# Patient Record
Sex: FEMALE | Race: WHITE | NOT HISPANIC OR LATINO | Employment: FULL TIME | RURAL
[De-identification: names, ages, dates, MRNs, and addresses within clinical notes are randomized per-mention and may not be internally consistent; named-entity substitution may affect disease eponyms.]

---

## 2019-07-09 RX ORDER — MONTELUKAST SODIUM 10 MG/1
10 TABLET ORAL
Qty: 90 TABLET | Refills: 0 | Status: SHIPPED | OUTPATIENT
Start: 2019-07-09 | End: 2019-09-17 | Stop reason: SDUPTHER

## 2019-07-22 PROBLEM — J45.20 MILD INTERMITTENT ASTHMA WITHOUT COMPLICATION: Status: ACTIVE | Noted: 2019-07-22

## 2019-07-22 PROBLEM — A60.00 GENITAL HERPES SIMPLEX: Status: ACTIVE | Noted: 2019-07-22

## 2019-07-22 PROBLEM — I10 BENIGN HYPERTENSION: Status: ACTIVE | Noted: 2019-07-22

## 2019-07-22 PROBLEM — E80.4 GILBERT'S SYNDROME: Status: ACTIVE | Noted: 2019-07-22

## 2019-07-22 PROBLEM — I35.0 AORTIC VALVE STENOSIS: Status: ACTIVE | Noted: 2019-07-22

## 2019-07-22 PROBLEM — F43.0 ACUTE REACTION TO STRESS: Status: ACTIVE | Noted: 2019-07-22

## 2019-07-22 PROBLEM — N95.1 MENOPAUSAL SYNDROME: Status: ACTIVE | Noted: 2019-07-22

## 2019-07-22 PROBLEM — E03.9 HYPOTHYROIDISM: Status: ACTIVE | Noted: 2019-07-22

## 2019-07-22 PROBLEM — K22.719 BARRETT'S ESOPHAGUS WITH DYSPLASIA: Status: ACTIVE | Noted: 2019-07-22

## 2019-07-22 PROBLEM — J30.9 ALLERGIC RHINITIS: Status: ACTIVE | Noted: 2019-07-22

## 2019-07-22 PROBLEM — E78.2 MIXED HYPERLIPIDEMIA: Status: ACTIVE | Noted: 2019-07-22

## 2019-07-22 RX ORDER — MELATONIN 10 MG
1 TABLET, SUBLINGUAL SUBLINGUAL 3 TIMES WEEKLY
COMMUNITY

## 2019-07-22 RX ORDER — BUDESONIDE AND FORMOTEROL FUMARATE DIHYDRATE 160; 4.5 UG/1; UG/1
AEROSOL RESPIRATORY (INHALATION)
COMMUNITY
Start: 2014-05-20 | End: 2019-11-11

## 2019-07-22 RX ORDER — LEVOTHYROXINE SODIUM 0.1 MG/1
100 TABLET ORAL DAILY
COMMUNITY
End: 2019-09-19 | Stop reason: SDUPTHER

## 2019-07-23 ENCOUNTER — OFFICE VISIT (OUTPATIENT)
Dept: FAMILY MEDICINE CLINIC | Facility: CLINIC | Age: 59
End: 2019-07-23

## 2019-07-23 VITALS
HEIGHT: 65 IN | DIASTOLIC BLOOD PRESSURE: 72 MMHG | BODY MASS INDEX: 27.76 KG/M2 | OXYGEN SATURATION: 99 % | RESPIRATION RATE: 18 BRPM | SYSTOLIC BLOOD PRESSURE: 132 MMHG | WEIGHT: 166.6 LBS | TEMPERATURE: 98 F | HEART RATE: 59 BPM

## 2019-07-23 DIAGNOSIS — E80.4 GILBERT'S SYNDROME: ICD-10-CM

## 2019-07-23 DIAGNOSIS — J45.20 MILD INTERMITTENT ASTHMA WITHOUT COMPLICATION: ICD-10-CM

## 2019-07-23 DIAGNOSIS — N95.1 MENOPAUSAL SYNDROME: ICD-10-CM

## 2019-07-23 DIAGNOSIS — E03.9 HYPOTHYROIDISM, UNSPECIFIED TYPE: ICD-10-CM

## 2019-07-23 DIAGNOSIS — F43.0 ACUTE REACTION TO STRESS: ICD-10-CM

## 2019-07-23 DIAGNOSIS — I10 BENIGN HYPERTENSION: ICD-10-CM

## 2019-07-23 DIAGNOSIS — A60.00 GENITAL HERPES SIMPLEX, UNSPECIFIED SITE: ICD-10-CM

## 2019-07-23 DIAGNOSIS — J30.9 ALLERGIC RHINITIS, UNSPECIFIED SEASONALITY, UNSPECIFIED TRIGGER: ICD-10-CM

## 2019-07-23 DIAGNOSIS — I35.0 AORTIC VALVE STENOSIS, ETIOLOGY OF CARDIAC VALVE DISEASE UNSPECIFIED: Primary | ICD-10-CM

## 2019-07-23 DIAGNOSIS — K92.1 BLOODY STOOLS: ICD-10-CM

## 2019-07-23 DIAGNOSIS — E78.2 MIXED HYPERLIPIDEMIA: ICD-10-CM

## 2019-07-23 PROBLEM — K22.719 BARRETT'S ESOPHAGUS WITH DYSPLASIA: Status: RESOLVED | Noted: 2019-07-22 | Resolved: 2019-07-23

## 2019-07-23 PROCEDURE — 99213 OFFICE O/P EST LOW 20 MIN: CPT | Performed by: FAMILY MEDICINE

## 2019-07-23 RX ORDER — METOPROLOL SUCCINATE 25 MG/1
25 TABLET, EXTENDED RELEASE ORAL DAILY
COMMUNITY
End: 2019-08-14 | Stop reason: SDUPTHER

## 2019-07-23 NOTE — PROGRESS NOTES
Subjective   Irma Buchanan is a 58 y.o. female.     Unable to get in to [facility] until end of August.       Rectal Bleeding   The current episode started more than 1 month ago (worsened in the last two weeks). Associated symptoms include fatigue. Pertinent negatives include no abdominal pain, anorexia, chest pain, fever, joint swelling, nausea, neck pain, numbness, rash, swollen glands, vomiting or weakness. Associated symptoms comments: Dark and bright blood,  Thinks she has internal hemorrhoids, family hx of colon cancer.        The following portions of the patient's history were reviewed and updated as appropriate: allergies, current medications, past family history, past medical history, past social history, past surgical history and problem list.    Patient Active Problem List   Diagnosis   • Benign hypertension   • Allergic rhinitis   • Hypothyroidism   • Mild intermittent asthma without complication   • Menopausal syndrome   • Acute reaction to stress   • Aortic valve stenosis   • Mixed hyperlipidemia   • Gilbert's syndrome   • Genital herpes simplex       Current Outpatient Medications on File Prior to Visit   Medication Sig Dispense Refill   • budesonide-formoterol (SYMBICORT) 160-4.5 MCG/ACT inhaler SYMBICORT 160-4.5 MCG/ACT AERO     • Cholecalciferol (VITAMIN D3) 55167 units tablet Take 1 tablet by mouth 3 (Three) Times a Week.     • levothyroxine (SYNTHROID, LEVOTHROID) 100 MCG tablet Take 100 mcg by mouth Daily.     • metoprolol succinate XL (TOPROL-XL) 25 MG 24 hr tablet Take 25 mg by mouth Daily.     • montelukast (SINGULAIR) 10 MG tablet Take 1 tablet by mouth every night at bedtime. 90 tablet 0   • Nutritional Supplements (DHEA PO) Take  by mouth.       No current facility-administered medications on file prior to visit.        Allergies   Allergen Reactions   • Codeine Nausea Only   • Penicillin V Potassium Rash   • Sulfamethoxazole-Trimethoprim Rash   • Cefprozil Itching       Review of Systems    Constitutional: Positive for fatigue. Negative for activity change, appetite change and fever.   HENT: Negative for ear pain and voice change.    Eyes: Negative for visual disturbance.   Respiratory: Negative for shortness of breath and wheezing.    Cardiovascular: Negative for chest pain and leg swelling.   Gastrointestinal: Positive for blood in stool and hematochezia. Negative for abdominal pain, anorexia, constipation, diarrhea, nausea and vomiting.   Endocrine: Negative for polydipsia and polyuria.   Genitourinary: Negative for dysuria, frequency and hematuria.   Musculoskeletal: Negative for joint swelling, neck pain and neck stiffness.   Skin: Negative for rash and bruise.   Neurological: Negative for weakness, numbness and headache.   Psychiatric/Behavioral: Negative for suicidal ideas and depressed mood.       Objective   Physical Exam   Constitutional: She is oriented to person, place, and time. She appears well-developed and well-nourished.   HENT:   Head: Normocephalic and atraumatic.   Left Ear: External ear normal.   Nose: Nose normal.   Mouth/Throat: Oropharynx is clear and moist.   Eyes: EOM are normal. Pupils are equal, round, and reactive to light.   Neck: Normal range of motion. Neck supple.   Cardiovascular: Normal rate, regular rhythm and normal heart sounds.   Pulmonary/Chest: Effort normal.   Abdominal: Soft. Bowel sounds are normal.   Musculoskeletal: Normal range of motion.   Neurological: She is alert and oriented to person, place, and time.   Skin: Skin is warm and dry.   Psychiatric: She has a normal mood and affect. Her behavior is normal. Judgment and thought content normal.         Assessment/Plan .  Problem List Items Addressed This Visit     Benign hypertension    Relevant Medications    metoprolol succinate XL (TOPROL-XL) 25 MG 24 hr tablet    Allergic rhinitis    Hypothyroidism    Relevant Medications    levothyroxine (SYNTHROID, LEVOTHROID) 100 MCG tablet    metoprolol succinate  XL (TOPROL-XL) 25 MG 24 hr tablet    Mild intermittent asthma without complication    Relevant Medications    budesonide-formoterol (SYMBICORT) 160-4.5 MCG/ACT inhaler    Menopausal syndrome    Relevant Medications    Cholecalciferol (VITAMIN D3) 68284 units tablet    Nutritional Supplements (DHEA PO)    Acute reaction to stress    Aortic valve stenosis - Primary    Relevant Medications    metoprolol succinate XL (TOPROL-XL) 25 MG 24 hr tablet    Mixed hyperlipidemia    Gilbert's syndrome    Genital herpes simplex      Other Visit Diagnoses     Bloody stools        Question melena. Recc scope.     Relevant Orders    CBC Auto Differential    Ambulatory Referral to General Surgery (Completed)        CBC at [facility]. Appt tomorrow with Dr Baires.

## 2019-07-28 ENCOUNTER — RESULTS ENCOUNTER (OUTPATIENT)
Dept: FAMILY MEDICINE CLINIC | Facility: CLINIC | Age: 59
End: 2019-07-28

## 2019-07-28 DIAGNOSIS — K92.1 BLOODY STOOLS: ICD-10-CM

## 2019-08-14 DIAGNOSIS — I10 BENIGN HYPERTENSION: Primary | ICD-10-CM

## 2019-08-15 RX ORDER — METOPROLOL SUCCINATE 25 MG/1
TABLET, EXTENDED RELEASE ORAL
Qty: 90 TABLET | Refills: 0 | Status: SHIPPED | OUTPATIENT
Start: 2019-08-15 | End: 2019-09-17 | Stop reason: SDUPTHER

## 2019-09-17 DIAGNOSIS — E03.9 HYPOTHYROIDISM, UNSPECIFIED TYPE: ICD-10-CM

## 2019-09-17 DIAGNOSIS — I10 BENIGN HYPERTENSION: ICD-10-CM

## 2019-09-17 RX ORDER — MONTELUKAST SODIUM 10 MG/1
TABLET ORAL
Qty: 90 TABLET | Refills: 3 | Status: SHIPPED | OUTPATIENT
Start: 2019-09-17 | End: 2020-10-15

## 2019-09-17 RX ORDER — METOPROLOL SUCCINATE 25 MG/1
TABLET, EXTENDED RELEASE ORAL
Qty: 30 TABLET | Refills: 3 | Status: SHIPPED | OUTPATIENT
Start: 2019-09-17 | End: 2019-12-30 | Stop reason: SDUPTHER

## 2019-09-19 DIAGNOSIS — E03.9 HYPOTHYROIDISM, UNSPECIFIED TYPE: ICD-10-CM

## 2019-09-19 RX ORDER — LEVOTHYROXINE SODIUM 0.1 MG/1
TABLET ORAL
Qty: 90 TABLET | Refills: 1 | OUTPATIENT
Start: 2019-09-19

## 2019-09-19 RX ORDER — LEVOTHYROXINE SODIUM 0.1 MG/1
100 TABLET ORAL DAILY
Qty: 90 TABLET | Refills: 3 | Status: SHIPPED | OUTPATIENT
Start: 2019-09-19 | End: 2020-10-15

## 2019-11-11 ENCOUNTER — OFFICE VISIT (OUTPATIENT)
Dept: FAMILY MEDICINE CLINIC | Facility: CLINIC | Age: 59
End: 2019-11-11

## 2019-11-11 VITALS
DIASTOLIC BLOOD PRESSURE: 92 MMHG | BODY MASS INDEX: 29.22 KG/M2 | WEIGHT: 175.4 LBS | OXYGEN SATURATION: 98 % | TEMPERATURE: 98.1 F | HEIGHT: 65 IN | HEART RATE: 71 BPM | RESPIRATION RATE: 18 BRPM | SYSTOLIC BLOOD PRESSURE: 153 MMHG

## 2019-11-11 DIAGNOSIS — J01.00 ACUTE NON-RECURRENT MAXILLARY SINUSITIS: Primary | ICD-10-CM

## 2019-11-11 PROCEDURE — 99213 OFFICE O/P EST LOW 20 MIN: CPT | Performed by: FAMILY MEDICINE

## 2019-11-11 RX ORDER — SULFAMETHOXAZOLE AND TRIMETHOPRIM 800; 160 MG/1; MG/1
1 TABLET ORAL 2 TIMES DAILY
Qty: 20 TABLET | Refills: 0 | Status: SHIPPED | OUTPATIENT
Start: 2019-11-11 | End: 2019-11-21

## 2019-11-11 NOTE — ASSESSMENT & PLAN NOTE
He was prescribed Bactrim DS to treat his symptoms.    Increase fluids. Tylenol/motrin for pain or fever.   Medication and medication adverse effects discussed.    Follow-up 5-7 days for reevaluation if not improved or sooner if needed.

## 2019-11-11 NOTE — PROGRESS NOTES
Chief Complaint   Patient presents with   • URI       History of Present Illness:  Subjective   Irma Buchanan is a 58 y.o. female.   URI    This is a new problem. The current episode started 1 to 4 weeks ago (last wednesday ). The problem has been gradually worsening. The maximum temperature recorded prior to her arrival was 101 - 101.9 F. The fever has been present for less than 1 day. Associated symptoms include congestion, coughing, ear pain, headaches, a plugged ear sensation, sinus pain, sneezing and a sore throat. Pertinent negatives include no abdominal pain, chest pain, diarrhea, dysuria, joint pain, joint swelling, nausea, neck pain, rash, rhinorrhea, swollen glands, vomiting or wheezing. Associated symptoms comments: She states that this all started last week around Wednesday and she states that she feels as if it is not getting better. She states that she has had a fever but did not measure it. She states that her throat is bothering her and she states that she originally thought it was a sinus but it has gotten worse.  . She has tried nothing (She has had extra allergy and she has had some prednisone that her daughter had and she states that it helped some) for the symptoms. The treatment provided no relief.        Allergies:  Allergies   Allergen Reactions   • Codeine Nausea Only and Hallucinations   • Penicillin V Potassium Rash   • Sulfamethoxazole-Trimethoprim Rash   • Cefprozil Itching   • Sulfa Antibiotics Hallucinations   • Penicillins Rash       Social History:  Social History     Socioeconomic History   • Marital status:      Spouse name: Not on file   • Number of children: Not on file   • Years of education: Not on file   • Highest education level: Not on file   Tobacco Use   • Smoking status: Never Smoker   • Smokeless tobacco: Never Used   Substance and Sexual Activity   • Alcohol use: No     Frequency: Never   • Drug use: No       Family History:  Family History   Problem Relation  Age of Onset   • Heart disease Mother    • Stomach cancer Father        Past Medical History :  Active Ambulatory Problems     Diagnosis Date Noted   • Benign hypertension 07/22/2019   • Allergic rhinitis 07/22/2019   • Hypothyroidism 07/22/2019   • Mild intermittent asthma without complication 07/22/2019   • Menopausal syndrome 07/22/2019   • Acute reaction to stress 07/22/2019   • Aortic valve stenosis 07/22/2019   • Mixed hyperlipidemia 07/22/2019   • Gilbert's syndrome 07/22/2019   • Genital herpes simplex 07/22/2019   • Acute non-recurrent maxillary sinusitis 11/11/2019     Resolved Ambulatory Problems     Diagnosis Date Noted   • Ram's esophagus with dysplasia 07/22/2019     Past Medical History:   Diagnosis Date   • Allergic rhinitis    • Aortic stenosis    • Asthma    • Ram's esophagus    • Benign hypertension    • Gilbert's syndrome    • Hypothyroidism    • Menopausal syndrome    • Mixed hyperlipidemia        Medication List:    Current Outpatient Medications:   •  Cholecalciferol (VITAMIN D3) 12393 units tablet, Take 1 tablet by mouth 3 (Three) Times a Week., Disp: , Rfl:   •  Estradiol-Estriol-Progesterone (BIEST/PROGESTERONE TD), DISSOLVE ONE JONATHAN BETWEEN CHEEK AND TEETH TWICE DAILY. ALLOW TO DISSOLVE SLOWLY, Disp: , Rfl: 4  •  levothyroxine (SYNTHROID, LEVOTHROID) 100 MCG tablet, Take 1 tablet by mouth Daily., Disp: 90 tablet, Rfl: 3  •  metoprolol succinate XL (TOPROL-XL) 25 MG 24 hr tablet, TAKE ONE TABLET BY MOUTH ONCE DAILY, Disp: 30 tablet, Rfl: 3  •  montelukast (SINGULAIR) 10 MG tablet, TAKE ONE TABLET BY MOUTH AT BEDTIME, Disp: 90 tablet, Rfl: 3  •  Nutritional Supplements (DHEA PO), Take  by mouth., Disp: , Rfl:   •  Progesterone 40 % cream, 75mg- 0.25mg cream, Disp: , Rfl: 5  •  sulfamethoxazole-trimethoprim (BACTRIM DS) 800-160 MG per tablet, Take 1 tablet by mouth 2 (Two) Times a Day for 10 days., Disp: 20 tablet, Rfl: 0    Past Surgical History:  No past surgical history on file.  "    The following portions of the patient's history were reviewed and updated as appropriate: allergies, current medications, past family history, past medical history, past social history, past surgical history and problem list.    Review Of Systems:  Review of Systems   HENT: Positive for congestion, ear pain, sneezing and sore throat. Negative for rhinorrhea and swollen glands.    Respiratory: Positive for cough. Negative for wheezing.    Cardiovascular: Negative for chest pain.   Gastrointestinal: Negative for abdominal pain, diarrhea, nausea and vomiting.   Genitourinary: Negative for dysuria.   Musculoskeletal: Negative for joint pain and neck pain.   Skin: Negative for rash.       Physical Exam:  Vital Signs:  Vitals:    11/11/19 1610   BP: 153/92   Pulse: 71   Resp: 18   Temp: 98.1 °F (36.7 °C)   SpO2: 98%   Weight: 79.6 kg (175 lb 6.4 oz)   Height: 165.1 cm (65\")     Body mass index is 29.19 kg/m².    Physical Exam   Constitutional: She is oriented to person, place, and time. She appears well-developed and well-nourished. She is active.   HENT:   Head: Normocephalic and atraumatic.   Nose: Right sinus exhibits maxillary sinus tenderness. Left sinus exhibits maxillary sinus tenderness.   Mouth/Throat: Posterior oropharyngeal erythema present.   Eyes: Conjunctivae and lids are normal. Pupils are equal, round, and reactive to light.   Neck: Normal range of motion. Neck supple.   Cardiovascular: Normal rate, regular rhythm, normal heart sounds and normal pulses.   Pulmonary/Chest: Effort normal and breath sounds normal. No respiratory distress.   Abdominal: Soft. Bowel sounds are normal.   Musculoskeletal: Normal range of motion.   Neurological: She is alert and oriented to person, place, and time.   Skin: Skin is warm and dry. Capillary refill takes less than 2 seconds.   Psychiatric: She has a normal mood and affect. Her behavior is normal. Judgment and thought content normal.   Vitals " reviewed.        Assessment and Plan:  Diagnoses and all orders for this visit:    1. Acute non-recurrent maxillary sinusitis (Primary)  Assessment & Plan:  He was prescribed Bactrim DS to treat his symptoms.    Increase fluids. Tylenol/motrin for pain or fever.   Medication and medication adverse effects discussed.    Follow-up 5-7 days for reevaluation if not improved or sooner if needed.        Other orders  -     sulfamethoxazole-trimethoprim (BACTRIM DS) 800-160 MG per tablet; Take 1 tablet by mouth 2 (Two) Times a Day for 10 days.  Dispense: 20 tablet; Refill: 0

## 2019-11-14 ENCOUNTER — OFFICE VISIT (OUTPATIENT)
Dept: FAMILY MEDICINE CLINIC | Facility: CLINIC | Age: 59
End: 2019-11-14

## 2019-11-14 VITALS
WEIGHT: 170.2 LBS | OXYGEN SATURATION: 98 % | RESPIRATION RATE: 16 BRPM | BODY MASS INDEX: 28.36 KG/M2 | SYSTOLIC BLOOD PRESSURE: 136 MMHG | DIASTOLIC BLOOD PRESSURE: 81 MMHG | TEMPERATURE: 98 F | HEART RATE: 68 BPM | HEIGHT: 65 IN

## 2019-11-14 DIAGNOSIS — M27.8: ICD-10-CM

## 2019-11-14 DIAGNOSIS — J32.9 SINUSITIS, UNSPECIFIED CHRONICITY, UNSPECIFIED LOCATION: Primary | ICD-10-CM

## 2019-11-14 PROCEDURE — 99214 OFFICE O/P EST MOD 30 MIN: CPT | Performed by: PREVENTIVE MEDICINE

## 2019-11-14 RX ORDER — CLINDAMYCIN HYDROCHLORIDE 300 MG/1
CAPSULE ORAL
Qty: 20 CAPSULE | Refills: 0 | Status: SHIPPED | OUTPATIENT
Start: 2019-11-14 | End: 2020-02-11

## 2019-11-14 NOTE — PATIENT INSTRUCTIONS
tAKE BOTH ANTIOBIOTICS TILL GONE REST AND FLUIDS.  LOFF WORK TODAY AND TOMORROW.  eR IF FEVER OR INCREASE IN HEADACHE SEVERE

## 2019-11-14 NOTE — PROGRESS NOTES
"Luz Buchanan is a 58 y.o. female presents for   Chief Complaint   Patient presents with   • Sinusitis   ru ROOT CANAL THAT TOOK 5 HOURS CONCERN WITH SINUS PERFORATION. wAS ON cLEOCIN FOR ROOT CANAL  sTOPPED AND STARTED WITH URI THAT EVENING.   New fever over weekend and no sinus drainage.  Still tooth pain.  No cough or diarrhea    Health Maintenance Due   Topic Date Due   • ANNUAL PHYSICAL  11/19/1961   • TDAP/TD VACCINES (1 - Tdap) 11/19/1977   • ZOSTER VACCINE (1 of 2) 11/19/2008   • MAMMOGRAM  03/17/2012   • HEPATITIS C SCREENING  07/22/2019   • PAP SMEAR  07/22/2019   • LIPID PANEL  07/22/2019       History of Present Illness     Vitals:    11/14/19 0941 11/14/19 0947   BP: 135/90 136/81   BP Location: Left arm Right arm   Patient Position: Sitting Sitting   Cuff Size: Adult Adult   Pulse: 68    Resp: 16    Temp: 98 °F (36.7 °C)    TempSrc: Oral    SpO2: 98%    Weight: 77.2 kg (170 lb 3.2 oz)    Height: 165.1 cm (65\")      Body mass index is 28.32 kg/m².    Current Outpatient Medications on File Prior to Visit   Medication Sig Dispense Refill   • Cholecalciferol (VITAMIN D3) 43849 units tablet Take 1 tablet by mouth 3 (Three) Times a Week.     • Estradiol-Estriol-Progesterone (BIEST/PROGESTERONE TD) DISSOLVE ONE JONATHAN BETWEEN CHEEK AND TEETH TWICE DAILY. ALLOW TO DISSOLVE SLOWLY  4   • levothyroxine (SYNTHROID, LEVOTHROID) 100 MCG tablet Take 1 tablet by mouth Daily. 90 tablet 3   • metoprolol succinate XL (TOPROL-XL) 25 MG 24 hr tablet TAKE ONE TABLET BY MOUTH ONCE DAILY 30 tablet 3   • montelukast (SINGULAIR) 10 MG tablet TAKE ONE TABLET BY MOUTH AT BEDTIME 90 tablet 3   • Nutritional Supplements (DHEA PO) Take  by mouth.     • Progesterone 40 % cream 75mg- 0.25mg cream  5   • sulfamethoxazole-trimethoprim (BACTRIM DS) 800-160 MG per tablet Take 1 tablet by mouth 2 (Two) Times a Day for 10 days. 20 tablet 0     No current facility-administered medications on file prior to visit.        The " following portions of the patient's history were reviewed and updated as appropriate: allergies, current medications, past family history, past medical history, past social history, past surgical history and problem list.    Review of Systems   Constitutional: Negative.    HENT: Positive for congestion, dental problem, sore throat and swollen glands. Negative for sinus pressure.    Eyes: Negative.    Respiratory: Negative.  Negative for cough.    Cardiovascular: Negative.    Gastrointestinal: Negative.    Endocrine: Negative.    Genitourinary: Negative.    Musculoskeletal: Negative.    Skin: Negative.    Allergic/Immunologic: Positive for environmental allergies.   Neurological: Negative.    Hematological: Negative.    Psychiatric/Behavioral: Negative.        Objective   Physical Exam   Constitutional: She is oriented to person, place, and time. She appears well-developed.   HENT:   Head: Normocephalic and atraumatic.   Cervica;l adnopathy red nasal mucosa and R fascial pain.   Eyes: Conjunctivae and EOM are normal. Pupils are equal, round, and reactive to light.   Neck: Normal range of motion.   Cardiovascular: Normal rate and regular rhythm.   Pulmonary/Chest: Effort normal and breath sounds normal.   Abdominal: Soft. Bowel sounds are normal.   Musculoskeletal: Normal range of motion.   Lymphadenopathy:     She has no cervical adenopathy.   Neurological: She is alert and oriented to person, place, and time.   Skin: Skin is warm and dry.   Psychiatric: She has a normal mood and affect.   Nursing note and vitals reviewed.    PHQ-9 Total Score:      Assessment/Plan   Irma was seen today for sinusitis.    Diagnoses and all orders for this visit:    Sinusitis, unspecified chronicity, unspecified location    Root canal space perforation        Patient Instructions   tAKE BOTH ANTIOBIOTICS TILL GONE REST AND FLUIDS.  LOFF WORK TODAY AND TOMORROW.  eR IF FEVER OR INCREASE IN HEADACHE SEVERE

## 2019-12-29 DIAGNOSIS — I10 BENIGN HYPERTENSION: ICD-10-CM

## 2019-12-30 RX ORDER — METOPROLOL SUCCINATE 25 MG/1
TABLET, EXTENDED RELEASE ORAL
Qty: 30 TABLET | Refills: 3 | Status: SHIPPED | OUTPATIENT
Start: 2019-12-30 | End: 2020-03-16

## 2020-02-11 ENCOUNTER — HOSPITAL ENCOUNTER (OUTPATIENT)
Dept: GENERAL RADIOLOGY | Facility: HOSPITAL | Age: 60
Discharge: HOME | End: 2020-02-11
Admitting: FAMILY MEDICINE

## 2020-02-11 ENCOUNTER — TELEPHONE (OUTPATIENT)
Dept: FAMILY MEDICINE CLINIC | Facility: CLINIC | Age: 60
End: 2020-02-11

## 2020-02-11 ENCOUNTER — OFFICE VISIT (OUTPATIENT)
Dept: FAMILY MEDICINE CLINIC | Facility: CLINIC | Age: 60
End: 2020-02-11

## 2020-02-11 VITALS
HEIGHT: 65 IN | RESPIRATION RATE: 18 BRPM | BODY MASS INDEX: 30.82 KG/M2 | TEMPERATURE: 98 F | WEIGHT: 185 LBS | HEART RATE: 88 BPM | SYSTOLIC BLOOD PRESSURE: 132 MMHG | OXYGEN SATURATION: 99 % | DIASTOLIC BLOOD PRESSURE: 80 MMHG

## 2020-02-11 DIAGNOSIS — J32.0 CHRONIC MAXILLARY SINUSITIS: ICD-10-CM

## 2020-02-11 DIAGNOSIS — I35.0 AORTIC VALVE STENOSIS, ETIOLOGY OF CARDIAC VALVE DISEASE UNSPECIFIED: ICD-10-CM

## 2020-02-11 DIAGNOSIS — R06.09 DOE (DYSPNEA ON EXERTION): ICD-10-CM

## 2020-02-11 DIAGNOSIS — Z00.00 ANNUAL PHYSICAL EXAM: Primary | ICD-10-CM

## 2020-02-11 DIAGNOSIS — R53.83 FATIGUE, UNSPECIFIED TYPE: ICD-10-CM

## 2020-02-11 DIAGNOSIS — Z12.31 ENCOUNTER FOR SCREENING MAMMOGRAM FOR BREAST CANCER: ICD-10-CM

## 2020-02-11 PROCEDURE — 99213 OFFICE O/P EST LOW 20 MIN: CPT | Performed by: FAMILY MEDICINE

## 2020-02-11 PROCEDURE — 99396 PREV VISIT EST AGE 40-64: CPT | Performed by: FAMILY MEDICINE

## 2020-02-11 PROCEDURE — 71046 X-RAY EXAM CHEST 2 VIEWS: CPT

## 2020-02-11 NOTE — TELEPHONE ENCOUNTER
----- Message from Jesse Renteria MD sent at 2/11/2020  5:36 PM EST -----  Please notify patient that  cxr was normal

## 2020-02-11 NOTE — PROGRESS NOTES
Luz Buchanan is a 59 y.o. female.     The patient is here: for coordination of medical care and annual wellness examination.  Due for mammo. S/P hysterectomy. Colonoscopy 8/2019.  Recently treated for chronic sinusitis.      Shortness of Breath   Episode onset: 1 month. The problem occurs constantly. The problem has been gradually worsening. Pertinent negatives include no abdominal pain, chest pain, ear pain, fever, leg swelling, neck pain, rash, swollen glands, vomiting or wheezing. Associated symptoms comments: Tired all the time, swelling in ankles, fatigued, gained 16 lbs since July .        The following portions of the patient's history were reviewed and updated as appropriate: allergies, current medications, past family history, past medical history, past social history, past surgical history and problem list.    Patient Active Problem List   Diagnosis   • Benign hypertension   • Allergic rhinitis   • Hypothyroidism   • Mild intermittent asthma without complication   • Menopausal syndrome   • Acute reaction to stress   • Aortic valve stenosis   • Mixed hyperlipidemia   • Gilbert's syndrome   • Genital herpes simplex   • Acute non-recurrent maxillary sinusitis   • Astigmatism   • Chronic maxillary sinusitis       Current Outpatient Medications on File Prior to Visit   Medication Sig Dispense Refill   • Cholecalciferol (VITAMIN D3) 01807 units tablet Take 1 tablet by mouth 3 (Three) Times a Week.     • Estradiol-Estriol-Progesterone (BIEST/PROGESTERONE TD) DISSOLVE ONE JONATHAN BETWEEN CHEEK AND TEETH TWICE DAILY. ALLOW TO DISSOLVE SLOWLY  4   • levothyroxine (SYNTHROID, LEVOTHROID) 100 MCG tablet Take 1 tablet by mouth Daily. 90 tablet 3   • metoprolol succinate XL (TOPROL-XL) 25 MG 24 hr tablet TAKE ONE TABLET BY MOUTH ONCE DAILY 30 tablet 3   • montelukast (SINGULAIR) 10 MG tablet TAKE ONE TABLET BY MOUTH AT BEDTIME 90 tablet 3   • Progesterone 40 % cream 75mg- 0.25mg cream  5   • [DISCONTINUED]  clindamycin (CLEOCIN) 300 MG capsule oNE BY MOUTH TWICE DAILY 20 capsule 0   • [DISCONTINUED] Nutritional Supplements (DHEA PO) Take  by mouth.       No current facility-administered medications on file prior to visit.        Allergies   Allergen Reactions   • Codeine Nausea Only and Hallucinations   • Penicillin V Potassium Rash   • Sulfamethoxazole-Trimethoprim Rash   • Cefprozil Itching   • Sulfa Antibiotics Hallucinations   • Penicillins Rash       Review of Systems   Constitutional: Positive for fatigue. Negative for activity change, appetite change and fever.   HENT: Negative for ear pain, swollen glands and voice change.    Eyes: Negative for visual disturbance.   Respiratory: Positive for shortness of breath. Negative for wheezing.    Cardiovascular: Negative for chest pain and leg swelling.   Gastrointestinal: Negative for abdominal pain, blood in stool, constipation, diarrhea, nausea and vomiting.   Endocrine: Negative for polydipsia and polyuria.   Genitourinary: Negative for dysuria, frequency and hematuria.   Musculoskeletal: Negative for joint swelling, neck pain and neck stiffness.   Skin: Negative for rash and bruise.   Neurological: Negative for weakness, numbness and headache.   Psychiatric/Behavioral: Negative for suicidal ideas and depressed mood.     I have reviewed and confirmed the accuracy of the ROS as documented by the MA/LPN/RN Jesse Renteria MD      Objective   Vitals:    02/11/20 1550   BP: 132/80   Pulse: 88   Resp: 18   Temp: 98 °F (36.7 °C)   SpO2: 99%     Physical Exam   Constitutional: She is oriented to person, place, and time. She appears well-developed and well-nourished.   HENT:   Head: Normocephalic and atraumatic.   Left Ear: External ear normal.   Nose: Nose normal.   Mouth/Throat: Oropharynx is clear and moist.   Eyes: Pupils are equal, round, and reactive to light. EOM are normal.   Neck: Normal range of motion. Neck supple.   Cardiovascular: Normal rate, regular  rhythm and normal heart sounds.   Pulmonary/Chest: Effort normal.   Abdominal: Soft. Bowel sounds are normal.   Musculoskeletal: Normal range of motion.   Neurological: She is alert and oriented to person, place, and time.   Skin: Skin is warm and dry.   Psychiatric: She has a normal mood and affect. Her behavior is normal. Judgment and thought content normal.         Assessment/Plan .  Problem List Items Addressed This Visit     Aortic valve stenosis - Primary    Current Assessment & Plan     Recheck echo         Chronic maxillary sinusitis    Overview     Followed by ENT               Other Visit Diagnoses     SOLO (dyspnea on exertion)        Relevant Orders    Adult Transthoracic Echo Complete W/ Cont if Necessary Per Protocol    XR Chest PA & Lateral (Completed)    BNP    Fatigue, unspecified type        Relevant Orders    C-reactive protein    Comprehensive Metabolic Panel    CBC Auto Differential    Annual physical exam        Relevant Orders    Mammo Screening Digital Tomosynthesis Bilateral With CAD    Lipid Panel With / Chol / HDL Ratio    CBC Auto Differential    TSH    Encounter for screening mammogram for breast cancer        Relevant Orders    Mammo Screening Digital Tomosynthesis Bilateral With CAD      Discussed anticipatory guidance, diet, exercise, and weight loss.  Discussed safety and routine screening examinations.  Discussed self-examinations.  Findings discussed. All questions answered.  Differential diagnosis discussed.   Follow-up after testing complete, sooner for worsening symptoms or any concerns

## 2020-02-14 ENCOUNTER — TELEPHONE (OUTPATIENT)
Dept: FAMILY MEDICINE CLINIC | Facility: CLINIC | Age: 60
End: 2020-02-14

## 2020-02-14 LAB
ALBUMIN SERPL-MCNC: 4.6 G/DL (ref 3.8–4.8)
ALBUMIN/GLOB SERPL: 2 {RATIO} (ref 1.2–2.2)
ALP SERPL-CCNC: 69 IU/L (ref 39–117)
ALT SERPL-CCNC: 16 IU/L (ref 0–32)
AST SERPL-CCNC: 15 IU/L (ref 0–40)
BASOPHILS # BLD AUTO: 0.1 X10E3/UL (ref 0–0.2)
BASOPHILS NFR BLD AUTO: 1 %
BILIRUB SERPL-MCNC: 3.2 MG/DL (ref 0–1.2)
BNP SERPL-MCNC: 42.7 PG/ML (ref 0–100)
BUN SERPL-MCNC: 10 MG/DL (ref 8–27)
BUN/CREAT SERPL: 9 (ref 12–28)
CALCIUM SERPL-MCNC: 9.8 MG/DL (ref 8.7–10.3)
CHLORIDE SERPL-SCNC: 99 MMOL/L (ref 96–106)
CHOLEST SERPL-MCNC: 234 MG/DL (ref 100–199)
CHOLEST/HDLC SERPL: 3.3 RATIO (ref 0–4.4)
CO2 SERPL-SCNC: 27 MMOL/L (ref 20–29)
CREAT SERPL-MCNC: 1.07 MG/DL (ref 0.57–1)
CRP SERPL-MCNC: <1 MG/L (ref 0–10)
EOSINOPHIL # BLD AUTO: 0.2 X10E3/UL (ref 0–0.4)
EOSINOPHIL NFR BLD AUTO: 4 %
ERYTHROCYTE [DISTWIDTH] IN BLOOD BY AUTOMATED COUNT: 12.7 % (ref 11.7–15.4)
GLOBULIN SER CALC-MCNC: 2.3 G/DL (ref 1.5–4.5)
GLUCOSE SERPL-MCNC: 89 MG/DL (ref 65–99)
HCT VFR BLD AUTO: 43 % (ref 34–46.6)
HDLC SERPL-MCNC: 71 MG/DL
HGB BLD-MCNC: 14.4 G/DL (ref 11.1–15.9)
IMM GRANULOCYTES # BLD AUTO: 0 X10E3/UL (ref 0–0.1)
IMM GRANULOCYTES NFR BLD AUTO: 0 %
LDLC SERPL CALC-MCNC: 147 MG/DL (ref 0–99)
LYMPHOCYTES # BLD AUTO: 2.2 X10E3/UL (ref 0.7–3.1)
LYMPHOCYTES NFR BLD AUTO: 35 %
MCH RBC QN AUTO: 30.1 PG (ref 26.6–33)
MCHC RBC AUTO-ENTMCNC: 33.5 G/DL (ref 31.5–35.7)
MCV RBC AUTO: 90 FL (ref 79–97)
MONOCYTES # BLD AUTO: 0.5 X10E3/UL (ref 0.1–0.9)
MONOCYTES NFR BLD AUTO: 9 %
NEUTROPHILS # BLD AUTO: 3.2 X10E3/UL (ref 1.4–7)
NEUTROPHILS NFR BLD AUTO: 51 %
PLATELET # BLD AUTO: 247 X10E3/UL (ref 150–450)
POTASSIUM SERPL-SCNC: 4.1 MMOL/L (ref 3.5–5.2)
PROT SERPL-MCNC: 6.9 G/DL (ref 6–8.5)
RBC # BLD AUTO: 4.79 X10E6/UL (ref 3.77–5.28)
SODIUM SERPL-SCNC: 141 MMOL/L (ref 134–144)
TRIGL SERPL-MCNC: 82 MG/DL (ref 0–149)
TSH SERPL DL<=0.005 MIU/L-ACNC: 3.27 UIU/ML (ref 0.45–4.5)
VLDLC SERPL CALC-MCNC: 16 MG/DL (ref 5–40)
WBC # BLD AUTO: 6.2 X10E3/UL (ref 3.4–10.8)

## 2020-02-14 NOTE — TELEPHONE ENCOUNTER
----- Message from Jesse Renteria MD sent at 2/14/2020  8:46 AM EST -----  Please notify patient that  Chol is slightly elevated. Also kidney function high normal. Recheck routinely

## 2020-02-17 ENCOUNTER — HOSPITAL ENCOUNTER (OUTPATIENT)
Dept: MAMMOGRAPHY | Facility: HOSPITAL | Age: 60
Discharge: HOME | End: 2020-02-17
Admitting: FAMILY MEDICINE

## 2020-02-17 ENCOUNTER — HOSPITAL ENCOUNTER (OUTPATIENT)
Dept: CARDIOLOGY | Facility: HOSPITAL | Age: 60
Discharge: HOME | End: 2020-02-17

## 2020-02-17 VITALS — BODY MASS INDEX: 30.82 KG/M2 | HEIGHT: 65 IN | WEIGHT: 184.97 LBS

## 2020-02-17 PROCEDURE — 77067 SCR MAMMO BI INCL CAD: CPT

## 2020-02-17 PROCEDURE — 77063 BREAST TOMOSYNTHESIS BI: CPT

## 2020-02-17 PROCEDURE — 93306 TTE W/DOPPLER COMPLETE: CPT

## 2020-02-24 DIAGNOSIS — R07.9 CHEST PAIN AT REST: Primary | ICD-10-CM

## 2020-02-27 ENCOUNTER — TELEPHONE (OUTPATIENT)
Dept: FAMILY MEDICINE CLINIC | Facility: CLINIC | Age: 60
End: 2020-02-27

## 2020-02-27 LAB
BH CV ECHO MEAS - ACS: 1.9 CM
BH CV ECHO MEAS - AI DEC SLOPE: 312.8 CM/SEC^2
BH CV ECHO MEAS - AI DEC TIME: 1.7 SEC
BH CV ECHO MEAS - AI MAX PG: 109.8 MMHG
BH CV ECHO MEAS - AI MAX VEL: 523.8 CM/SEC
BH CV ECHO MEAS - AI P1/2T: 490.4 MSEC
BH CV ECHO MEAS - AO MAX PG (FULL): 3.1 MMHG
BH CV ECHO MEAS - AO MAX PG: 11.2 MMHG
BH CV ECHO MEAS - AO MEAN PG (FULL): 1.6 MMHG
BH CV ECHO MEAS - AO MEAN PG: 5.8 MMHG
BH CV ECHO MEAS - AO ROOT AREA (BSA CORRECTED): 1.4
BH CV ECHO MEAS - AO ROOT AREA: 6 CM^2
BH CV ECHO MEAS - AO ROOT DIAM: 2.8 CM
BH CV ECHO MEAS - AO V2 MAX: 167.6 CM/SEC
BH CV ECHO MEAS - AO V2 MEAN: 111.9 CM/SEC
BH CV ECHO MEAS - AO V2 VTI: 38.3 CM
BH CV ECHO MEAS - AVA(I,A): 2.9 CM^2
BH CV ECHO MEAS - AVA(I,D): 2.9 CM^2
BH CV ECHO MEAS - AVA(V,A): 2.8 CM^2
BH CV ECHO MEAS - AVA(V,D): 2.8 CM^2
BH CV ECHO MEAS - BSA(HAYCOCK): 2 M^2
BH CV ECHO MEAS - BSA: 1.9 M^2
BH CV ECHO MEAS - BZI_BMI: 30.8 KILOGRAMS/M^2
BH CV ECHO MEAS - BZI_METRIC_HEIGHT: 165.1 CM
BH CV ECHO MEAS - BZI_METRIC_WEIGHT: 83.9 KG
BH CV ECHO MEAS - EDV(CUBED): 96.9 ML
BH CV ECHO MEAS - EDV(MOD-SP4): 58.2 ML
BH CV ECHO MEAS - EDV(TEICH): 97 ML
BH CV ECHO MEAS - EF(CUBED): 74 %
BH CV ECHO MEAS - EF(MOD-BP): 60 %
BH CV ECHO MEAS - EF(MOD-SP4): 60 %
BH CV ECHO MEAS - EF(TEICH): 65.8 %
BH CV ECHO MEAS - ESV(CUBED): 25.2 ML
BH CV ECHO MEAS - ESV(MOD-SP4): 23.3 ML
BH CV ECHO MEAS - ESV(TEICH): 33.1 ML
BH CV ECHO MEAS - FS: 36.1 %
BH CV ECHO MEAS - IVS/LVPW: 1.1
BH CV ECHO MEAS - IVSD: 1.1 CM
BH CV ECHO MEAS - LA DIMENSION: 4 CM
BH CV ECHO MEAS - LA/AO: 1.5
BH CV ECHO MEAS - LV DIASTOLIC VOL/BSA (35-75): 30.4 ML/M^2
BH CV ECHO MEAS - LV MASS(C)D: 165 GRAMS
BH CV ECHO MEAS - LV MASS(C)DI: 86.2 GRAMS/M^2
BH CV ECHO MEAS - LV MAX PG: 8.2 MMHG
BH CV ECHO MEAS - LV MEAN PG: 4.2 MMHG
BH CV ECHO MEAS - LV SYSTOLIC VOL/BSA (12-30): 12.2 ML/M^2
BH CV ECHO MEAS - LV V1 MAX: 142.7 CM/SEC
BH CV ECHO MEAS - LV V1 MEAN: 96.5 CM/SEC
BH CV ECHO MEAS - LV V1 VTI: 34.2 CM
BH CV ECHO MEAS - LVIDD: 4.6 CM
BH CV ECHO MEAS - LVIDS: 2.9 CM
BH CV ECHO MEAS - LVOT AREA: 3.2 CM^2
BH CV ECHO MEAS - LVOT DIAM: 2 CM
BH CV ECHO MEAS - LVPWD: 0.99 CM
BH CV ECHO MEAS - MR MAX PG: 148.5 MMHG
BH CV ECHO MEAS - MR MAX VEL: 609.4 CM/SEC
BH CV ECHO MEAS - MV A MAX VEL: 87.6 CM/SEC
BH CV ECHO MEAS - MV E MAX VEL: 89.4 CM/SEC
BH CV ECHO MEAS - MV E/A: 1
BH CV ECHO MEAS - MV MAX PG: 4.2 MMHG
BH CV ECHO MEAS - MV MEAN PG: 1.4 MMHG
BH CV ECHO MEAS - MV V2 MAX: 102.2 CM/SEC
BH CV ECHO MEAS - MV V2 MEAN: 54.2 CM/SEC
BH CV ECHO MEAS - MV V2 VTI: 32.9 CM
BH CV ECHO MEAS - MVA(VTI): 3.4 CM^2
BH CV ECHO MEAS - PA ACC TIME: 0.14 SEC
BH CV ECHO MEAS - PA MAX PG: 3.4 MMHG
BH CV ECHO MEAS - PA PR(ACCEL): 16.1 MMHG
BH CV ECHO MEAS - PA V2 MAX: 92.6 CM/SEC
BH CV ECHO MEAS - PI END-D VEL: 94.9 CM/SEC
BH CV ECHO MEAS - RAP SYSTOLE: 10 MMHG
BH CV ECHO MEAS - RVDD(2D): 1.7 CM
BH CV ECHO MEAS - RVDD: 1.7 CM
BH CV ECHO MEAS - RVSP: 46.4 MMHG
BH CV ECHO MEAS - SI(AO): 119.5 ML/M^2
BH CV ECHO MEAS - SI(CUBED): 37.4 ML/M^2
BH CV ECHO MEAS - SI(LVOT): 58 ML/M^2
BH CV ECHO MEAS - SI(MOD-SP4): 18.2 ML/M^2
BH CV ECHO MEAS - SI(TEICH): 33.4 ML/M^2
BH CV ECHO MEAS - SV(AO): 228.7 ML
BH CV ECHO MEAS - SV(CUBED): 71.6 ML
BH CV ECHO MEAS - SV(LVOT): 111 ML
BH CV ECHO MEAS - SV(MOD-SP4): 34.9 ML
BH CV ECHO MEAS - SV(TEICH): 63.8 ML
BH CV ECHO MEAS - TR MAX VEL: 284.2 CM/SEC
LV EF 2D ECHO EST: 60 %
MAXIMAL PREDICTED HEART RATE: 159 BPM
STRESS TARGET HR: 135 BPM

## 2020-02-27 PROCEDURE — 93306 TTE W/DOPPLER COMPLETE: CPT | Performed by: INTERNAL MEDICINE

## 2020-02-27 NOTE — TELEPHONE ENCOUNTER
----- Message from Jesse Renteria MD sent at 2/27/2020 12:05 PM EST -----  Please notify patient that  Echo was normal

## 2020-02-27 NOTE — TELEPHONE ENCOUNTER
----- Message from Jesse Renteria MD sent at 2/27/2020  9:22 AM EST -----  Please notify patient that  Urine culture was negative

## 2020-02-28 ENCOUNTER — TELEPHONE (OUTPATIENT)
Dept: FAMILY MEDICINE CLINIC | Facility: CLINIC | Age: 60
End: 2020-02-28

## 2020-02-28 NOTE — TELEPHONE ENCOUNTER
Pt called.  Said she is going to stop her b/p med and see if that helps her symptoms she's been having (short of breath and fatigue) since she had same issues with other b/p meds.  She does have a cardiolyte scheduled for 3-10 but wanted to know how long it takes for the med to be out of her system.  Says once it is out of her system she will follow up to discuss starting a different med. She said she will monitor her b/p everyday at home while off med.

## 2020-03-10 ENCOUNTER — HOSPITAL ENCOUNTER (OUTPATIENT)
Dept: NUCLEAR MEDICINE | Facility: HOSPITAL | Age: 60
Discharge: HOME | End: 2020-03-10

## 2020-03-10 PROCEDURE — 0 TECHNETIUM SESTAMIBI: Performed by: FAMILY MEDICINE

## 2020-03-10 PROCEDURE — 93016 CV STRESS TEST SUPVJ ONLY: CPT | Performed by: NURSE PRACTITIONER

## 2020-03-10 PROCEDURE — 78452 HT MUSCLE IMAGE SPECT MULT: CPT

## 2020-03-10 PROCEDURE — 93017 CV STRESS TEST TRACING ONLY: CPT

## 2020-03-10 PROCEDURE — A9500 TC99M SESTAMIBI: HCPCS | Performed by: FAMILY MEDICINE

## 2020-03-10 PROCEDURE — 25010000002 REGADENOSON 0.4 MG/5ML SOLUTION: Performed by: FAMILY MEDICINE

## 2020-03-10 RX ADMIN — REGADENOSON 0.4 MG: 0.08 INJECTION, SOLUTION INTRAVENOUS at 13:35

## 2020-03-10 RX ADMIN — TECHNETIUM TC 99M SESTAMIBI 1 DOSE: 1 INJECTION INTRAVENOUS at 13:45

## 2020-03-10 RX ADMIN — TECHNETIUM TC 99M SESTAMIBI 1 DOSE: 1 INJECTION INTRAVENOUS at 13:35

## 2020-03-11 LAB
BH CV NUCLEAR PRIOR STUDY: 3
BH CV STRESS BP STAGE 1: NORMAL
BH CV STRESS BP STAGE 2: NORMAL
BH CV STRESS COMMENTS STAGE 1: NORMAL
BH CV STRESS COMMENTS STAGE 2: NORMAL
BH CV STRESS DOSE REGADENOSON STAGE 1: 0.4
BH CV STRESS DURATION MIN STAGE 1: 0
BH CV STRESS DURATION MIN STAGE 2: 4
BH CV STRESS DURATION SEC STAGE 1: 10
BH CV STRESS DURATION SEC STAGE 2: 0
BH CV STRESS HR STAGE 1: 69
BH CV STRESS HR STAGE 2: 96
BH CV STRESS PROTOCOL 1: NORMAL
BH CV STRESS RECOVERY BP: NORMAL MMHG
BH CV STRESS RECOVERY HR: 89 BPM
BH CV STRESS STAGE 1: 1
BH CV STRESS STAGE 2: 2
MAXIMAL PREDICTED HEART RATE: 159 BPM
PERCENT MAX PREDICTED HR: 60.38 %
STRESS BASELINE BP: NORMAL MMHG
STRESS BASELINE HR: 69 BPM
STRESS PERCENT HR: 71 %
STRESS POST PEAK BP: NORMAL MMHG
STRESS POST PEAK HR: 96 BPM
STRESS TARGET HR: 135 BPM

## 2020-03-11 PROCEDURE — 93018 CV STRESS TEST I&R ONLY: CPT | Performed by: INTERNAL MEDICINE

## 2020-03-11 PROCEDURE — 78452 HT MUSCLE IMAGE SPECT MULT: CPT | Performed by: INTERNAL MEDICINE

## 2020-03-12 ENCOUNTER — TELEPHONE (OUTPATIENT)
Dept: FAMILY MEDICINE CLINIC | Facility: CLINIC | Age: 60
End: 2020-03-12

## 2020-03-12 NOTE — TELEPHONE ENCOUNTER
----- Message from Jesse Renteria MD sent at 3/12/2020  9:27 AM EDT -----  Please notify patient that  Stress test was ok. Follow up if she continues to have sx.

## 2020-03-16 ENCOUNTER — OFFICE VISIT (OUTPATIENT)
Dept: FAMILY MEDICINE CLINIC | Facility: CLINIC | Age: 60
End: 2020-03-16

## 2020-03-16 VITALS
OXYGEN SATURATION: 98 % | WEIGHT: 185.8 LBS | BODY MASS INDEX: 30.96 KG/M2 | RESPIRATION RATE: 18 BRPM | TEMPERATURE: 98 F | HEART RATE: 89 BPM | HEIGHT: 65 IN

## 2020-03-16 DIAGNOSIS — I10 BENIGN HYPERTENSION: Primary | ICD-10-CM

## 2020-03-16 PROCEDURE — 99213 OFFICE O/P EST LOW 20 MIN: CPT | Performed by: FAMILY MEDICINE

## 2020-03-16 RX ORDER — HYDRALAZINE HYDROCHLORIDE 25 MG/1
25 TABLET, FILM COATED ORAL 3 TIMES DAILY
Qty: 90 TABLET | Refills: 0 | Status: SHIPPED | OUTPATIENT
Start: 2020-03-16 | End: 2020-12-10

## 2020-03-16 NOTE — PROGRESS NOTES
Luz Buchanan is a 59 y.o. female.     BP running high 150-160s/90-100s and dropping low to 110/68 at home. Stopped Metoprolol 25mg on 2/13/20.    Hypertension   The current episode started more than 1 month ago. Associated symptoms include headaches. Pertinent negatives include no chest pain, neck pain or shortness of breath. Past treatments include beta blockers. Current antihypertension treatment includes nothing.      The following portions of the patient's history were reviewed and updated as appropriate: allergies, current medications, past family history, past medical history, past social history, past surgical history and problem list.    Patient Active Problem List   Diagnosis   • Benign hypertension   • Allergic rhinitis   • Hypothyroidism   • Mild intermittent asthma without complication   • Menopausal syndrome   • Acute reaction to stress   • Aortic valve stenosis   • Mixed hyperlipidemia   • Gilbert's syndrome   • Genital herpes simplex   • Acute non-recurrent maxillary sinusitis   • Astigmatism   • Chronic maxillary sinusitis       Current Outpatient Medications on File Prior to Visit   Medication Sig Dispense Refill   • Cholecalciferol (VITAMIN D3) 94173 units tablet Take 1 tablet by mouth 3 (Three) Times a Week.     • Estradiol-Estriol-Progesterone (BIEST/PROGESTERONE TD) DISSOLVE ONE JONATHAN BETWEEN CHEEK AND TEETH TWICE DAILY. ALLOW TO DISSOLVE SLOWLY  4   • levothyroxine (SYNTHROID, LEVOTHROID) 100 MCG tablet Take 1 tablet by mouth Daily. 90 tablet 3   • montelukast (SINGULAIR) 10 MG tablet TAKE ONE TABLET BY MOUTH AT BEDTIME 90 tablet 3   • Progesterone 40 % cream 75mg- 0.25mg cream  5   • [DISCONTINUED] metoprolol succinate XL (TOPROL-XL) 25 MG 24 hr tablet TAKE ONE TABLET BY MOUTH ONCE DAILY 30 tablet 3     No current facility-administered medications on file prior to visit.      Current outpatient and discharge medications have been reconciled for the patient.  Reviewed by: Jesse  Kemal Renteria MD      Allergies   Allergen Reactions   • Codeine Nausea Only and Hallucinations   • Penicillin V Potassium Rash   • Sulfamethoxazole-Trimethoprim Rash   • Cefprozil Itching   • Sulfa Antibiotics Hallucinations   • Penicillins Rash       Review of Systems   Constitutional: Negative for activity change, appetite change, fatigue and fever.   HENT: Negative for ear pain, swollen glands and voice change.    Eyes: Negative for visual disturbance.   Respiratory: Negative for shortness of breath and wheezing.    Cardiovascular: Negative for chest pain and leg swelling.   Gastrointestinal: Negative for abdominal pain, blood in stool, constipation, diarrhea, nausea and vomiting.   Endocrine: Negative for polydipsia and polyuria.   Genitourinary: Negative for dysuria, frequency and hematuria.   Musculoskeletal: Negative for joint swelling, neck pain and neck stiffness.   Skin: Negative for rash and bruise.   Neurological: Negative for weakness, numbness and headache.   Psychiatric/Behavioral: Negative for suicidal ideas and depressed mood.     I have reviewed and confirmed the accuracy of the ROS as documented by the MA/LPN/RN Jesse Renteria MD      Objective   Vitals:    03/16/20 1517   Pulse: 89   Resp: 18   Temp: 98 °F (36.7 °C)   SpO2: 98%     Physical Exam   Constitutional: She is oriented to person, place, and time. She appears well-developed and well-nourished.   HENT:   Head: Normocephalic and atraumatic.   Left Ear: External ear normal.   Nose: Nose normal.   Mouth/Throat: Oropharynx is clear and moist.   Eyes: Pupils are equal, round, and reactive to light. EOM are normal.   Neck: Normal range of motion. Neck supple.   Cardiovascular: Normal rate, regular rhythm and normal heart sounds.   Pulmonary/Chest: Effort normal.   Abdominal: Soft. Bowel sounds are normal.   Musculoskeletal: Normal range of motion.   Neurological: She is alert and oriented to person, place, and time.   Skin: Skin is  warm and dry.   Psychiatric: She has a normal mood and affect. Her behavior is normal. Judgment and thought content normal.         Assessment/Plan .  Problem List Items Addressed This Visit     Benign hypertension - Primary    Relevant Medications    hydrALAZINE (APRESOLINE) 25 MG tablet      Findings discussed. All questions answered.  Medication and medication adverse effects discussed.  Drug education given and explained to patient. Patient verbalized understanding.  Follow-up in 2 weeks if not better.  Follow-up sooner for worsening symptoms or for any concerns.  Follow-up for routine health maintenance as directed

## 2020-07-24 ENCOUNTER — TELEPHONE (OUTPATIENT)
Dept: FAMILY MEDICINE CLINIC | Facility: CLINIC | Age: 60
End: 2020-07-24

## 2020-07-24 NOTE — TELEPHONE ENCOUNTER
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     Requesting a letter stating she's not required to wear a mask due to her asthma.

## 2020-08-07 ENCOUNTER — TELEPHONE (OUTPATIENT)
Dept: FAMILY MEDICINE CLINIC | Facility: CLINIC | Age: 60
End: 2020-08-07

## 2020-08-07 NOTE — TELEPHONE ENCOUNTER
Patient called requesting appointment today 08/07/20 with Dr. Renteria due to having a temp of 102 and having diarrhea since Monday. Patient said that her fever broke but she has lost 15 pounds since Monday and also let her know that Dr. Renteria has no appointments open today 08/07/20. Liz recommended patient to go to the ER or urgent care and patient would not go. Also let her know that we could do a video visit Monday and patient didn't like that either.

## 2020-09-29 ENCOUNTER — OFFICE VISIT (OUTPATIENT)
Dept: FAMILY MEDICINE CLINIC | Facility: CLINIC | Age: 60
End: 2020-09-29

## 2020-09-29 VITALS
BODY MASS INDEX: 29.82 KG/M2 | OXYGEN SATURATION: 98 % | HEIGHT: 65 IN | DIASTOLIC BLOOD PRESSURE: 80 MMHG | HEART RATE: 84 BPM | TEMPERATURE: 97.8 F | RESPIRATION RATE: 18 BRPM | WEIGHT: 179 LBS | SYSTOLIC BLOOD PRESSURE: 116 MMHG

## 2020-09-29 DIAGNOSIS — R60.0 LEG EDEMA, LEFT: Primary | ICD-10-CM

## 2020-09-29 DIAGNOSIS — M79.605 PAIN OF LEFT LOWER EXTREMITY: ICD-10-CM

## 2020-09-29 PROCEDURE — 99214 OFFICE O/P EST MOD 30 MIN: CPT | Performed by: FAMILY MEDICINE

## 2020-09-29 RX ORDER — LEVOFLOXACIN 500 MG/1
500 TABLET, FILM COATED ORAL DAILY
Qty: 10 TABLET | Refills: 0 | Status: SHIPPED | OUTPATIENT
Start: 2020-09-29 | End: 2020-10-14 | Stop reason: SDUPTHER

## 2020-09-29 NOTE — PROGRESS NOTES
Subjective   Irma Buchanan is a 59 y.o. female.     Had legs caught in a lift gate. Both legs from knees down are swollen and red, worse on left leg.    Leg Swelling  The current episode started 1 to 4 weeks ago (9/16/20). The problem occurs constantly. Pertinent negatives include no abdominal pain, chest pain, fatigue, fever, joint swelling, nausea, neck pain, numbness, rash, swollen glands, vomiting or weakness.      The following portions of the patient's history were reviewed and updated as appropriate: allergies, current medications, past family history, past medical history, past social history, past surgical history and problem list.    Patient Active Problem List   Diagnosis   • Benign hypertension   • Allergic rhinitis   • Hypothyroidism   • Mild intermittent asthma without complication   • Menopausal syndrome   • Acute reaction to stress   • Aortic valve stenosis   • Mixed hyperlipidemia   • Gilbert's syndrome   • Genital herpes simplex   • Acute non-recurrent maxillary sinusitis   • Astigmatism   • Chronic maxillary sinusitis       Current Outpatient Medications on File Prior to Visit   Medication Sig Dispense Refill   • Cholecalciferol (VITAMIN D3) 63996 units tablet Take 1 tablet by mouth 3 (Three) Times a Week.     • Estradiol-Estriol-Progesterone (BIEST/PROGESTERONE TD) DISSOLVE ONE JONATHAN BETWEEN CHEEK AND TEETH TWICE DAILY. ALLOW TO DISSOLVE SLOWLY  4   • hydrALAZINE (APRESOLINE) 25 MG tablet Take 1 tablet by mouth 3 (Three) Times a Day. 90 tablet 0   • levothyroxine (SYNTHROID, LEVOTHROID) 100 MCG tablet Take 1 tablet by mouth Daily. 90 tablet 3   • montelukast (SINGULAIR) 10 MG tablet TAKE ONE TABLET BY MOUTH AT BEDTIME 90 tablet 3   • Progesterone 40 % cream 75mg- 0.25mg cream  5     No current facility-administered medications on file prior to visit.      Current outpatient and discharge medications have been reconciled for the patient.  Reviewed by: Jesse Renteria MD      Allergies    Allergen Reactions   • Codeine Nausea Only and Hallucinations   • Penicillin V Potassium Rash   • Sulfamethoxazole-Trimethoprim Rash   • Cefprozil Itching   • Sulfa Antibiotics Hallucinations   • Penicillins Rash       Review of Systems   Constitutional: Negative for activity change, appetite change, fatigue and fever.   HENT: Negative for ear pain, swollen glands and voice change.    Eyes: Negative for visual disturbance.   Respiratory: Negative for shortness of breath and wheezing.    Cardiovascular: Negative for chest pain and leg swelling.   Gastrointestinal: Negative for abdominal pain, blood in stool, constipation, diarrhea, nausea and vomiting.   Endocrine: Negative for polydipsia and polyuria.   Genitourinary: Negative for dysuria, frequency and hematuria.   Musculoskeletal: Negative for joint swelling, neck pain and neck stiffness.   Skin: Negative for rash and bruise.   Neurological: Negative for weakness, numbness and headache.   Psychiatric/Behavioral: Negative for suicidal ideas and depressed mood.     I have reviewed and confirmed the accuracy of the ROS as documented by the MA/LPN/RN Jesse Renteria MD      Objective   Vitals:    09/29/20 1650   BP: 116/80   Pulse: 84   Resp: 18   Temp: 97.8 °F (36.6 °C)   SpO2: 98%     Physical Exam  Constitutional:       Appearance: She is well-developed.   HENT:      Head: Normocephalic and atraumatic.      Right Ear: External ear normal.      Left Ear: External ear normal.      Nose: Nose normal.   Eyes:      Pupils: Pupils are equal, round, and reactive to light.   Neck:      Musculoskeletal: Normal range of motion and neck supple.   Cardiovascular:      Rate and Rhythm: Normal rate and regular rhythm.      Heart sounds: Normal heart sounds.   Pulmonary:      Effort: Pulmonary effort is normal.      Breath sounds: Normal breath sounds.   Abdominal:      General: Bowel sounds are normal.      Palpations: Abdomen is soft.   Musculoskeletal: Normal range of  motion.      Left lower leg: She exhibits tenderness. Edema present.      Comments: LE edema and erythema left LE   Skin:     General: Skin is warm and dry.   Neurological:      Mental Status: She is alert and oriented to person, place, and time.   Psychiatric:         Behavior: Behavior normal.         Thought Content: Thought content normal.         Judgment: Judgment normal.         I wore protective equipment throughout this patient encounter to include mask and eye protection. Hand hygiene was performed before donning protective equipment and after removal when leaving the room.    Assessment/Plan .  Irma was seen today for leg swelling.    Diagnoses and all orders for this visit:    Leg edema, left  -     Duplex Venous Lower Extremity - Left CAR  -     levoFLOXacin (Levaquin) 500 MG tablet; Take 1 tablet by mouth Daily.    Pain of left lower extremity  -     Duplex Venous Lower Extremity - Left CAR    Unable to get stat u/s today at hospital or diagnostics here.  Pt declines ER visit.  Start eliquis empirically pending u/s report.Pt to take order to hospital tomorrow for stat u/s   Risks and benefits of the diagnostic and therapeutic process discussed.  To ER for CP, SOA, concerns

## 2020-10-13 ENCOUNTER — TELEPHONE (OUTPATIENT)
Dept: FAMILY MEDICINE CLINIC | Facility: CLINIC | Age: 60
End: 2020-10-13

## 2020-10-14 DIAGNOSIS — R60.0 LEG EDEMA, LEFT: ICD-10-CM

## 2020-10-14 DIAGNOSIS — E03.9 HYPOTHYROIDISM, UNSPECIFIED TYPE: ICD-10-CM

## 2020-10-15 DIAGNOSIS — R60.0 LEG EDEMA, LEFT: ICD-10-CM

## 2020-10-15 RX ORDER — LEVOTHYROXINE SODIUM 0.1 MG/1
TABLET ORAL
Qty: 90 TABLET | Refills: 3 | Status: SHIPPED | OUTPATIENT
Start: 2020-10-15 | End: 2021-08-31 | Stop reason: SDUPTHER

## 2020-10-15 RX ORDER — LEVOFLOXACIN 500 MG/1
500 TABLET, FILM COATED ORAL DAILY
Qty: 14 TABLET | Refills: 0 | Status: SHIPPED | OUTPATIENT
Start: 2020-10-15 | End: 2020-10-15 | Stop reason: SDUPTHER

## 2020-10-15 RX ORDER — MONTELUKAST SODIUM 10 MG/1
TABLET ORAL
Qty: 90 TABLET | Refills: 3 | Status: SHIPPED | OUTPATIENT
Start: 2020-10-15 | End: 2021-08-31 | Stop reason: SDUPTHER

## 2020-10-15 RX ORDER — LEVOFLOXACIN 500 MG/1
500 TABLET, FILM COATED ORAL DAILY
Qty: 14 TABLET | Refills: 0 | Status: SHIPPED | OUTPATIENT
Start: 2020-10-15 | End: 2021-08-31

## 2020-11-06 ENCOUNTER — TREATMENT (OUTPATIENT)
Dept: PHYSICAL THERAPY | Facility: CLINIC | Age: 60
End: 2020-11-06

## 2020-11-06 DIAGNOSIS — M25.572 ACUTE LEFT ANKLE PAIN: ICD-10-CM

## 2020-11-06 DIAGNOSIS — I89.0 LYMPHEDEMA OF LEFT LOWER EXTREMITY: Primary | ICD-10-CM

## 2020-11-06 PROCEDURE — 97162 PT EVAL MOD COMPLEX 30 MIN: CPT | Performed by: PHYSICAL THERAPIST

## 2020-11-06 PROCEDURE — 97530 THERAPEUTIC ACTIVITIES: CPT | Performed by: PHYSICAL THERAPIST

## 2020-11-06 PROCEDURE — 97110 THERAPEUTIC EXERCISES: CPT | Performed by: PHYSICAL THERAPIST

## 2020-11-06 NOTE — PROGRESS NOTES
"  Physical Therapy Initial Evaluation and Plan of Care    Patient: Irma Buchanan   : 1960  Diagnosis/ICD-10 Code:  Lymphedema of left lower extremity [I89.0]  Referring practitioner: KARIS Arellano  Date of Initial Visit: 2020  Today's Date: 2020  Patient seen for 1 sessions           Subjective Questionnaire: FAAM = 35/84, indicating 58% impairment; LLIS (Lymphedema Life Impact Scale) = 47% impairment      Subjective Evaluation    History of Present Illness  Mechanism of injury: Pt sustained L foot and ankle injury when lift gate on back of semi trailer crushed her legs on 2020. States R lower leg was bruised and L lower leg was much more affected with a \"puncture wound\" and extensive bruising of lower leg. States L LE then started swelling and has progressively worsened. Developed cellulitis with open area on back of L lower leg and had draining from back of leg and front of ankle. Finished Keflex yesterday and was on Levaquin prior to that. 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 Reports recently that when she keeps feet up, swelling does not go down and seems to swell more. Has tried using warm compresses per MD recommendation but this increases swelling also. States today is the best her leg as looked in a while and has been up and moving around. Having difficulty finding shoes to wear and states that her work boots get very tight. Has tingling in L foot and lower leg when pain is really bad. 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 Not able to work due to inability to wear steel-toed boots also. Increased pain and difficulty going up and down stairs. Not able to squat due to ankle pain. Difficulty stepping up/down curbs. Pt has compression stockings that she has used in the past and tried wearing them but were too tight and very painful. Pt wants to get back to work. Had x-ray of L tib-fib " yesterday that was normal.      Patient Occupation: full time  and delivers product Quality of life: excellent    Pain  Current pain ratin  At best pain ratin  At worst pain ratin  Location: L lower leg  Quality: dull ache, burning and tight  Relieving factors: change in position  Aggravating factors: movement, stairs, standing, prolonged positioning and ambulation  Progression: worsening    Diagnostic Tests  X-ray: normal    Patient Goals  Patient goals for therapy: decreased edema, decreased pain, improved balance, increased motion, increased strength, return to work and return to sport/leisure activities             Objective          Observations   Left Ankle/Foot   Positive for edema.     Right Ankle/Foot   Positive for edema.     Additional Ankle/Foot Observation Details  Significant edema present L foot, ankle, and lower leg. Pitting present L LE. Hyperpigmentation and mild erythemia present L ankle and lower leg. No lymphorrhea or open areas. Scar present at posterior aspect of distal L lower leg from healed open area. Indentation present at dorsum of L foot from sandals pt wearing this date. Toenails in good condition B.    Palpation   Left   Tenderness of the anterior tibialis, lateral gastrocnemius, medial gastrocnemius and peroneus.     Tenderness   Left Ankle/Foot   Tenderness in the anterior ankle.     Additional Tenderness Details  General tenderness L ankle and lower leg. Most significant tenderness at dorsum of L foot and anterior aspect L ankle.    Active Range of Motion   Left Knee   Normal active range of motion    Right Knee   Normal active range of motion  Left Ankle/Foot   Dorsiflexion (ke): 0 degrees with pain  Plantar flexion: 35 degrees with pain  Inversion: 0 degrees with pain  Eversion: 10 degrees with pain    Right Ankle/Foot   Normal active range of motion    Passive Range of Motion   Left Ankle/Foot    Dorsiflexion (ke): 3 degrees with pain    Strength/Myotome  Testing     Left Knee   Flexion: 4+  Extension: 4    Right Knee   Flexion: 4+  Extension: 4+    Left Ankle/Foot   Dorsiflexion: 3  Plantar flexion: 3+  Inversion: 3-  Eversion: 3+    Right Ankle/Foot   Dorsiflexion: 4+  Plantar flexion: 4+  Inversion: 4+  Eversion: 4+    Additional Strength Details  Pain with all L ankle strength testing.     LE measurements:   Forefoot  R = 22.6cm, L = 22.8cm  Ankle  R = 23.7cm, L = 25.0cm  Heel +20 cm   R = 29.8cm, L = 33.1cm  Heel +30 cm   R = 38.7cm, L = 40.2cm  Knee jt line  R = 39.4cm, L = 39.3cm      Assessment & Plan     Assessment  Impairments: abnormal gait, abnormal or restricted ROM, activity intolerance, impaired balance, impaired physical strength, lacks appropriate home exercise program, pain with function and weight-bearing intolerance  Assessment details: Pt is 58 yo female with L ankle and foot pain and edema related to injury at work on 9/16/2020. Pt presents with decreased strength and ROM of L ankle. Edema has progressed and is consistent with Stage 1 lymphedema. Pt is having difficulty with ambulation. Difficulty with prolonged sitting and driving. Difficulty wearing appropriate foot wear. Unable to squat. Difficulty with stairs. FAAM indicates 58% impairment and LLIS indicates 47% impairment. Swelling has not receded with elevation and exercise.  Without treatment, pt is at higher risk to develop wounds and infections which could lead to acute care hospitalization. Pt will benefit from use of appropriate fitting compression stockings (15-20mmHg) to manage current swelling. If swelling progresses, will benefit from mechanical compression bandaging.    Patient presents with the impairments listed above and based on the objective findings and the physical therapy evaluation, the patient’s condition has the potential to improve in response to therapy.   The patient’s condition and/or services required are at a level of complexity that necessitates the skill &  supervision of a physical therapist.    Prognosis: good  Functional Limitations: lifting, sleeping, walking, uncomfortable because of pain, sitting, standing, stooping and unable to perform repetitive tasks  Goals  Plan Goals: STG:  - Increase L ankle DF AROM to 10 deg in 3 weeks.  - Pt to be independent with HEP in 3 weeks.  - Decrease edema so that pt can wear appropriate foot wear in 3 weeks.  LTG:  - Improve FAAM and LLIS scores to 20% or less impairment by discharge.  - Increase L ankle strength to 4/5 in all directions by discharge.  - Pt to return to work with max pain rating of 2-3/10 by discharge.  - Pt to be independent with donning garment by discharge.    Plan  Therapy options: will be seen for skilled physical therapy services  Planned modality interventions: electrical stimulation/Russian stimulation  Other planned modality interventions: modalities as indicated  Planned therapy interventions: balance/weight-bearing training, manual therapy, flexibility, functional ROM exercises, gait training, home exercise program, joint mobilization, neuromuscular re-education, soft tissue mobilization, strengthening, stretching, therapeutic activities and compression  Other planned therapy interventions: Complete decongestive therapy (CDT) to include: manual lymph drainage (MLD), mechanical compression bandaging (MCB), skin care, pt education, and exercise.  Frequency: 2x week  Duration in visits: 12  Treatment plan discussed with: patient  Plan details: Secondary to COVID-19 restrictions and current clinic capacity, unable to accommodate PT frequency of 3-4x/wk as indicated on PT order. Pt is very motivated to improve and believe pt will be compliant with HEP and recommended use of compression stocking once she has one.        Timed:         Manual Therapy:    5     mins  36428;     Therapeutic Exercise:    10     mins  94506;     Neuromuscular Mel:        mins  69710;    Therapeutic Activity:     15     mins   24191;     Gait Training:           mins  12360;     Ultrasound:          mins  44887;    Ionto                                   mins   57402  Can Repos          mins 93722    Un-Timed:  Electrical Stimulation:         mins  05978 ( );  Dry Needling          mins self-pay  Traction          mins 85707  Low Eval          Mins  59655  Mod Eval     30     Mins  75353  High Eval                            Mins  58097  Self - Care                          mins  36834        Timed Treatment:   30   mins   Total Treatment:     60   mins    PT SIGNATURE: May Christianson, PT   DATE TREATMENT INITIATED: 11/6/2020    Initial Certification  Certification Period: 2/4/2021  I certify that the therapy services are furnished while this patient is under my care.  The services outlined above are required by this patient, and will be reviewed every 90 days.     PHYSICIAN:  KARIS Arellano     ______________________________________________________________________     DATE:    ___________________________________________    Please sign and return via fax to [phone].. Thank you, [facility].

## 2020-11-09 ENCOUNTER — TREATMENT (OUTPATIENT)
Dept: PHYSICAL THERAPY | Facility: CLINIC | Age: 60
End: 2020-11-09

## 2020-11-09 DIAGNOSIS — I89.0 LYMPHEDEMA OF LEFT LOWER EXTREMITY: Primary | ICD-10-CM

## 2020-11-09 DIAGNOSIS — M25.572 ACUTE LEFT ANKLE PAIN: ICD-10-CM

## 2020-11-09 PROCEDURE — 97110 THERAPEUTIC EXERCISES: CPT | Performed by: PHYSICAL THERAPIST

## 2020-11-09 PROCEDURE — 97140 MANUAL THERAPY 1/> REGIONS: CPT | Performed by: PHYSICAL THERAPIST

## 2020-11-09 NOTE — PROGRESS NOTES
Physical Therapy Daily Progress Note      Patient: Irma Buchanan   : 1960  Diagnosis/ICD-10 Code:  Lymphedema of left lower extremity [I89.0]   Problems Addressed this Visit     None      Visit Diagnoses     Lymphedema of left lower extremity    -  Primary    Acute left ankle pain          Diagnoses       Codes Comments    Lymphedema of left lower extremity    -  Primary ICD-10-CM: I89.0  ICD-9-CM: 457.1     Acute left ankle pain     ICD-10-CM: M25.572  ICD-9-CM: 719.47         Referring practitioner: JETHRO Contreras  Date of Initial Visit: Type: THERAPY  Noted: 2020  Today's Date: 2020    VISIT#: 2    Subjective : Pt states she has been using tubi- sleeve issued at last visit and feels like it helps. Reports that she over did it with exercises and ankle has been more sore. Feels like swelling is a little better, has been trying to stay moving around. Contacted workers comp regarding getting compression stocking and was told she would need MD order for it to be covered.      Objective : Added MLD to L LE this date. Instructed pt to do exercises in gentle ROM and not with strong intensity. Pt verbalized understanding. Tender to light palpation L proximal gastro and L achilles.    See Exercise, Manual, and Modality Logs for complete treatment.     Forefoot  R = 22.8cm, L = 22.8cm  Malleolus  R = 23.0cm, L = 23.7cm  Heel +20 cm   R = 29.2cm, L = 31.2cm  Heel +30 cm   R = 38.4cm, L = 39.1cm    Assessment/Plan : Good tolerance to MLD. Slight decrease in girth measurements B LE. Pt will benefit from continued L ankle strengthening to improve ambulation tolerance and for return to work.    Progress per Plan of Care and Progress strengthening /stabilization /functional activity         Timed:         Manual Therapy:    25     mins  38184;     Therapeutic Exercise:    15     mins  61316;     Neuromuscular Mel:        mins  34600;    Therapeutic Activity:          mins  92607;     Gait Training:            mins  72466;     Ultrasound:          mins  21982;    Ionto                                   mins   80579  Self Care                            mins   69607  Canalith Repos                   mins  78998    Un-Timed:  Electrical Stimulation:         mins  75340 ( );  Dry Needling          mins self-pay  Traction          mins 64955  Low Eval          Mins  54784  Mod Eval          Mins  95865  High Eval                            Mins  29237  Re-Eval                               mins  46200    Timed Treatment:   40   mins   Total Treatment:     40   mins    May Christianson, PT  Physical Therapist

## 2020-11-11 ENCOUNTER — TREATMENT (OUTPATIENT)
Dept: PHYSICAL THERAPY | Facility: CLINIC | Age: 60
End: 2020-11-11

## 2020-11-11 DIAGNOSIS — I89.0 LYMPHEDEMA OF LEFT LOWER EXTREMITY: Primary | ICD-10-CM

## 2020-11-11 DIAGNOSIS — M25.572 ACUTE LEFT ANKLE PAIN: ICD-10-CM

## 2020-11-11 PROCEDURE — 97035 APP MDLTY 1+ULTRASOUND EA 15: CPT | Performed by: PHYSICAL THERAPIST

## 2020-11-11 PROCEDURE — 97140 MANUAL THERAPY 1/> REGIONS: CPT | Performed by: PHYSICAL THERAPIST

## 2020-11-11 NOTE — PROGRESS NOTES
Physical Therapy Daily Progress Note      Patient: Irma Buchanan   : 1960  Diagnosis/ICD-10 Code:  Lymphedema of left lower extremity [I89.0]   Problems Addressed this Visit     None      Visit Diagnoses     Lymphedema of left lower extremity    -  Primary    Acute left ankle pain          Diagnoses       Codes Comments    Lymphedema of left lower extremity    -  Primary ICD-10-CM: I89.0  ICD-9-CM: 457.1     Acute left ankle pain     ICD-10-CM: M25.572  ICD-9-CM: 719.47         Referring practitioner: JETHRO Contreras  Date of Initial Visit: Type: THERAPY  Noted: 2020  Today's Date: 2020    VISIT#: 3    Subjective : Pt reports increased pain and swelling today. Has backed off of exercises some. States even though she is having more pain, she is better than before starting PT.      Objective : Most ther ex held this date due to pain. Add US today at nonthermal setting to L achilles.    See Exercise, Manual, and Modality Logs for complete treatment.     Forefoot   L = 22.7cm  Malleolus   L = 24.0cm  Heel +20 cm    L = 31.0cm  Heel +30 cm  L = 38.6cm      Assessment/Plan : Increased pain at start of session. Assess response to US. Resume ther ex if tolerated.    Progress per Plan of Care         Timed:         Manual Therapy:    25     mins  21878;     Therapeutic Exercise:    5     mins  53100;     Neuromuscular Mel:        mins  28541;    Therapeutic Activity:          mins  84007;     Gait Training:           mins  63453;     Ultrasound:     8     mins  98215;    Ionto                                   mins   93606  Self Care                            mins   51182  Canalith Repos                   mins  74211    Un-Timed:  Electrical Stimulation:         mins  40567 ( );  Dry Needling          mins self-pay  Traction          mins 43229  Low Eval          Mins  41639  Mod Eval          Mins  41876  High Eval                            Mins  36566  Re-Eval                                mins  03163    Timed Treatment:   38   mins   Total Treatment:     38   mins    May Christianson, PT  Physical Therapist

## 2020-11-16 ENCOUNTER — TREATMENT (OUTPATIENT)
Dept: PHYSICAL THERAPY | Facility: CLINIC | Age: 60
End: 2020-11-16

## 2020-11-16 DIAGNOSIS — M25.572 ACUTE LEFT ANKLE PAIN: ICD-10-CM

## 2020-11-16 DIAGNOSIS — I89.0 LYMPHEDEMA OF LEFT LOWER EXTREMITY: Primary | ICD-10-CM

## 2020-11-16 PROCEDURE — 97140 MANUAL THERAPY 1/> REGIONS: CPT | Performed by: PHYSICAL THERAPIST

## 2020-11-16 PROCEDURE — 97035 APP MDLTY 1+ULTRASOUND EA 15: CPT | Performed by: PHYSICAL THERAPIST

## 2020-11-16 PROCEDURE — 97110 THERAPEUTIC EXERCISES: CPT | Performed by: PHYSICAL THERAPIST

## 2020-11-16 NOTE — PROGRESS NOTES
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 Pt. Reports motion still causes pain at times particularly when walking on uneven rocks or ground.    Objective   See Exercise, Manual, and Modality Logs for complete treatment.     Assessment/Plan   Pt. Tolerates treatment well this date however ROM at ankle is minimal without discomfort particularly into inversion and dorsiflexion. Pt. Has minimal noticeable swelling in foot this visit showing improvement there. Pt. Tolerates addition of NuStep for strength, endurance, and mobility well this visit.    Progress per Plan of Care           Timed:         Manual Therapy:    15     mins  59990;     Therapeutic Exercise:    15     mins  03404;     Neuromuscular Mel:        mins  14745;    Therapeutic Activity:          mins  64541;     Gait Training:           mins  27001;     Ultrasound:     8     mins  30322;    Ionto                                   mins   33633  Self Care                            mins   06859  Canalith Repos                   mins  4209    Un-Timed:  Electrical Stimulation:         mins  40450 ( );  Dry Needling          mins self-pay  Traction          mins 65405  Low Eval          Mins  05620  Mod Eval          Mins  32022  High Eval                            Mins  59878    Timed Treatment:   38   mins   Total Treatment:     38   mins    Ramonita Ye PTA  Physical Therapist  Assistant License #[id number]

## 2020-11-19 ENCOUNTER — TREATMENT (OUTPATIENT)
Dept: PHYSICAL THERAPY | Facility: CLINIC | Age: 60
End: 2020-11-19

## 2020-11-19 DIAGNOSIS — M25.572 ACUTE LEFT ANKLE PAIN: ICD-10-CM

## 2020-11-19 DIAGNOSIS — I89.0 LYMPHEDEMA OF LEFT LOWER EXTREMITY: Primary | ICD-10-CM

## 2020-11-19 PROCEDURE — 97035 APP MDLTY 1+ULTRASOUND EA 15: CPT | Performed by: PHYSICAL THERAPIST

## 2020-11-19 PROCEDURE — 97140 MANUAL THERAPY 1/> REGIONS: CPT | Performed by: PHYSICAL THERAPIST

## 2020-11-20 NOTE — PROGRESS NOTES
Physical Therapy Daily Progress Note      Patient: Irma Buchanan   : 1960  Diagnosis/ICD-10 Code:  Lymphedema of left lower extremity [I89.0]   Problems Addressed this Visit     None      Visit Diagnoses     Lymphedema of left lower extremity    -  Primary    Acute left ankle pain          Diagnoses       Codes Comments    Lymphedema of left lower extremity    -  Primary ICD-10-CM: I89.0  ICD-9-CM: 457.1     Acute left ankle pain     ICD-10-CM: M25.572  ICD-9-CM: 719.47         Referring practitioner: JETHRO Contreras  Date of Initial Visit: Type: THERAPY  Noted: 2020  Today's Date: 2020    VISIT#: 5    Subjective : Feels like US is helping with achilles pain. Pt reports having increased soreness after last visit. States she is having sharp pain from heel and up her calf she first stands up in the morning. Has tried to wear and shoe but cannot stand to have shoe rubbing the back of her heel. Saw work comp PA and he was pleased with progress and said to continue PT. Pt states PA was glad with treatment that has been provided including the lymphatic drainage.      Objective : Most ther ex held this date due to c/o increased pain. Applied kinesiotape to L gastroc today for probable achilles tendonitis.    See Exercise, Manual, and Modality Logs for complete treatment.     Assessment/Plan : Decreased achilles pain overall but increased ankle pain. Continue with US to achilles. Assess response to kinesiotape. Plan to resume gentle ther ex next visit as tolerated. Will benefit from use of compression stocking to manage and contain edema and improve ability to wear a shoe.    Progress per Plan of Care         Timed:         Manual Therapy:    30     mins  37187;     Therapeutic Exercise:    5     mins  01435;     Neuromuscular Mel:        mins  06958;    Therapeutic Activity:          mins  17411;     Gait Training:           mins  50732;     Ultrasound:    8      mins  84776;    Ionto                                    mins   73343  Self Care                            mins   69296  Canalith Repos                   mins  24888    Un-Timed:  Electrical Stimulation:         mins  39169 ( );  Dry Needling          mins self-pay  Traction          mins 27158  Low Eval          Mins  30251  Mod Eval          Mins  76828  High Eval                            Mins  75881  Re-Eval                               mins  90079    Timed Treatment:   43   mins   Total Treatment:     43   mins    May Christianson, PT  Physical Therapist

## 2020-11-24 ENCOUNTER — TREATMENT (OUTPATIENT)
Dept: PHYSICAL THERAPY | Facility: CLINIC | Age: 60
End: 2020-11-24

## 2020-11-24 DIAGNOSIS — I89.0 LYMPHEDEMA OF LEFT LOWER EXTREMITY: Primary | ICD-10-CM

## 2020-11-24 DIAGNOSIS — M25.572 ACUTE LEFT ANKLE PAIN: ICD-10-CM

## 2020-11-24 PROCEDURE — 97035 APP MDLTY 1+ULTRASOUND EA 15: CPT | Performed by: PHYSICAL THERAPIST

## 2020-11-24 PROCEDURE — 97140 MANUAL THERAPY 1/> REGIONS: CPT | Performed by: PHYSICAL THERAPIST

## 2020-11-24 PROCEDURE — 97014 ELECTRIC STIMULATION THERAPY: CPT | Performed by: PHYSICAL THERAPIST

## 2020-11-24 NOTE — PROGRESS NOTES
Physical Therapy Daily Progress Note      Patient: Irma Buchanan   : 1960  Diagnosis/ICD-10 Code:  Lymphedema of left lower extremity [I89.0]   Problems Addressed this Visit     None      Visit Diagnoses     Lymphedema of left lower extremity    -  Primary    Acute left ankle pain          Diagnoses       Codes Comments    Lymphedema of left lower extremity    -  Primary ICD-10-CM: I89.0  ICD-9-CM: 457.1     Acute left ankle pain     ICD-10-CM: M25.572  ICD-9-CM: 719.47         Referring practitioner: JETHRO Contreras  Date of Initial Visit: Type: THERAPY  Noted: 2020  Today's Date: 2020    VISIT#: 6    Subjective : 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 Also did some walking in shoes with no support while out shopping for some better shoes. Feels like ankle ROM is improving but stays sore. Still getting sharp pain up back of her leg. States she feels like kinesiotape helped and it stayed on x3 days.      Objective : Very tender to palpation along L peroneal with increased tension noted. Added inversion stretch to HEP. Also added e-stim to L gastro/peroneal for pain management.     See Exercise, Manual, and Modality Logs for complete treatment.     Assessment/Plan : Increased pain and tenderness L gastroc and L peroneal this date, most likely from increased activity yesterday. Assess response to e-stim. Will benefit from continued ROM and L ankle strengthening.    Progress per Plan of Care         Timed:         Manual Therapy:    30     mins  51875;     Therapeutic Exercise:    5     mins  84540;     Neuromuscular Mel:        mins  19560;    Therapeutic Activity:          mins  43365;     Gait Training:           mins  33402;     Ultrasound:    8      mins  64598;    Ionto                                   mins   44751  Self Care                            mins   90964  Canalith Repos                   mins  91005    Un-Timed:  Electrical  Stimulation:    15     mins  10983 ( );  Dry Needling          mins self-pay  Traction          mins 16705  Low Eval          Mins  52601  Mod Eval          Mins  53203  High Eval                            Mins  81034  Re-Eval                               mins  80970    Timed Treatment:   43   mins   Total Treatment:     58   mins    May Christianson, PT  Physical Therapist

## 2020-11-30 ENCOUNTER — OFFICE VISIT (OUTPATIENT)
Dept: FAMILY MEDICINE CLINIC | Facility: CLINIC | Age: 60
End: 2020-11-30

## 2020-11-30 ENCOUNTER — TREATMENT (OUTPATIENT)
Dept: PHYSICAL THERAPY | Facility: CLINIC | Age: 60
End: 2020-11-30

## 2020-11-30 VITALS
WEIGHT: 177.6 LBS | DIASTOLIC BLOOD PRESSURE: 90 MMHG | HEART RATE: 107 BPM | TEMPERATURE: 98 F | BODY MASS INDEX: 29.59 KG/M2 | HEIGHT: 65 IN | SYSTOLIC BLOOD PRESSURE: 156 MMHG | OXYGEN SATURATION: 98 % | RESPIRATION RATE: 17 BRPM

## 2020-11-30 DIAGNOSIS — M25.572 ACUTE LEFT ANKLE PAIN: ICD-10-CM

## 2020-11-30 DIAGNOSIS — I89.0 LYMPHEDEMA OF LEFT LOWER EXTREMITY: Primary | ICD-10-CM

## 2020-11-30 DIAGNOSIS — I10 UNCONTROLLED HYPERTENSION: Primary | ICD-10-CM

## 2020-11-30 PROCEDURE — 99214 OFFICE O/P EST MOD 30 MIN: CPT | Performed by: FAMILY MEDICINE

## 2020-11-30 RX ORDER — AZELASTINE 1 MG/ML
SPRAY, METERED NASAL
COMMUNITY
Start: 2020-11-13

## 2020-11-30 RX ORDER — HYDROCHLOROTHIAZIDE 12.5 MG/1
12.5 TABLET ORAL DAILY
Qty: 30 TABLET | Refills: 0 | Status: SHIPPED | OUTPATIENT
Start: 2020-11-30 | End: 2020-12-21

## 2020-11-30 NOTE — PROGRESS NOTES
Physical Therapy Daily Progress Note      Patient: Irma Buchanan   : 1960  Diagnosis/ICD-10 Code:  Lymphedema of left lower extremity [I89.0]   Problems Addressed this Visit     None      Visit Diagnoses     Lymphedema of left lower extremity    -  Primary    Acute left ankle pain          Diagnoses       Codes Comments    Lymphedema of left lower extremity    -  Primary ICD-10-CM: I89.0  ICD-9-CM: 457.1     Acute left ankle pain     ICD-10-CM: M25.572  ICD-9-CM: 719.47         Referring practitioner: KARIS Arellano  Date of Initial Visit: Type: THERAPY  Noted: 2020  Today's Date: 2020    VISIT#: 7    Subjective   Pt came in for scheduled appt today and stated that her BP has been high all day and having a terrible HA. States she takes BP med 3x/day and had already taken AM and noon dose. States she did get compression stockings and her leg feels better with them on. States she tried wearing a shoe yesterday and was able to tolerate it x10 min. States back of leg feels so tight and like it might explode.         Objective : PT assess BP: 184/106  PT called MD office and pt to see MD later today. Pt in agreement with this.      Treatment held this date due to increased BP    Assessment/Plan : BP high and treatment held. No charge for this visit.    Progress per Plan of Care         Timed:         Manual Therapy:         mins  86049;     Therapeutic Exercise:         mins  79913;     Neuromuscular Mel:        mins  06150;    Therapeutic Activity:          mins  94948;     Gait Training:           mins  29871;     Ultrasound:          mins  29149;    Ionto                                   mins   39609  Self Care                            mins   21491  Canalith Repos                   mins  19629    Un-Timed:  Electrical Stimulation:         mins  64495 ( );  Dry Needling          mins self-pay  Traction          mins 16345  Low Eval          Mins  39645  Mod Eval          Mins   60573  High Eval                            Mins  26356  Re-Eval                               mins  34771    Timed Treatment:      mins   Total Treatment:        mins    May Christianson, PT  Physical Therapist

## 2020-11-30 NOTE — PROGRESS NOTES
Physical Therapy No-Charge Visit      Date of Office Visit:  2020  Encounter Provider:  May Christianson, PT  Place of Service:  [facility]ER[facility]  Patient Name: Irma Buchanan  :  1960      Pt came in for scheduled appt today and stated that her BP has been high all day and having a terrible HA. States she takes BP med 3x/day and had already taken AM and noon dose. States she did get compression stockings and her leg feels better with them on. States she tried wearing a shoe yesterday and was able to tolerate it x10 min. States back of leg feels so tight and like it might explode.    PT assess BP: 184/106  PT called MD office and pt to see MD later today. Pt in agreement with this.     No charge for this visit.    May Christianson, PT

## 2020-11-30 NOTE — PROGRESS NOTES
Luz Buchanan is a 60 y.o. female.     Chief Complaint   Patient presents with   • Hypertension       Hypertension  This is a new problem. The current episode started in the past 7 days. The problem has been waxing and waning since onset. Associated symptoms include headaches. Pertinent negatives include no chest pain, neck pain or shortness of breath. There are no associated agents to hypertension. Risk factors for coronary artery disease include post-menopausal state. Treatments tried: hydralzine  The current treatment provides mild improvement. There are no compliance problems.         The following portions of the patient's history were reviewed and updated as appropriate: allergies, current medications, past family history, past medical history, past social history, past surgical history and problem list.    Allergies:  Allergies   Allergen Reactions   • Codeine Nausea Only and Hallucinations   • Penicillin V Potassium Rash   • Sulfamethoxazole-Trimethoprim Rash   • Cefprozil Itching   • Sulfa Antibiotics Hallucinations   • Penicillins Rash       Social History:  Social History     Socioeconomic History   • Marital status:      Spouse name: Not on file   • Number of children: Not on file   • Years of education: Not on file   • Highest education level: Not on file   Tobacco Use   • Smoking status: Never Smoker   • Smokeless tobacco: Never Used   Substance and Sexual Activity   • Alcohol use: No     Frequency: Never   • Drug use: No   • Sexual activity: Yes     Partners: Male     Birth control/protection: None       Family History:  Family History   Problem Relation Age of Onset   • Heart disease Mother    • Stomach cancer Father        Past Medical History :  Patient Active Problem List   Diagnosis   • Benign hypertension   • Allergic rhinitis   • Hypothyroidism   • Mild intermittent asthma without complication   • Menopausal syndrome   • Acute reaction to stress   • Aortic valve stenosis    • Mixed hyperlipidemia   • Gilbert's syndrome   • Genital herpes simplex   • Acute non-recurrent maxillary sinusitis   • Astigmatism   • Chronic maxillary sinusitis       Medication List:  Outpatient Encounter Medications as of 11/30/2020   Medication Sig Dispense Refill   • azelastine (ASTELIN) 0.1 % nasal spray      • Cholecalciferol (VITAMIN D3) 96745 units tablet Take 1 tablet by mouth 3 (Three) Times a Week.     • Estradiol-Estriol-Progesterone (BIEST/PROGESTERONE TD) DISSOLVE ONE JONATHAN BETWEEN CHEEK AND TEETH TWICE DAILY. ALLOW TO DISSOLVE SLOWLY  4   • hydrALAZINE (APRESOLINE) 25 MG tablet Take 1 tablet by mouth 3 (Three) Times a Day. 90 tablet 0   • levothyroxine (SYNTHROID, LEVOTHROID) 100 MCG tablet TAKE ONE TABLET BY MOUTH ONCE DAILY 90 tablet 3   • montelukast (SINGULAIR) 10 MG tablet TAKE ONE TABLET BY MOUTH ONCE DAILY AT BEDTIME 90 tablet 3   • Progesterone 40 % cream 75mg- 0.25mg cream  5   • hydroCHLOROthiazide (HYDRODIURIL) 12.5 MG tablet Take 1 tablet by mouth Daily. 30 tablet 0   • levoFLOXacin (Levaquin) 500 MG tablet Take 1 tablet by mouth Daily. 14 tablet 0     No facility-administered encounter medications on file as of 11/30/2020.        Past Surgical History:  Past Surgical History:   Procedure Laterality Date   • BLADDER SURGERY     • CHOLECYSTECTOMY     • COLONOSCOPY     • HYSTERECTOMY     • TONSILLECTOMY         Review of Systems:  Review of Systems   Constitutional: Negative for activity change, appetite change, fatigue and fever.   HENT: Negative for ear pain, swollen glands and voice change.    Eyes: Negative for visual disturbance.   Respiratory: Negative for shortness of breath and wheezing.    Cardiovascular: Negative for chest pain and leg swelling.   Gastrointestinal: Negative for abdominal pain, blood in stool, constipation, diarrhea, nausea and vomiting.   Endocrine: Negative for polydipsia and polyuria.   Genitourinary: Negative for dysuria, frequency and hematuria.  "  Musculoskeletal: Negative for joint swelling, neck pain and neck stiffness.   Skin: Negative for rash and bruise.   Neurological: Positive for headache. Negative for weakness and numbness.   Psychiatric/Behavioral: Negative for suicidal ideas and depressed mood.       I have reviewed and confirmed the accuracy of the HPI and ROS as documented by the MA/LPN/RN Jesse Renteria MD    Vital Signs:  Visit Vitals  /90   Pulse 107   Temp 98 °F (36.7 °C)   Resp 17   Ht 165.1 cm (65\")   Wt 80.6 kg (177 lb 9.6 oz)   SpO2 98%   BMI 29.55 kg/m²       Physical Exam  Constitutional:       Appearance: She is well-developed.   HENT:      Head: Normocephalic and atraumatic.      Left Ear: External ear normal.      Nose: Nose normal.   Eyes:      Pupils: Pupils are equal, round, and reactive to light.   Neck:      Musculoskeletal: Normal range of motion and neck supple.   Cardiovascular:      Rate and Rhythm: Normal rate and regular rhythm.      Heart sounds: Normal heart sounds.   Pulmonary:      Effort: Pulmonary effort is normal.   Abdominal:      General: Bowel sounds are normal.      Palpations: Abdomen is soft.   Musculoskeletal: Normal range of motion.   Skin:     General: Skin is warm and dry.   Neurological:      Mental Status: She is alert and oriented to person, place, and time.   Psychiatric:         Behavior: Behavior normal.         Thought Content: Thought content normal.         Judgment: Judgment normal.         Assessment and Plan:  Problem List Items Addressed This Visit     None      Visit Diagnoses     Uncontrolled hypertension    -  Primary    Relevant Medications    hydroCHLOROthiazide (HYDRODIURIL) 12.5 MG tablet    Other Relevant Orders    Duplex Renal Artery - Bilateral Complete CAR    CBC Auto Differential    Comprehensive Metabolic Panel    TSH    Catecholamine+VMA, 24-Hr Urine - Urine, Clean Catch    Cortisol - AM      Findings discussed. All questions answered.  Medication and medication " adverse effects discussed.  Drug education given and explained to patient. Patient verbalized understanding.    Follow-up after testing complete, sooner for worsening symptoms or any concerns  To ER if sx persist ors sx change     An After Visit Summary and PPPS were given to the patient.       I wore protective equipment throughout this patient encounter to include mask and eye protection. Hand hygiene was performed before donning protective equipment and after removal when leaving the room.

## 2020-12-01 ENCOUNTER — TELEPHONE (OUTPATIENT)
Dept: FAMILY MEDICINE CLINIC | Facility: CLINIC | Age: 60
End: 2020-12-01

## 2020-12-01 NOTE — TELEPHONE ENCOUNTER
Pt called stating they cannot do her duplex scan downstairs so she needs it done at [facility].  Order faxed to [facility]-she will call and schedule it.

## 2020-12-02 ENCOUNTER — TREATMENT (OUTPATIENT)
Dept: PHYSICAL THERAPY | Facility: CLINIC | Age: 60
End: 2020-12-02

## 2020-12-02 DIAGNOSIS — M25.572 ACUTE LEFT ANKLE PAIN: ICD-10-CM

## 2020-12-02 DIAGNOSIS — I89.0 LYMPHEDEMA OF LEFT LOWER EXTREMITY: Primary | ICD-10-CM

## 2020-12-02 PROCEDURE — 97035 APP MDLTY 1+ULTRASOUND EA 15: CPT | Performed by: PHYSICAL THERAPIST

## 2020-12-02 PROCEDURE — 97110 THERAPEUTIC EXERCISES: CPT | Performed by: PHYSICAL THERAPIST

## 2020-12-02 PROCEDURE — 97140 MANUAL THERAPY 1/> REGIONS: CPT | Performed by: PHYSICAL THERAPIST

## 2020-12-02 NOTE — PROGRESS NOTES
Physical Therapy Daily Progress Note      Patient: Irma Buchanan   : 1960  Diagnosis/ICD-10 Code:  Lymphedema of left lower extremity [I89.0]   Problems Addressed this Visit     None      Visit Diagnoses     Lymphedema of left lower extremity    -  Primary    Acute left ankle pain          Diagnoses       Codes Comments    Lymphedema of left lower extremity    -  Primary ICD-10-CM: I89.0  ICD-9-CM: 457.1     Acute left ankle pain     ICD-10-CM: M25.572  ICD-9-CM: 719.47         Referring practitioner: JETHRO Contreras  Date of Initial Visit: Type: THERAPY  Noted: 2020  Today's Date: 2020    VISIT#: 8    Subjective : States BP is still running high and all over the place. Is having kidney US and lab to check cortisol levels due to suspected kidney/adrenal gland involvement in high BP. Pt states she had work comp MD appt yesterday, is to continue PT and MD instructed pt to put heat on L calf. States her ankle/leg is feeling better overall and at times has now pain. But after sitting, has sharp pain up leg and this has caused her to fall 2x. 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 Using her TENS unit at home to L calf as needed.      Objective : significant tenderness proximal achilles, distal gastroc, and medial & lateral aspects of soleus. Neural tension present with c/o posterior thigh pain with gastroc stretch in supine.Reviewed with pt that heat is not recommended with lymphedema but PT in agreement that heat may benefit pt in regards to soft tissue injury of L calf. Instructed pt to use her heating pad set on LOW x15 min with compression stocking on and to monitor how her leg reacts to this. If no increase in edema, ok to continue use of heat to calf on LOW setting for 15-20 min at a time. Pt verbalized understanding and in agreement with this.    See Exercise, Manual, and Modality Logs for complete treatment.      Assessment/Plan : L LE edema well controlled this date with little to no swelling noted. Continued pain and tenderness to L calf. Good tolerance to manual techniques with STM painful. Will benefit from continued PT to increase L ankle ROM and strength for improved tolerance to ambulation and functional activity.    Progress per Plan of Care and Progress strengthening /stabilization /functional activity         Timed:         Manual Therapy:    15     mins  96951;     Therapeutic Exercise:    15     mins  43240;     Neuromuscular Mel:        mins  37468;    Therapeutic Activity:          mins  94104;     Gait Training:           mins  35712;     Ultrasound:     8     mins  23088;    Ionto                                   mins   13683  Self Care                            mins   06893  Canalith Repos                   mins  89301    Un-Timed:  Electrical Stimulation:         mins  39486 ( );  Dry Needling          mins self-pay  Traction          mins 14945  Low Eval          Mins  54943  Mod Eval          Mins  83051  High Eval                            Mins  10840  Re-Eval                               mins  93112    Timed Treatment:   38   mins   Total Treatment:     38   mins    May Christianson, PT  Physical Therapist

## 2020-12-04 ENCOUNTER — TREATMENT (OUTPATIENT)
Dept: PHYSICAL THERAPY | Facility: CLINIC | Age: 60
End: 2020-12-04

## 2020-12-04 DIAGNOSIS — I89.0 LYMPHEDEMA OF LEFT LOWER EXTREMITY: Primary | ICD-10-CM

## 2020-12-04 DIAGNOSIS — M25.572 ACUTE LEFT ANKLE PAIN: ICD-10-CM

## 2020-12-04 PROCEDURE — 97140 MANUAL THERAPY 1/> REGIONS: CPT | Performed by: PHYSICAL THERAPIST

## 2020-12-04 PROCEDURE — 97110 THERAPEUTIC EXERCISES: CPT | Performed by: PHYSICAL THERAPIST

## 2020-12-04 PROCEDURE — 97035 APP MDLTY 1+ULTRASOUND EA 15: CPT | Performed by: PHYSICAL THERAPIST

## 2020-12-04 NOTE — PROGRESS NOTES
Physical Therapy Daily Progress Note      Patient: Irma Buchanan   : 1960  Diagnosis/ICD-10 Code:  Lymphedema of left lower extremity [I89.0]   Problems Addressed this Visit     None      Visit Diagnoses     Lymphedema of left lower extremity    -  Primary    Acute left ankle pain          Diagnoses       Codes Comments    Lymphedema of left lower extremity    -  Primary ICD-10-CM: I89.0  ICD-9-CM: 457.1     Acute left ankle pain     ICD-10-CM: M25.572  ICD-9-CM: 719.47         Referring practitioner: JETHRO Contreras  Date of Initial Visit: Type: THERAPY  Noted: 2020  Today's Date: 2020    VISIT#: 9    Subjective : pt states she has noticed she is wearing down her L shoe in a different spot. Continued pain in L calf and down to heel and across front of ankle and foot. Wore her barn boots yesterday and could only tolerate them x15 min, pain from rubbing achilles tendon. Walked on uneven ground for >5 min and had a lot of difficulty and increased pain. Tried wearing her work boots and had to take them off after 5 min. Tried going up and down her stairs in reciprocal pattern (to simulate stepping up on work lift gate) and it was very painful. States she did not put heat on leg but did take a warm bath and felt like leg was starting to swell more. Rates pain at 7-8/10 with activity.    FAAM = 48/84, indicating 43% impairment  LIIS = 7% impairment    Objective : L LE edema has improved with use of knee high compression stockings that pt purchased out-of-pocket. Tenderness throughout L gastroc, soleus, and achilles. Increased tension noted L gastroc. Performed US on continuous duty cycle this date and instructed pt to observe for increased swelling today. Performed significant STM to L gastroc and noted decreased tension post-tx. Instructed pt to perform gastroc stretching regularly at home. Encouraged pt to write down when she attempts activities that would be similar to work tasks and her  tolerance to wearing work boots and shoes that touch back of heel. Encouraged pt to apply heat to proximal half of lower leg. Pt verbalized understanding.    L ankle DF AROM = 5 deg  L ankle strength grossly 3+/5    See Exercise, Manual, and Modality Logs for complete treatment.     Assessment/Plan : Continued pain L calf and ankle. Poor tolerance to wearing shoes that touch back of leg. Significant tightness and tension L gastroc. Pt may benefit from further imaging to rule out soft tissue injury in calf and ankle. Pt has met 3/7 goals.    STG:  - Increase L ankle DF AROM to 10 deg in 3 weeks. - Progressing  - Pt to be independent with HEP in 3 weeks. - MET  - Decrease edema so that pt can wear appropriate foot wear in 3 weeks. - MET  LTG:  - Improve FAAM and LLIS scores to 20% or less impairment by discharge. - Progressing  - Increase L ankle strength to 4/5 in all directions by discharge. - Not met  - Pt to return to work with max pain rating of 2-3/10 by discharge. - Not met  - Pt to be independent with donning garment by discharge. - MET    Progress per Plan of Care and Progress strengthening /stabilization /functional activity         Timed:         Manual Therapy:    20     mins  83571;     Therapeutic Exercise:    10     mins  82504;     Neuromuscular Mel:        mins  86403;    Therapeutic Activity:          mins  91239;     Gait Training:           mins  55224;     Ultrasound:     8     mins  71116;    Ionto                                   mins   95741  Self Care                            mins   25834  Canalith Repos                   mins  53138    Un-Timed:  Electrical Stimulation:         mins  23873 ( );  Dry Needling          mins self-pay  Traction          mins 10898  Low Eval          Mins  41230  Mod Eval          Mins  36736  High Eval                            Mins  03377  Re-Eval                               mins  48148    Timed Treatment:   38   mins   Total Treatment:     38    mins    May Christianson, PT  Physical Therapist

## 2020-12-07 ENCOUNTER — TREATMENT (OUTPATIENT)
Dept: PHYSICAL THERAPY | Facility: CLINIC | Age: 60
End: 2020-12-07

## 2020-12-07 DIAGNOSIS — M25.572 ACUTE LEFT ANKLE PAIN: ICD-10-CM

## 2020-12-07 DIAGNOSIS — I89.0 LYMPHEDEMA OF LEFT LOWER EXTREMITY: Primary | ICD-10-CM

## 2020-12-07 PROCEDURE — 97140 MANUAL THERAPY 1/> REGIONS: CPT | Performed by: PHYSICAL THERAPIST

## 2020-12-07 PROCEDURE — 97110 THERAPEUTIC EXERCISES: CPT | Performed by: PHYSICAL THERAPIST

## 2020-12-07 PROCEDURE — 97035 APP MDLTY 1+ULTRASOUND EA 15: CPT | Performed by: PHYSICAL THERAPIST

## 2020-12-07 NOTE — PROGRESS NOTES
Physical Therapy Daily Progress Note      Patient: Irma Buchanan   : 1960  Diagnosis/ICD-10 Code:  Lymphedema of left lower extremity [I89.0]   Problems Addressed this Visit     None      Visit Diagnoses     Lymphedema of left lower extremity    -  Primary    Acute left ankle pain          Diagnoses       Codes Comments    Lymphedema of left lower extremity    -  Primary ICD-10-CM: I89.0  ICD-9-CM: 457.1     Acute left ankle pain     ICD-10-CM: M25.572  ICD-9-CM: 719.47         Referring practitioner: JETHRO Contreras  Date of Initial Visit: Type: THERAPY  Noted: 2020  Today's Date: 2020    VISIT#: 10    Subjective : Pt reports continued pain in L calf, varies from severe with activity to dull ache at rest. States she cannot sleep because of pain. Wore her barn boots for a while over the weekend and hurt the whole time. Walked a short distance on gravel and was very difficult and had severe pain afterwards. Tried wearing her Ugg boots (which are very soft) when out over the weekend and had to take them off when she got in the car due to pain from boot rubbing/touching her leg. Pt is very concerned that there is something wrong in her leg and ankle.       Objective : Added yellow Tband to ankle 4-way today and had pain and clicking (palpable) during inversion. Very tender to palpation with tightness palpable during gentle STM to gastroc and achilles.    See Exercise, Manual, and Modality Logs for complete treatment.     Assessment/Plan : Continued pain with poor tolerance to wearing shoes and poor tolerance to activity. May benefit from further assessment to rule out soft tissue injury or ankle injury in addition to rule out compartment syndrome. Pt will benefit from continued ankle ROM and strengthening as tolerated. Edema being managed well with daily use of compression stockings (15-20 mmHg, pt purchased out of pocket).    Progress per Plan of Care         Timed:         Manual Therapy:     15     mins  01815;     Therapeutic Exercise:    10     mins  74303;     Neuromuscular Mel:        mins  14617;    Therapeutic Activity:          mins  36898;     Gait Training:           mins  58534;     Ultrasound:     8     mins  59834;    Ionto                                   mins   79895  Self Care                            mins   53255  Canalith Repos                   mins  61061    Un-Timed:  Electrical Stimulation:         mins  14345 ( );  Dry Needling          mins self-pay  Traction          mins 54615  Low Eval          Mins  87177  Mod Eval          Mins  19272  High Eval                            Mins  57409  Re-Eval                               mins  33651    Timed Treatment:   33   mins   Total Treatment:     33   mins    May Christianson, PT  Physical Therapist

## 2020-12-09 ENCOUNTER — HOSPITAL ENCOUNTER (OUTPATIENT)
Dept: CARDIOLOGY | Facility: HOSPITAL | Age: 60
Discharge: HOME | End: 2020-12-09
Admitting: FAMILY MEDICINE

## 2020-12-09 DIAGNOSIS — I10 BENIGN HYPERTENSION: ICD-10-CM

## 2020-12-09 DIAGNOSIS — I10 UNCONTROLLED HYPERTENSION: ICD-10-CM

## 2020-12-09 LAB
BH CV ECHO MEAS - DIST REN A PSV LEFT: 99 CM/S
BH CV ECHO MEAS - MID REN A PSV LEFT: 111 CM/S
BH CV ECHO MEAS - PROX REN A PSV LEFT: 89 CM/S
BH CV VAS RENAL AORTIC MID PSV: 102 CM/S
BH CV XLRA MEAS - KID L LEFT: 10.2 CM
BH CV XLRA MEAS DIST REN A PSV RIGHT: 80 CM/S
BH CV XLRA MEAS KID L RIGHT: 10.3 CM
BH CV XLRA MEAS MID REN A PSV RIGHT: 85 CM/S
BH CV XLRA MEAS PROX REN A PSV RIGHT: 108 CM/S
BH CV XLRA MEAS RAR LEFT: 1.1
BH CV XLRA MEAS RAR RIGHT: 1.1

## 2020-12-09 PROCEDURE — 93975 VASCULAR STUDY: CPT

## 2020-12-10 RX ORDER — HYDRALAZINE HYDROCHLORIDE 25 MG/1
TABLET, FILM COATED ORAL
Qty: 90 TABLET | Refills: 0 | Status: SHIPPED | OUTPATIENT
Start: 2020-12-10 | End: 2021-01-04

## 2020-12-11 ENCOUNTER — APPOINTMENT (OUTPATIENT)
Dept: CARDIOLOGY | Facility: HOSPITAL | Age: 60
End: 2020-12-11

## 2020-12-12 LAB
ALBUMIN SERPL-MCNC: 3.9 G/DL (ref 3.8–4.8)
ALBUMIN/GLOB SERPL: 1.4 {RATIO} (ref 1.2–2.2)
ALP SERPL-CCNC: 63 IU/L (ref 39–117)
ALT SERPL-CCNC: 11 IU/L (ref 0–32)
AST SERPL-CCNC: 10 IU/L (ref 0–40)
BASOPHILS # BLD AUTO: 0 X10E3/UL (ref 0–0.2)
BASOPHILS NFR BLD AUTO: 1 %
BILIRUB SERPL-MCNC: 1.5 MG/DL (ref 0–1.2)
BUN SERPL-MCNC: 11 MG/DL (ref 8–27)
BUN/CREAT SERPL: 11 (ref 12–28)
CALCIUM SERPL-MCNC: 9.4 MG/DL (ref 8.7–10.3)
CHLORIDE SERPL-SCNC: 101 MMOL/L (ref 96–106)
CO2 SERPL-SCNC: 25 MMOL/L (ref 20–29)
CORTIS AM PEAK SERPL-MCNC: 12.3 UG/DL (ref 6.2–19.4)
CREAT SERPL-MCNC: 0.99 MG/DL (ref 0.57–1)
DOPAMINE 24H UR-MRATE: 222 UG/24 HR (ref 0–510)
DOPAMINE UR-MCNC: 148 UG/L
EOSINOPHIL # BLD AUTO: 0.3 X10E3/UL (ref 0–0.4)
EOSINOPHIL NFR BLD AUTO: 5 %
EPINEPH 24H UR-MRATE: 5 UG/24 HR (ref 0–20)
EPINEPH UR-MCNC: 3 UG/L
ERYTHROCYTE [DISTWIDTH] IN BLOOD BY AUTOMATED COUNT: 12.9 % (ref 11.7–15.4)
GLOBULIN SER CALC-MCNC: 2.8 G/DL (ref 1.5–4.5)
GLUCOSE SERPL-MCNC: 93 MG/DL (ref 65–99)
HCT VFR BLD AUTO: 40.7 % (ref 34–46.6)
HGB BLD-MCNC: 14.3 G/DL (ref 11.1–15.9)
IMM GRANULOCYTES # BLD AUTO: 0 X10E3/UL (ref 0–0.1)
IMM GRANULOCYTES NFR BLD AUTO: 0 %
LYMPHOCYTES # BLD AUTO: 1.6 X10E3/UL (ref 0.7–3.1)
LYMPHOCYTES NFR BLD AUTO: 32 %
MCH RBC QN AUTO: 31 PG (ref 26.6–33)
MCHC RBC AUTO-ENTMCNC: 35.1 G/DL (ref 31.5–35.7)
MCV RBC AUTO: 88 FL (ref 79–97)
MONOCYTES # BLD AUTO: 0.5 X10E3/UL (ref 0.1–0.9)
MONOCYTES NFR BLD AUTO: 11 %
NEUTROPHILS # BLD AUTO: 2.6 X10E3/UL (ref 1.4–7)
NEUTROPHILS NFR BLD AUTO: 51 %
NOREPINEPH 24H UR-MRATE: 77 UG/24 HR (ref 0–135)
NOREPINEPH UR-MCNC: 51 UG/L
PLATELET # BLD AUTO: 242 X10E3/UL (ref 150–450)
POTASSIUM SERPL-SCNC: 4 MMOL/L (ref 3.5–5.2)
PROT SERPL-MCNC: 6.7 G/DL (ref 6–8.5)
RBC # BLD AUTO: 4.62 X10E6/UL (ref 3.77–5.28)
SODIUM SERPL-SCNC: 138 MMOL/L (ref 134–144)
TSH SERPL DL<=0.005 MIU/L-ACNC: 3.29 UIU/ML (ref 0.45–4.5)
VMA 24H UR-MRATE: 5.6 MG/24 HR (ref 0–7.5)
VMA UR-MCNC: 3.7 MG/L
WBC # BLD AUTO: 4.9 X10E3/UL (ref 3.4–10.8)

## 2020-12-21 ENCOUNTER — TELEPHONE (OUTPATIENT)
Dept: FAMILY MEDICINE CLINIC | Facility: CLINIC | Age: 60
End: 2020-12-21

## 2020-12-21 DIAGNOSIS — I10 BENIGN HYPERTENSION: Primary | ICD-10-CM

## 2020-12-21 RX ORDER — LISINOPRIL AND HYDROCHLOROTHIAZIDE 25; 20 MG/1; MG/1
1 TABLET ORAL DAILY
Qty: 30 TABLET | Refills: 1 | Status: SHIPPED | OUTPATIENT
Start: 2020-12-21 | End: 2020-12-22 | Stop reason: SINTOL

## 2020-12-21 NOTE — TELEPHONE ENCOUNTER
Pt called. States her blood pressure has still been up.  Per Dr. Renteria stop the hctz 12.5 and start lisinopril-hctz 20-25 qd and continue the hydralazine 25mg tid.  Let us know in about a week how it's running.  Rx sent to [facility].

## 2020-12-22 ENCOUNTER — TELEPHONE (OUTPATIENT)
Dept: FAMILY MEDICINE CLINIC | Facility: CLINIC | Age: 60
End: 2020-12-22

## 2020-12-22 DIAGNOSIS — I10 UNCONTROLLED HYPERTENSION: Primary | ICD-10-CM

## 2020-12-22 DIAGNOSIS — I10 BENIGN HYPERTENSION: ICD-10-CM

## 2020-12-22 RX ORDER — LOSARTAN POTASSIUM AND HYDROCHLOROTHIAZIDE 12.5; 5 MG/1; MG/1
1 TABLET ORAL DAILY
Qty: 30 TABLET | Refills: 1 | Status: SHIPPED | OUTPATIENT
Start: 2020-12-22 | End: 2021-01-04 | Stop reason: SDUPTHER

## 2020-12-22 NOTE — TELEPHONE ENCOUNTER
Pt called.  States a new b/p med was sent in for her (Liniopril-HCTZ) but says she cannot take Lisinopril becauses it causes her to have bad coughing. Says she needs a different med.

## 2021-01-04 ENCOUNTER — TELEPHONE (OUTPATIENT)
Dept: FAMILY MEDICINE CLINIC | Facility: CLINIC | Age: 61
End: 2021-01-04

## 2021-01-04 ENCOUNTER — TREATMENT (OUTPATIENT)
Dept: PHYSICAL THERAPY | Facility: CLINIC | Age: 61
End: 2021-01-04

## 2021-01-04 DIAGNOSIS — M25.572 ACUTE LEFT ANKLE PAIN: Primary | ICD-10-CM

## 2021-01-04 DIAGNOSIS — I10 UNCONTROLLED HYPERTENSION: ICD-10-CM

## 2021-01-04 DIAGNOSIS — I89.0 LYMPHEDEMA OF LEFT LOWER EXTREMITY: ICD-10-CM

## 2021-01-04 DIAGNOSIS — M79.662 PAIN OF LEFT LOWER LEG: ICD-10-CM

## 2021-01-04 PROCEDURE — 97110 THERAPEUTIC EXERCISES: CPT | Performed by: PHYSICAL THERAPIST

## 2021-01-04 PROCEDURE — DRYNDL PR CUSTOM DRY NEEDLING SELF PAY: Performed by: PHYSICAL THERAPIST

## 2021-01-04 PROCEDURE — 97140 MANUAL THERAPY 1/> REGIONS: CPT | Performed by: PHYSICAL THERAPIST

## 2021-01-04 RX ORDER — LOSARTAN POTASSIUM AND HYDROCHLOROTHIAZIDE 12.5; 5 MG/1; MG/1
1 TABLET ORAL DAILY
Qty: 90 TABLET | Refills: 1 | Status: SHIPPED | OUTPATIENT
Start: 2021-01-04 | End: 2021-07-07 | Stop reason: SDUPTHER

## 2021-01-04 NOTE — TELEPHONE ENCOUNTER
Pt called.  States she stopped taking the hydralazine and is only taking the losartan-hctz and says she feels a lot better and her b/p and under control. She will just keep taking only the losartan-hctz and needs a refill on it. Rx sent.

## 2021-01-04 NOTE — PROGRESS NOTES
Physical Therapy Daily Progress Note      Patient: Irma Buchanan   : 1960  Diagnosis/ICD-10 Code:  Acute left ankle pain [M25.572]   Problems Addressed this Visit     None      Visit Diagnoses     Acute left ankle pain    -  Primary    Pain of left lower leg        Lymphedema of left lower extremity          Diagnoses       Codes Comments    Acute left ankle pain    -  Primary ICD-10-CM: M25.572  ICD-9-CM: 719.47     Pain of left lower leg     ICD-10-CM: M79.662  ICD-9-CM: 729.5     Lymphedema of left lower extremity     ICD-10-CM: I89.0  ICD-9-CM: 457.1         Referring practitioner: KARIS Arellano  Date of Initial Visit: Type: THERAPY  Noted: 2020  Today's Date: 2021    VISIT#: 11    Subjective : Pt unable to attend PT for several weeks due to elevated BP, was then exposed to COVID-19, and then tested positive for COVID-19. Pt states she is not having any swelling in L LE. Still having pain in her calf and shooting pains at times. States she had a sharp pain in her leg last week and then leg was numb for a short time and then severe pain for 1-2 hours while sitting. 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 Difficulty walking on uneven surfaces. States she tolerated wearing a certain pain of boots at home for 1 hr and had to get them off. Pt states that financially she needs to get back to work but does not know how she will do it. Pt in agreement to trying dry needling today, states she is willing to try anything.      Objective : SLS R = 7 sec, L = 0 sec  L ankle strength = 4/5 in all directions.  Performed dry needling with written consent to the following areas: homeostatic points = saphenous, common fibular, tibial, deep fibular, and sural; symptomatic points of lateral gastroc.  See Exercise, Manual, and Modality Logs for complete treatment. Reviewed HEP, Encouraged pt to use B UE support when standing on L LE to perform R LE  ther ex to and gradually decrease UE support to improve wt bearing tolerance and stability of L ankle. Pt verbalized understanding.    Assessment/Plan : Tenderness to palpation gastroc (lateral aspect> medial). Continued difficulty on uneven surfaces per pt report. Poor SLS ability on L. Pt is compliant with HEP.  Good tolerance to dry needling this date. 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    Progress per Plan of Care         Timed:         Manual Therapy:    10     mins  02463;     Therapeutic Exercise:    10     mins  05437;     Neuromuscular Mel:        mins  28211;    Therapeutic Activity:          mins  90076;     Gait Training:           mins  41546;     Ultrasound:          mins  74630;    Ionto                                   mins   12746  Self Care                            mins   03624  Canalith Repos                   mins  42867    Un-Timed:  Electrical Stimulation:         mins  83844 ( );  Dry Needling     20     mins self-pay  Traction          mins 16808  Low Eval          Mins  92945  Mod Eval          Mins  64074  High Eval                            Mins  68759  Re-Eval                               mins  63529    Timed Treatment:   20   mins   Total Treatment:     40   mins    May Christianson, PT  Physical Therapist

## 2021-01-07 ENCOUNTER — TRANSCRIBE ORDERS (OUTPATIENT)
Dept: PHYSICAL THERAPY | Facility: CLINIC | Age: 61
End: 2021-01-07

## 2021-01-07 ENCOUNTER — TREATMENT (OUTPATIENT)
Dept: PHYSICAL THERAPY | Facility: CLINIC | Age: 61
End: 2021-01-07

## 2021-01-07 DIAGNOSIS — M79.662 PAIN OF LEFT LOWER LEG: ICD-10-CM

## 2021-01-07 DIAGNOSIS — M79.672 LEFT FOOT PAIN: Primary | ICD-10-CM

## 2021-01-07 DIAGNOSIS — M25.572 ACUTE LEFT ANKLE PAIN: Primary | ICD-10-CM

## 2021-01-07 PROCEDURE — DRYNDL PR CUSTOM DRY NEEDLING SELF PAY: Performed by: PHYSICAL THERAPIST

## 2021-01-07 PROCEDURE — 97140 MANUAL THERAPY 1/> REGIONS: CPT | Performed by: PHYSICAL THERAPIST

## 2021-01-07 NOTE — PROGRESS NOTES
Physical Therapy Daily Progress Note      Patient: Irma Buchanan   : 1960  Diagnosis/ICD-10 Code:  Acute left ankle pain [M25.572]   Problems Addressed this Visit     None      Visit Diagnoses     Acute left ankle pain    -  Primary    Pain of left lower leg          Diagnoses       Codes Comments    Acute left ankle pain    -  Primary ICD-10-CM: M25.572  ICD-9-CM: 719.47     Pain of left lower leg     ICD-10-CM: M79.662  ICD-9-CM: 729.5         Referring practitioner: KARIS Arellano  Date of Initial Visit: Type: THERAPY  Noted: 2020  Today's Date: 2021    VISIT#: 12    Subjective : Pt states she is feeling better since dry needling. Saw work comp MD and he said to continue with PT for dry needling and deep massage and for pt to use heat at home. States she got in her jet tub and massage her leg and it felt better. Returns in 2 weeks.      Objective : Performed dry needling with written consent to the following areas: homeostatic points = common fibular, tibial, and sural; symptomatic points of lateral gastroc/peroneal and ant-lateral aspect of L ankle.    See Exercise, Manual, and Modality Logs for complete treatment.     Assessment/Plan : Decreased pain at start of session. Good tolerance to dry needling and deep massage to gastroc and peroneal. Pt to perform ther ex at home. Will benefit from continued PT for dry needling, deep STM, and progression of ther ex.    Progress per Plan of Care         Timed:         Manual Therapy:    15     mins  47805;     Therapeutic Exercise:         mins  42893;     Neuromuscular Mel:        mins  57711;    Therapeutic Activity:          mins  98537;     Gait Training:           mins  45293;     Ultrasound:          mins  93956;    Ionto                                   mins   92585  Self Care                            mins   25497  Canalith Repos                   mins  76857    Un-Timed:  Electrical Stimulation:         mins  00424 ( );  Dry  Needling     15     mins self-pay  Traction          mins 34033  Low Eval          Mins  34483  Mod Eval          Mins  77848  High Eval                            Mins  40652  Re-Eval                               mins  83796    Timed Treatment:   15   mins   Total Treatment:     30   mins    May Christianson, PT  Physical Therapist

## 2021-01-11 ENCOUNTER — TREATMENT (OUTPATIENT)
Dept: PHYSICAL THERAPY | Facility: CLINIC | Age: 61
End: 2021-01-11

## 2021-01-11 DIAGNOSIS — M79.662 PAIN OF LEFT LOWER LEG: ICD-10-CM

## 2021-01-11 DIAGNOSIS — M25.572 ACUTE LEFT ANKLE PAIN: Primary | ICD-10-CM

## 2021-01-11 PROCEDURE — DRYNDL PR CUSTOM DRY NEEDLING SELF PAY: Performed by: PHYSICAL THERAPIST

## 2021-01-11 PROCEDURE — 97110 THERAPEUTIC EXERCISES: CPT | Performed by: PHYSICAL THERAPIST

## 2021-01-11 NOTE — PROGRESS NOTES
Physical Therapy Daily Progress Note      Patient: Irma Buchanan   : 1960  Diagnosis/ICD-10 Code:  Acute left ankle pain [M25.572]   Problems Addressed this Visit     None      Visit Diagnoses     Acute left ankle pain    -  Primary    Pain of left lower leg          Diagnoses       Codes Comments    Acute left ankle pain    -  Primary ICD-10-CM: M25.572  ICD-9-CM: 719.47     Pain of left lower leg     ICD-10-CM: M79.662  ICD-9-CM: 729.5         Referring practitioner: KARIS Arellano  Date of Initial Visit: Type: THERAPY  Noted: 2020  Today's Date: 2021    VISIT#: 13    Subjective : Pt states she cannot believe how much the dry needling has helped her leg. States she was able to work outside for several hours chopping wood yesterday with no increase in pain and no difficulty on the uneven surfaces. States she had some mild soreness of the inside of her calf last night but not pain. Reports that she went through her shoes and got rid of all that were broken down or not comfortable. Needs to find some shoes to wear.       Objective : Performed dry needling with written consent to the following areas: homeostatic points = common fibular, tibial, and sural; symptomatic points of lateral gastroc/peroneal and ant-lateral aspect of L ankle.  Issued red Tband for ankle 4-way.  See Exercise, Manual, and Modality Logs for complete treatment.     Assessment/Plan : Decreased pain with activity. Decreased tenderness lateral aspect of gastroc. Great response to dry needling and deep STM. Will benefit from continued dry needling and deep STM to further decrease pain. Will progress ther ex as tolerated to further improved L ankle strength and stability.    Progress per Plan of Care and Progress strengthening /stabilization /functional activity         Timed:         Manual Therapy:    15     mins  78331;     Therapeutic Exercise:         mins  99300;     Neuromuscular Mel:        mins  88925;     Therapeutic Activity:          mins  72400;     Gait Training:           mins  95358;     Ultrasound:          mins  57142;    Ionto                                   mins   83869  Self Care                            mins   97626  Canalith Repos                   mins  65913    Un-Timed:  Electrical Stimulation:         mins  82535 ( );  Dry Needling     15     mins self-pay  Traction          mins 42988  Low Eval          Mins  04144  Mod Eval          Mins  72758  High Eval                            Mins  35896  Re-Eval                               mins  12196    Timed Treatment:   15   mins   Total Treatment:     30   mins    May Christianson, PT  Physical Therapist

## 2021-01-13 ENCOUNTER — TREATMENT (OUTPATIENT)
Dept: PHYSICAL THERAPY | Facility: CLINIC | Age: 61
End: 2021-01-13
Payer: OTHER MISCELLANEOUS

## 2021-01-13 ENCOUNTER — TREATMENT (OUTPATIENT)
Dept: PHYSICAL THERAPY | Facility: CLINIC | Age: 61
End: 2021-01-13

## 2021-01-13 DIAGNOSIS — M54.2 CERVICAL PAIN: Primary | ICD-10-CM

## 2021-01-13 PROCEDURE — 97140 MANUAL THERAPY 1/> REGIONS: CPT | Performed by: PHYSICAL THERAPIST

## 2021-01-13 PROCEDURE — 97161 PT EVAL LOW COMPLEX 20 MIN: CPT | Performed by: PHYSICAL THERAPIST

## 2021-01-13 PROCEDURE — 97014 ELECTRIC STIMULATION THERAPY: CPT | Performed by: PHYSICAL THERAPIST

## 2021-01-13 NOTE — PROGRESS NOTES
Physical Therapy Initial Evaluation and Plan of Care    Patient: Irma Buchanan   : 1960  Diagnosis/ICD-10 Code:  Cervical pain [M54.2]  Referring practitioner: No ref. provider found  Date of Initial Visit: 2021  Today's Date: 2021  Patient seen for 1 sessions           Subjective Questionnaire: NDI: 24% impairment      Subjective Evaluation    History of Present Illness  Mechanism of injury: Pt with c/o R sided neck pain x6 months. Pain came on gradually, no injury or accident. States pain seems to start at base of neck and radiates up to her head and down to her shoulder. At times radiates down side of arm to R elbow. States her muscle feels tight from neck to shoulder and can not get it to release. Heat decreases pain. Difficulty sleeping and neck pain wakes her frequently. Pt reports hx of R RCR, denies any shoulder pain or weakness. Denies any numbness or tingling in UEs. Having difficulty turning her head, especially to the L. Increased pain with looking down to read.      Patient Occupation:  Quality of life: good    Pain  Current pain ratin  At best pain ratin  At worst pain ratin  Location: R side of neck and out to R shoulder  Quality: dull ache and tight  Relieving factors: heat  Aggravating factors: keyboarding, lifting and sleeping    Hand dominance: right    Patient Goals  Patient goals for therapy: decreased pain and increased motion  Patient goal: sleep better           Objective          Postural Observations  Seated posture: fair    Additional Postural Observation Details  Decreased cervical lordosis noted. Overall good posture of shoulders and thoracic spine.    Palpation     Right   Muscle spasm in the cervical paraspinals and suboccipitals. Tenderness of the cervical paraspinals, suboccipitals and upper trapezius.     Tenderness   Cervical Spine   Tenderness in the facet joint and spinous process.     Active Range of Motion     Additional Active Range  of Motion Details  Mild limitation in flexion and R rotation. Moderate limitation in ext L rotation and B lateral flexion.    Strength/Myotome Testing   Cervical Spine     Left   Normal strength    Right   Normal strength    Tests   Cervical     Left   Positive cervical distraction.     Right   Positive cervical distraction.     Cervical Flexibility Comments:   Tightness R UT and suboccipitals.          Assessment & Plan     Assessment  Impairments: abnormal muscle firing, abnormal muscle tone, abnormal or restricted ROM, activity intolerance, impaired physical strength, lacks appropriate home exercise program and pain with function  Assessment details: Pt is 59 yo female with 6 mo hx of R sided neck pain. Pt presents with decreased ROM of neck and tightness of neck musculature. Pt is having difficulty turning her head. Increased pain with reading and looking down. Difficulty sleeping. NDI indicates 24% impairment.    Patient presents with the impairments listed above and based on the objective findings and the physical therapy evaluation, the patient’s condition has the potential to improve in response to therapy.   The patient’s condition and/or services required are at a level of complexity that necessitates the skill & supervision of a physical therapist.    Prognosis: good  Functional Limitations: carrying objects, lifting, sleeping, pulling, pushing, uncomfortable because of pain, moving in bed and unable to perform repetitive tasks  Goals  Plan Goals: STG:  - Pt to report 50% improvement in R UE radiation pain in 3 weeks.  - Pt to demonstrated improved posture with min verbal cues in 3 weeks.  - Pt to be independent with HEP in 3 weeks.  LTG:  - Improve NDI to 15% or less impairment by discharge.  - Pt to report no difficulty turning her head while driving by discharge.  - Pt to report no pain with light activity by discharge.  - Pt to report 75% improvement in sleep by discharge.    Plan  Therapy options:  will be seen for skilled physical therapy services  Planned modality interventions: thermotherapy (hydrocollator packs) and electrical stimulation/Russian stimulation  Other planned modality interventions: modalities as indicated  Planned therapy interventions: body mechanics training, flexibility, home exercise program, functional ROM exercises, manual therapy, postural training, soft tissue mobilization, strengthening, stretching and therapeutic activities  Frequency: 2x week  Duration in visits: 12  Treatment plan discussed with: patient        Timed:         Manual Therapy:    15     mins  28746;     Therapeutic Exercise:         mins  97964;     Neuromuscular Mel:        mins  03137;    Therapeutic Activity:          mins  42543;     Gait Training:           mins  71831;     Ultrasound:          mins  04105;    Ionto                                   mins   76087  Can Repos          mins 93339    Un-Timed:  Electrical Stimulation:    15     mins  76960 ( );  Dry Needling          mins self-pay  Traction          mins 38579  Low Eval     15     Mins  42506  Mod Eval          Mins  35992  High Eval                            Mins  35194  Self - Care                          mins  40040        Timed Treatment:   30   mins   Total Treatment:     45   mins    PT SIGNATURE: May Christianson, PT   DATE TREATMENT INITIATED: 1/13/2021    Initial Certification  Certification Period: 4/13/2021  I certify that the therapy services are furnished while this patient is under my care.  The services outlined above are required by this patient, and will be reviewed every 90 days.     PHYSICIAN:       DATE:     Please sign and return via fax to [phone].. Thank you, [facility].

## 2021-01-15 ENCOUNTER — TREATMENT (OUTPATIENT)
Dept: PHYSICAL THERAPY | Facility: CLINIC | Age: 61
End: 2021-01-15

## 2021-01-15 DIAGNOSIS — M79.662 PAIN OF LEFT LOWER LEG: ICD-10-CM

## 2021-01-15 DIAGNOSIS — M25.572 ACUTE LEFT ANKLE PAIN: Primary | ICD-10-CM

## 2021-01-15 PROCEDURE — DRYNDL PR CUSTOM DRY NEEDLING SELF PAY: Performed by: PHYSICAL THERAPIST

## 2021-01-15 PROCEDURE — 97140 MANUAL THERAPY 1/> REGIONS: CPT | Performed by: PHYSICAL THERAPIST

## 2021-01-15 NOTE — PROGRESS NOTES
Physical Therapy Daily Progress Note      Patient: Irma Buchanan   : 1960  Diagnosis/ICD-10 Code:  Acute left ankle pain [M25.572]   Problems Addressed this Visit     None      Visit Diagnoses     Acute left ankle pain    -  Primary    Pain of left lower leg          Diagnoses       Codes Comments    Acute left ankle pain    -  Primary ICD-10-CM: M25.572  ICD-9-CM: 719.47     Pain of left lower leg     ICD-10-CM: M79.662  ICD-9-CM: 729.5         Referring practitioner: KARIS Arellano  Date of Initial Visit: Type: THERAPY  Noted: 2020  Today's Date: 1/15/2021    VISIT#: 14    Subjective : Pt states she worked outside at home yesterday for about 4 hours in her rubber boots with inserts and had no pain while working. Then had severe pain in the evening and overnight and rated at 9/10. Current pain is 6/10 and runs down side of lower leg and across front of ankle.       Objective : Performed dry needling with written consent to the following areas: homeostatic points = common fibular, tibial, distal fibular; symptomatic points of lateral gastroc/peroneal and ant-lateral aspect of L ankle. Encouraged pt to try doing some work around home with her steel-toe boots on to see how her ankle and leg will respond to that. Instructed pt that her ankle may feel better with support of lace-up boots vs slip on rubber boots. Pt verbalized understanding.  L ankle strength 5/5 in all directions. AROM WNL in all directions.    See Exercise, Manual, and Modality Logs for complete treatment.     Assessment/Plan : Increased pain after doing work outside but was painfree during work. Decreased pain and improved tolerance to activity with use of dry needling to L lower leg. Will benefit from continued L ankle stability and proprioception.    Progress per Plan of Care         Timed:         Manual Therapy:    15     mins  17922;     Therapeutic Exercise:         mins  23982;     Neuromuscular Mel:        mins  29153;     Therapeutic Activity:          mins  14384;     Gait Training:           mins  10006;     Ultrasound:          mins  55962;    Ionto                                   mins   98307  Self Care                            mins   59239  Canalith Repos                   mins  22979    Un-Timed:  Electrical Stimulation:         mins  99765 ( );  Dry Needling     15     mins self-pay  Traction          mins 05206  Low Eval          Mins  91069  Mod Eval          Mins  69865  High Eval                            Mins  81229  Re-Eval                               mins  03983    Timed Treatment:   15   mins   Total Treatment:     30   mins    May Christianson, PT  Physical Therapist

## 2021-01-18 ENCOUNTER — TREATMENT (OUTPATIENT)
Dept: PHYSICAL THERAPY | Facility: CLINIC | Age: 61
End: 2021-01-18

## 2021-01-18 DIAGNOSIS — M25.572 ACUTE LEFT ANKLE PAIN: Primary | ICD-10-CM

## 2021-01-18 DIAGNOSIS — M79.662 PAIN OF LEFT LOWER LEG: ICD-10-CM

## 2021-01-18 PROCEDURE — 97140 MANUAL THERAPY 1/> REGIONS: CPT | Performed by: PHYSICAL THERAPIST

## 2021-01-18 PROCEDURE — 97110 THERAPEUTIC EXERCISES: CPT | Performed by: PHYSICAL THERAPIST

## 2021-01-18 NOTE — PROGRESS NOTES
Physical Therapy Daily Progress Note      Patient: Irma Buchanan   : 1960  Diagnosis/ICD-10 Code:  Acute left ankle pain [M25.572]   Problems Addressed this Visit     None      Visit Diagnoses     Acute left ankle pain    -  Primary    Pain of left lower leg          Diagnoses       Codes Comments    Acute left ankle pain    -  Primary ICD-10-CM: M25.572  ICD-9-CM: 719.47     Pain of left lower leg     ICD-10-CM: M79.662  ICD-9-CM: 729.5         Referring practitioner: KARIS Arellano  Date of Initial Visit: Type: THERAPY  Noted: 2020  Today's Date: 2021    VISIT#: 15    Subjective : States she drove back to the barn on Fri and when she stepped out of her [id number], she had a severe pain pain in her ankle and fell. Fell on her R hip and hit her chest. Has some mild pain Sat morning and then pain increased Sat evening and through Sun. Having no pain today. States she has no stability in her ankle and is worried about that. Wants to hold on dry needling today. Has been HEP daily.      Objective : Increased resistance to green Tband for ankle 4-way. Fatigued during PF.  L ankle DF and Inv strength 5/5, PF and Ev = 4+/5. AROM WNL in all directions.  See Exercise, Manual, and Modality Logs for complete treatment.   Tightness and increased tension noted along L peroneal muscle, especially proximally.    Assessment/Plan : Fall sustained since last visit. Decreased pain at start of session. Decreased strength noted in L ankle PF and eversion. Pt with impaired L ankle stability. SLS challenging. Needs continued L ankle strengthening to improved ankle stability and proprioception.    Progress per Plan of Care pending MD follow-up.         Timed:         Manual Therapy:    15     mins  74483;     Therapeutic Exercise:    15     mins  75787;     Neuromuscular Mel:        mins  10096;    Therapeutic Activity:          mins  92192;     Gait Training:           mins  84829;     Ultrasound:           mins  24985;    Ionto                                   mins   21843  Self Care                            mins   98474  Canalith Repos                   mins  27647    Un-Timed:  Electrical Stimulation:         mins  56521 ( );  Dry Needling          mins self-pay  Traction          mins 01977  Low Eval          Mins  60876  Mod Eval          Mins  98385  High Eval                            Mins  44375  Re-Eval                               mins  64812    Timed Treatment:   30   mins   Total Treatment:     30   mins    May Christianson PT  Physical Therapist

## 2021-01-20 ENCOUNTER — TREATMENT (OUTPATIENT)
Dept: PHYSICAL THERAPY | Facility: CLINIC | Age: 61
End: 2021-01-20

## 2021-01-20 DIAGNOSIS — M79.662 PAIN OF LEFT LOWER LEG: ICD-10-CM

## 2021-01-20 DIAGNOSIS — M25.572 ACUTE LEFT ANKLE PAIN: Primary | ICD-10-CM

## 2021-01-20 DIAGNOSIS — M54.2 CERVICAL PAIN: Primary | ICD-10-CM

## 2021-01-20 PROCEDURE — 97110 THERAPEUTIC EXERCISES: CPT | Performed by: PHYSICAL THERAPIST

## 2021-01-20 PROCEDURE — 97140 MANUAL THERAPY 1/> REGIONS: CPT | Performed by: PHYSICAL THERAPIST

## 2021-01-20 PROCEDURE — 97014 ELECTRIC STIMULATION THERAPY: CPT | Performed by: PHYSICAL THERAPIST

## 2021-01-20 PROCEDURE — 97112 NEUROMUSCULAR REEDUCATION: CPT | Performed by: PHYSICAL THERAPIST

## 2021-01-20 NOTE — PROGRESS NOTES
Physical Therapy Daily Progress Note      Patient: Irma Buchanan   : 1960  Diagnosis/ICD-10 Code:  Cervical pain [M54.2]   Problems Addressed this Visit     None      Visit Diagnoses     Cervical pain    -  Primary      Diagnoses       Codes Comments    Cervical pain    -  Primary ICD-10-CM: M54.2  ICD-9-CM: 723.1         Referring practitioner: Self Referring  Date of Initial Visit: Type: THERAPY  Noted: 2021  Today's Date: 2021    VISIT#: 2    Subjective : Pt reports that her neck is starting to feel better and she has slept much better the past 2 nights.      Objective : Added prone thoracic ext with cervical rotation into rest and mid rows/low rows.    See Exercise, Manual, and Modality Logs for complete treatment.     Assessment/Plan : Decreased pain at start of session. Good tolerance to progression of ther ex. Will benefit from continued manual techniques and modalities to further decrease neck pain and improve strength and posture.    Progress per Plan of Care         Timed:         Manual Therapy:    15     mins  75335;     Therapeutic Exercise:    15     mins  11781;     Neuromuscular Mel:        mins  54106;    Therapeutic Activity:          mins  86149;     Gait Training:           mins  50286;     Ultrasound:          mins  48620;    Ionto                                   mins   34724  Self Care                            mins   53800  Canalith Repos                   mins  41024    Un-Timed:  Electrical Stimulation:    15     mins  90459 ( );  Dry Needling          mins self-pay  Traction          mins 49619  Low Eval          Mins  11258  Mod Eval          Mins  53534  High Eval                            Mins  61505  Re-Eval                               mins  71974    Timed Treatment:   30   mins   Total Treatment:     45   mins    May Christianson, PT  Physical Therapist

## 2021-01-20 NOTE — PROGRESS NOTES
Physical Therapy Daily Progress Note      Patient: Irma Buchanan   : 1960  Diagnosis/ICD-10 Code:  Acute left ankle pain [M25.572]   Problems Addressed this Visit     None      Visit Diagnoses     Acute left ankle pain    -  Primary    Pain of left lower leg          Diagnoses       Codes Comments    Acute left ankle pain    -  Primary ICD-10-CM: M25.572  ICD-9-CM: 719.47     Pain of left lower leg     ICD-10-CM: M79.662  ICD-9-CM: 729.5         Referring practitioner: KARIS Arellano  Date of Initial Visit: Type: THERAPY  Noted: 2020  Today's Date: 2021    VISIT#: 16    Subjective : Pt reports her leg is feeling good. Got some new tennis shoes and states that they feel amazing.      Objective : Added several balance and proprioceptive activities this date.    See Exercise, Manual, and Modality Logs for complete treatment.     Assessment/Plan : Decreased pain at start of session. Good tolerance to ther ex progression. Proprioceptive activities very challenging. Needs continued strengthening and stability of L ankle along with proprioception training to improve ability to ambulate on uneven surfaces.    Progress per Plan of Care and Progress strengthening /stabilization /functional activity         Timed:         Manual Therapy:    15     mins  99455;     Therapeutic Exercise:    10     mins  17402;     Neuromuscular Mel:    15    mins  37510;    Therapeutic Activity:          mins  38697;     Gait Training:           mins  66841;     Ultrasound:          mins  66462;    Ionto                                   mins   03950  Self Care                            mins   52473  Canalith Repos                   mins  16945    Un-Timed:  Electrical Stimulation:         mins  94683 ( );  Dry Needling          mins self-pay  Traction          mins 30781  Low Eval          Mins  64026  Mod Eval          Mins  18250  High Eval                            Mins  79497  Re-Eval                                mins  09259    Timed Treatment:   40   mins   Total Treatment:     40   mins    May Christianson, PT  Physical Therapist

## 2021-01-26 ENCOUNTER — TREATMENT (OUTPATIENT)
Dept: PHYSICAL THERAPY | Facility: CLINIC | Age: 61
End: 2021-01-26

## 2021-01-26 DIAGNOSIS — M79.662 PAIN OF LEFT LOWER LEG: ICD-10-CM

## 2021-01-26 DIAGNOSIS — M25.572 ACUTE LEFT ANKLE PAIN: Primary | ICD-10-CM

## 2021-01-26 PROCEDURE — 97110 THERAPEUTIC EXERCISES: CPT | Performed by: PHYSICAL THERAPIST

## 2021-01-26 PROCEDURE — 97140 MANUAL THERAPY 1/> REGIONS: CPT | Performed by: PHYSICAL THERAPIST

## 2021-01-26 PROCEDURE — 97112 NEUROMUSCULAR REEDUCATION: CPT | Performed by: PHYSICAL THERAPIST

## 2021-01-26 NOTE — PROGRESS NOTES
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 States her leg is sore today but not severe pain. States her ankle feels unstable. States she was walking in her kitchen last night and her L ankle gave out and she fell. Had her tennis shoes on. States she had no warning. Reports pain at front of ankle at times and no pain at others.      Objective : crepitus noted at anterior-lateral aspect during resisted PF. Tenderness along extensor digitorum tendons.  Pt declined dry needling today due to doing well without it and pain has been manageable.  See Exercise, Manual, and Modality Logs for complete treatment.     Assessment/Plan : Decreased pain at start of session. Good tolerance to ther ex. SLS challenging. Pt needs continued L ankle strengthening and proprioception training ot increase ankle stability for safe return to work and decreased fall risk.    Progress per Plan of Care and Progress strengthening /stabilization /functional activity         Timed:         Manual Therapy:    15     mins  84903;     Therapeutic Exercise:    25     mins  69174;     Neuromuscular Mel:    15    mins  76788;    Therapeutic Activity:          mins  44454;     Gait Training:           mins  94287;     Ultrasound:          mins  11087;    Ionto                                    mins   98040  Self Care                            mins   89195  Canalith Repos                   mins  85769    Un-Timed:  Electrical Stimulation:         mins  80815 ( );  Dry Needling          mins self-pay  Traction          mins 53379  Low Eval          Mins  90347  Mod Eval          Mins  65179  High Eval                            Mins  44367  Re-Eval                               mins  61194    Timed Treatment:   55   mins   Total Treatment:     55   mins    May Christianson, PT  Physical Therapist

## 2021-02-01 ENCOUNTER — TREATMENT (OUTPATIENT)
Dept: PHYSICAL THERAPY | Facility: CLINIC | Age: 61
End: 2021-02-01

## 2021-02-01 DIAGNOSIS — M25.572 ACUTE LEFT ANKLE PAIN: Primary | ICD-10-CM

## 2021-02-01 DIAGNOSIS — M79.662 PAIN OF LEFT LOWER LEG: ICD-10-CM

## 2021-02-01 PROCEDURE — DRYNDL PR CUSTOM DRY NEEDLING SELF PAY: Performed by: PHYSICAL THERAPIST

## 2021-02-01 PROCEDURE — 97140 MANUAL THERAPY 1/> REGIONS: CPT | Performed by: PHYSICAL THERAPIST

## 2021-02-01 NOTE — PROGRESS NOTES
Physical Therapy Daily Progress Note      Patient: Irma Buchanan   : 1960  Diagnosis/ICD-10 Code:  Acute left ankle pain [M25.572]   Problems Addressed this Visit     None      Visit Diagnoses     Acute left ankle pain    -  Primary    Pain of left lower leg          Diagnoses       Codes Comments    Acute left ankle pain    -  Primary ICD-10-CM: M25.572  ICD-9-CM: 719.47     Pain of left lower leg     ICD-10-CM: M79.662  ICD-9-CM: 729.5         Referring practitioner: KARIS Arellano  Date of Initial Visit: Type: THERAPY  Noted: 2020  Today's Date: 2021    VISIT#: 18    Subjective : pt reports that her leg has been sore and progressively worsening since last visit. Has been doing stretching daily. 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 She was on concrete the whole time with leg hurting. Has been wearing her tennis shoes but has not attempted to put on work boots due to pain. Has done heat and ice since last visit with no significant relief. Ice seemed to make leg ache more. Sees MD tomorrow. States she is very discouraged because she has been feeling so much better until last week. Still feels very unstable on uneven surfaces.    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 States her leg is sore today but not severe pain. States her ankle feels unstable. States she was walking in her kitchen last night and her L ankle gave out and she fell. Had her tennis shoes on. States she had no warning. Reports pain at front of ankle at times and no pain at others.    Objective : tender to palpation L gastroc, medial soleus, peroneal muscle belly, and anterior-lateral aspect of L ankle.  Performed dry needling with written consent to the following areas: homeostatic points = common fibular, tibial, distal fibular, sural; symptomatic points of lateral gastroc/peroneal, L medial soleus, and  ant-lateral aspect of L ankle.  See Exercise, Manual, and Modality Logs for complete treatment.     Assessment/Plan : Increased pain and progressing worsening since last visit. Dry needling done today for first time in ~2 wks due to pt having decreased pain. Decreased L ankle proprioception. Will benefit from continued L ankle strengthening and progression of proprioceptive activities to improve functional activity tolerance and ability to ambulate on unable surfaces.    Progress per Plan of Care and Progress strengthening /stabilization /functional activity         Timed:         Manual Therapy:    15     mins  27220;     Therapeutic Exercise:         mins  30508;     Neuromuscular Mel:        mins  40275;    Therapeutic Activity:          mins  26355;     Gait Training:           mins  78659;     Ultrasound:          mins  95696;    Ionto                                   mins   44522  Self Care                            mins   95858  Canalith Repos                   mins  61107    Un-Timed:  Electrical Stimulation:         mins  63830 ( );  Dry Needling     30     mins self-pay  Traction          mins 23567  Low Eval          Mins  04344  Mod Eval          Mins  27353  High Eval                            Mins  02082  Re-Eval                               mins  24356    Timed Treatment:   15   mins   Total Treatment:     45   mins    May Christianson, PT  Physical Therapist

## 2021-02-03 ENCOUNTER — TREATMENT (OUTPATIENT)
Dept: PHYSICAL THERAPY | Facility: CLINIC | Age: 61
End: 2021-02-03

## 2021-02-03 DIAGNOSIS — M79.662 PAIN OF LEFT LOWER LEG: ICD-10-CM

## 2021-02-03 DIAGNOSIS — M25.572 ACUTE LEFT ANKLE PAIN: Primary | ICD-10-CM

## 2021-02-03 PROCEDURE — 97140 MANUAL THERAPY 1/> REGIONS: CPT | Performed by: PHYSICAL THERAPIST

## 2021-02-03 PROCEDURE — 97110 THERAPEUTIC EXERCISES: CPT | Performed by: PHYSICAL THERAPIST

## 2021-02-03 PROCEDURE — 97014 ELECTRIC STIMULATION THERAPY: CPT | Performed by: PHYSICAL THERAPIST

## 2021-02-03 NOTE — PROGRESS NOTES
Physical Therapy Daily Progress Note      Patient: Irma Buchanan   : 1960  Diagnosis/ICD-10 Code:  Acute left ankle pain [M25.572]   Problems Addressed this Visit     None      Visit Diagnoses     Acute left ankle pain    -  Primary    Pain of left lower leg          Diagnoses       Codes Comments    Acute left ankle pain    -  Primary ICD-10-CM: M25.572  ICD-9-CM: 719.47     Pain of left lower leg     ICD-10-CM: M79.662  ICD-9-CM: 729.5         Referring practitioner: KARIS Arellano  Date of Initial Visit: Type: THERAPY  Noted: 2020  Today's Date: 2/3/2021    VISIT#: 19    Subjective : Pt reports appt went well with worker's comp doctor and does not have to go back. States he is referring her to orthopedic MD. Received a letter from company that she is no longer an active employee and is still waiting on COBRA insurance information.  Was still sore after last visit with dry needling. Went out to her barn, walking on gravel, and was almost in tears when she got back to her house. States she was really sore and in a lot of pain yesterday at MD appt. Is some better today but does not want to do dry needling. She is trying to figure out her activity level and what sets pain off. Having sharp pain up both sides of legs, inside was going just above knee and outside is going all the way up to her hip.    Objective : Did not attempt all ther ex due to exacerbation of pain with increased activity.  Added e-stim to L peroneal and anterior ankle for pain management.  See Exercise, Manual, and Modality Logs for complete treatment.     Assessment/Plan : Decreased pain at start of session. SLS and balance activities challenging. Needs continued work on ankle stability and proprioception to decrease fall risk and improve tolerance to work and ambulation on uneven surfaces. F/u regarding ortho appt.    Progress per Plan of Care and Progress strengthening /stabilization /functional activity         Timed:          Manual Therapy:    15     mins  93333;     Therapeutic Exercise:         mins  83621;     Neuromuscular Mel:    10    mins  90170;    Therapeutic Activity:          mins  02212;     Gait Training:           mins  43499;     Ultrasound:          mins  97051;    Ionto                                   mins   27418  Self Care                            mins   89445  Canalith Repos                   mins  85294    Un-Timed:  Electrical Stimulation:    15     mins  83169 ( );  Dry Needling          mins self-pay  Traction          mins 23752  Low Eval          Mins  36180  Mod Eval          Mins  67526  High Eval                            Mins  32924  Re-Eval                               mins  30950    Timed Treatment:   25   mins   Total Treatment:     40   mins    May Christianson, PT  Physical Therapist

## 2021-02-08 ENCOUNTER — TREATMENT (OUTPATIENT)
Dept: PHYSICAL THERAPY | Facility: CLINIC | Age: 61
End: 2021-02-08

## 2021-02-08 DIAGNOSIS — M79.662 PAIN OF LEFT LOWER LEG: ICD-10-CM

## 2021-02-08 DIAGNOSIS — M25.572 ACUTE LEFT ANKLE PAIN: Primary | ICD-10-CM

## 2021-02-08 PROCEDURE — DRYNDL PR CUSTOM DRY NEEDLING SELF PAY: Performed by: PHYSICAL THERAPIST

## 2021-02-08 PROCEDURE — 97140 MANUAL THERAPY 1/> REGIONS: CPT | Performed by: PHYSICAL THERAPIST

## 2021-02-08 NOTE — PROGRESS NOTES
Physical Therapy Daily Progress Note      Patient: Irma Buchanan   : 1960  Diagnosis/ICD-10 Code:  Acute left ankle pain [M25.572]   Problems Addressed this Visit     None      Visit Diagnoses     Acute left ankle pain    -  Primary    Pain of left lower leg          Diagnoses       Codes Comments    Acute left ankle pain    -  Primary ICD-10-CM: M25.572  ICD-9-CM: 719.47     Pain of left lower leg     ICD-10-CM: M79.662  ICD-9-CM: 729.5         Referring practitioner: KARIS Arellano  Date of Initial Visit: Type: THERAPY  Noted: 2020  Today's Date: 2021    VISIT#: 20    Subjective : Has appt with podiatrist, Dr. Ahmadi, on 2021 who will also do nerve conduction study per pt. States her pain is better compared to last visit. States she has not done anything other than feed her chickens yesterday. Vaccinated cows yesterday for ~6.5 hr and states it was not as bad as last time. Feels like cold makes leg hurt more. States doing a little activity causes burning in leg that increases with continued activity. Even with resting and elevating leg, pain and burning continues and then will relax but lasts for hours at times. Burning started with standing while cooking lunch yesterday.      Objective : LE neural tension present through sciatic and common fibular nerve. Reviewed nerve glides with pt. Pt demonstrated good understanding. Educated pt on benefits of dry needling proximally at lumbar spine and following nerve path to lower leg and foot for decreased pain.  Performed dry needling with written consent to the following areas: homeostatic points = B L5 cutaneous, L  Inferior gluteal, common fibular, saphenous, tibial, deep fibular; symptomatic/ neurogenic points = B L4 paraspinals, med and lat to L sciatic trunk, and ant-lateral aspect of L ankle.  Ther ex held due to time constraints and pt compliant with HEP.  See Exercise, Manual, and Modality Logs for complete treatment.      Assessment/Plan : Good tolerance to dry needling with decreased pain and decrease neural tension reported post tx. Good tolerance to manual techniques. Pt receptive to all education done this date. Will benefit from continued dry needling and manual techniques to further decrease pain and to progress L ankle strengthening and proprioceptive training to improve ability to walk and work on uneven surfaces.    Progress per Plan of Care         Timed:         Manual Therapy:    20     mins  50911;     Therapeutic Exercise:         mins  95683;     Neuromuscular Mel:        mins  20099;    Therapeutic Activity:          mins  70039;     Gait Training:           mins  63540;     Ultrasound:          mins  79652;    Ionto                                   mins   03775  Self Care                            mins   34465  Canalith Repos                   mins  41200    Un-Timed:  Electrical Stimulation:         mins  02578 ( );  Dry Needling     10     mins self-pay  Traction          mins 29499  Low Eval          Mins  31540  Mod Eval          Mins  83151  High Eval                            Mins  53597  Re-Eval                               mins  60623    Timed Treatment:   20   mins   Total Treatment:     30   mins    May Christianson, PT  Physical Therapist

## 2021-02-10 ENCOUNTER — TREATMENT (OUTPATIENT)
Dept: PHYSICAL THERAPY | Facility: CLINIC | Age: 61
End: 2021-02-10

## 2021-02-10 DIAGNOSIS — M25.572 ACUTE LEFT ANKLE PAIN: Primary | ICD-10-CM

## 2021-02-10 DIAGNOSIS — M79.662 PAIN OF LEFT LOWER LEG: ICD-10-CM

## 2021-02-10 PROCEDURE — DRYNDL PR CUSTOM DRY NEEDLING SELF PAY: Performed by: PHYSICAL THERAPIST

## 2021-02-10 PROCEDURE — 97140 MANUAL THERAPY 1/> REGIONS: CPT | Performed by: PHYSICAL THERAPIST

## 2021-02-10 NOTE — PROGRESS NOTES
Physical Therapy Daily Progress / WORKERS COMPENSATION AUTHORIZATION Note      Patient: Irma Buchanan   : 1960  Diagnosis/ICD-10 Code:  Acute left ankle pain [M25.572]   Problems Addressed this Visit     None      Visit Diagnoses     Acute left ankle pain    -  Primary    Pain of left lower leg          Diagnoses       Codes Comments    Acute left ankle pain    -  Primary ICD-10-CM: M25.572  ICD-9-CM: 719.47     Pain of left lower leg     ICD-10-CM: M79.662  ICD-9-CM: 729.5         Referring practitioner: KARIS Arellano  Date of Initial Visit: Type: THERAPY  Noted: 2020  Today's Date: 2/10/2021    VISIT#: 21    Subjective : pt reports that her leg felt good after last visit with added dry needling but then normal activities causes pain. 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 Going up and down stairs causes pain and she has stairs to enter her home. 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 Still having pain this morning. Having near constant burning down lateral aspect of L lower leg. Increased pain and burning with sitting for >10 min. States burning starts in back of thigh and goes down both sides of lower leg. 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 States she wants to go visit her sister but it is a 2 hr drive and she is afraid she will not be able to tolerate it.  FAAM = 53/84, indicating 63% ability, 37% impairment.    Objective : tender to palpation along peroneal muscle and sends radiating pain up back of thigh.  L ankle AROM WNL. Strength grossly 4/5 to 4+/5 in all directions with pain reported in PF and eversion.  MD provided additional order on 2/3/2021 to continue PT 2-3x/wk x3 wk.  Performed dry needling with written consent to the following areas: homeostatic points = B L5 cutaneous, L  Inferior gluteal, common fibular, saphenous, tibial, deep fibular;  symptomatic/ neurogenic points = B L4 paraspinals, med and lat to L sciatic trunk, and ant-lateral aspect of L ankle.  Ther ex held due to time constraints, increase in pain, and pt compliant with HEP.  See Exercise, Manual, and Modality Logs for complete treatment.     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 Pt with continued c/o burning sensation along common fibular (peroneal) nerve, sural nerve, and sciatic nerve pathways and in associated dermatomes. Pt may benefit from med to address this and pt to talk with MD regarding this. Pt will benefit from continued PT to decrease pain and to increase strength and stability of L ankle for improved ability to walk and work on uneven surfaces.    STG:  - Increase L ankle DF AROM to 10 deg in 3 weeks. - MET  - Pt to be independent with HEP in 3 weeks. - MET  - Decrease edema so that pt can wear appropriate foot wear in 3 weeks. - MET  LTG:  - Improve FAAM and LLIS scores to 20% or less impairment by discharge. - Not met  - Increase L ankle strength to 4/5 in all directions by discharge. - MET  - Pt to return to work with max pain rating of 2-3/10 by discharge. - Not met  - Pt to be independent with donning garment by discharge. - MET    Progress per Plan of Care         Timed:         Manual Therapy:    25     mins  15142;     Therapeutic Exercise:         mins  37484;     Neuromuscular Mel:        mins  39725;    Therapeutic Activity:          mins  52279;     Gait Training:           mins  53183;     Ultrasound:          mins  12664;    Ionto                                   mins   61501  Self Care                            mins   60395  Canalith Repos                   mins  03203    Un-Timed:  Electrical Stimulation:         mins  17399 ( );  Dry Needling     15     mins self-pay  Traction          mins 78679  Low Eval          Mins  79347  Mod Eval          Mins  01411  High Eval                             Mins  58247  Re-Eval                               mins  72389    Timed Treatment:   25   mins   Total Treatment:     40   mins    May Christianson, PT  Physical Therapist

## 2021-02-18 ENCOUNTER — TREATMENT (OUTPATIENT)
Dept: PHYSICAL THERAPY | Facility: CLINIC | Age: 61
End: 2021-02-18

## 2021-02-18 DIAGNOSIS — M25.572 ACUTE LEFT ANKLE PAIN: Primary | ICD-10-CM

## 2021-02-18 DIAGNOSIS — M79.662 PAIN OF LEFT LOWER LEG: ICD-10-CM

## 2021-02-18 PROCEDURE — 97014 ELECTRIC STIMULATION THERAPY: CPT | Performed by: PHYSICAL THERAPIST

## 2021-02-18 PROCEDURE — 97140 MANUAL THERAPY 1/> REGIONS: CPT | Performed by: PHYSICAL THERAPIST

## 2021-02-18 NOTE — PROGRESS NOTES
"Physical Therapy Daily Progress Note      Patient: Irma Buchanan   : 1960  Diagnosis/ICD-10 Code:  Acute left ankle pain [M25.572]   Problems Addressed this Visit     None      Visit Diagnoses     Acute left ankle pain    -  Primary    Pain of left lower leg          Diagnoses       Codes Comments    Acute left ankle pain    -  Primary ICD-10-CM: M25.572  ICD-9-CM: 719.47     Pain of left lower leg     ICD-10-CM: M79.662  ICD-9-CM: 729.5         Referring practitioner: KARIS Arellano  Date of Initial Visit: Type: THERAPY  Noted: 2020  Today's Date: 2021    VISIT#: 22    Subjective : Pt states that dry needling only decreased pain for 2 days last visit and does not want to do dry needling today. Also reports she had swelling in L lower leg and through knee after last visit that resolved. Does not want to do any exercises today due to dropping something on her L toes and having some soreness and pain in toes with movement. States cold weather is \"recking havoc on her leg.\" Is wearing long-underwear even in her home to keep leg warm. States burning is still there but not any worse. Using heat and TENS unit at home and doing exercises that she can without hurting her toes and this gives her some relief. Sees podiatrist next Wed, 2021. Wants to do massage, heat and e-stim today. Pt reports that anterior/lateral ankle pain has been better since last visit. States she has a new worker's comp nurse/ and feels like she is helping her more than last one.    Objective : bruising noted on L great toe and at base of 2nd and 3rd toes.  See Exercise, Manual, and Modality Logs for complete treatment. Ther ex held per pt request today.    Assessment/Plan : Continued burning pain in L LE and increased pain with cold temps. New L toe pain from dropping object on toes. Decreased pain at ant/lateral aspect of L ankle since last visit. Decreased pain with STM followed by e-stim and MHP. Pt will " benefit from continued use of modalities to decrease pain and improved activity tolerance, especially on uneven surfaces.    Progress per Plan of Care         Timed:         Manual Therapy:    15     mins  51047;     Therapeutic Exercise:         mins  73564;     Neuromuscular Mel:        mins  01331;    Therapeutic Activity:          mins  30677;     Gait Training:           mins  56669;     Ultrasound:          mins  36927;    Ionto                                   mins   77534  Self Care                            mins   86877  Canalith Repos                   mins  62822    Un-Timed:  Electrical Stimulation:    15     mins  25246 ( );  Dry Needling          mins self-pay  Traction          mins 52530  Low Eval          Mins  09268  Mod Eval          Mins  88744  High Eval                            Mins  37099  Re-Eval                               mins  16061    Timed Treatment:   30   mins   Total Treatment:     30   mins    May Christianson, PT  Physical Therapist

## 2021-06-03 ENCOUNTER — DOCUMENTATION (OUTPATIENT)
Dept: PHYSICAL THERAPY | Facility: CLINIC | Age: 61
End: 2021-06-03

## 2021-06-03 DIAGNOSIS — M79.662 PAIN OF LEFT LOWER LEG: ICD-10-CM

## 2021-06-03 DIAGNOSIS — M25.572 ACUTE LEFT ANKLE PAIN: Primary | ICD-10-CM

## 2021-06-03 DIAGNOSIS — I89.0 LYMPHEDEMA OF LEFT LOWER EXTREMITY: ICD-10-CM

## 2021-06-03 NOTE — PROGRESS NOTES
Discharge Summary  Discharge Summary from Physical Therapy Report      Dates  PT visit: 11/16/2020-2/18/2021  Number of Visits: 22     Discharge Status of Patient: See MD Note dated 2/18/2021    Goals: Partially Met    Discharge Plan: Continue with current home exercise program as instructed    Comments : Pt cancelled last appt due to having MD appt and did not call to schedule additional visits.    Date of Discharge 6/3/2021        May Christianson, PT  Physical Therapist

## 2021-07-05 DIAGNOSIS — I10 UNCONTROLLED HYPERTENSION: ICD-10-CM

## 2021-07-06 ENCOUNTER — DOCUMENTATION (OUTPATIENT)
Dept: PHYSICAL THERAPY | Facility: CLINIC | Age: 61
End: 2021-07-06

## 2021-07-06 DIAGNOSIS — M54.2 CERVICAL PAIN: Primary | ICD-10-CM

## 2021-07-06 RX ORDER — LOSARTAN POTASSIUM AND HYDROCHLOROTHIAZIDE 12.5; 5 MG/1; MG/1
TABLET ORAL
Qty: 90 TABLET | Refills: 1 | OUTPATIENT
Start: 2021-07-06

## 2021-07-06 NOTE — PROGRESS NOTES
Discharge Summary  Discharge Summary from Physical Therapy Report      Dates  PT visit: 1/13/2021-1/20/2021  Number of Visits: 2     Discharge Status of Patient: See MD Note dated 1/20/2021    Goals: Partially Met    Discharge Plan: Continue with current home exercise program as instructed    Comments : Pt reported decreased neck pain.    Date of Discharge 7/6/2021        May Christianson, PT  Physical Therapist

## 2021-07-07 DIAGNOSIS — I10 UNCONTROLLED HYPERTENSION: ICD-10-CM

## 2021-07-07 NOTE — TELEPHONE ENCOUNTER
Caller: Irma Buchanan    Relationship: Self    Best call back number: [phone]     Medication needed:   Requested Prescriptions     Pending Prescriptions Disp Refills   • losartan-hydrochlorothiazide (HYZAAR) 50-12.5 MG per tablet 90 tablet 1     Sig: Take 1 tablet by mouth Daily.       When do you need the refill by: 07/07/21    What additional details did the patient provide when requesting the medication: PATIENT IS SCHEDULED FOR 08/31/21 FOR A MED REFILL APPOINTMENT. PATIENT CURRENTLY DOES NOT HAVE HEALTH INSURANCE, BUT WILL GET IT IN AUGUST AND REQUESTED TO HOLD OFF UNTIL THEN.    Does the patient have less than a 3 day supply:  [x] Yes  [] No    What is the patient's [facility] [address] [phone] [facility] [phone] FX

## 2021-07-09 RX ORDER — LOSARTAN POTASSIUM AND HYDROCHLOROTHIAZIDE 12.5; 5 MG/1; MG/1
1 TABLET ORAL DAILY
Qty: 90 TABLET | Refills: 1 | Status: SHIPPED | OUTPATIENT
Start: 2021-07-09 | End: 2021-08-16

## 2021-07-13 ENCOUNTER — CLINICAL SUPPORT (OUTPATIENT)
Dept: FAMILY MEDICINE CLINIC | Facility: CLINIC | Age: 61
End: 2021-07-13

## 2021-07-13 VITALS
DIASTOLIC BLOOD PRESSURE: 72 MMHG | WEIGHT: 170.2 LBS | HEIGHT: 65 IN | SYSTOLIC BLOOD PRESSURE: 138 MMHG | HEART RATE: 84 BPM | RESPIRATION RATE: 18 BRPM | BODY MASS INDEX: 28.36 KG/M2 | OXYGEN SATURATION: 98 % | TEMPERATURE: 97.3 F

## 2021-07-13 DIAGNOSIS — Z02.4 ENCOUNTER FOR CDL (COMMERCIAL DRIVING LICENSE) EXAM: Primary | ICD-10-CM

## 2021-07-13 LAB
BILIRUB BLD-MCNC: NEGATIVE MG/DL
CLARITY, POC: CLEAR
COLOR UR: YELLOW
GLUCOSE UR STRIP-MCNC: NEGATIVE MG/DL
KETONES UR QL: ABNORMAL
LEUKOCYTE EST, POC: NEGATIVE
NITRITE UR-MCNC: NEGATIVE MG/ML
PH UR: 9 [PH] (ref 5–8)
PROT UR STRIP-MCNC: ABNORMAL MG/DL
RBC # UR STRIP: NEGATIVE /UL
SP GR UR: 1.01 (ref 1–1.03)
UROBILINOGEN UR QL: NORMAL

## 2021-07-13 PROCEDURE — 81002 URINALYSIS NONAUTO W/O SCOPE: CPT | Performed by: FAMILY MEDICINE

## 2021-07-13 PROCEDURE — DOTPHY: Performed by: FAMILY MEDICINE

## 2021-07-13 NOTE — PROGRESS NOTES
Medical Examination    Subjective   Irma Buchanan is a 60 y.o. female who presents today for a  fitness determination physical exam. The patient reports no problems.  The following portions of the patient's history were reviewed and updated as appropriate: allergies, current medications, past family history, past medical history, past social history, past surgical history and problem list.  Review of Systems  A comprehensive review of systems was negative.    Objective    Vision:   Visual Acuity Screening    Right eye Left eye Both eyes   Without correction:      With correction: 20/20 20/20 20/20       Applicant can recognize and distinguish among traffic control signals and devices showing standard red, green, and jodee colors.  Applicant has peripheral vision to 90 degrees in each eye.         Monocular Vision?: No      Hearing:  Applicant can distinguish forced whisper at a distance of 5 feet with both ears.         Physical Exam  Constitutional:       Appearance: She is well-developed.   HENT:      Head: Normocephalic and atraumatic.      Left Ear: External ear normal.      Nose: Nose normal.   Eyes:      Pupils: Pupils are equal, round, and reactive to light.   Cardiovascular:      Rate and Rhythm: Normal rate and regular rhythm.      Heart sounds: Normal heart sounds.   Pulmonary:      Effort: Pulmonary effort is normal.   Abdominal:      General: Bowel sounds are normal.      Palpations: Abdomen is soft.   Musculoskeletal:         General: Normal range of motion.      Cervical back: Normal range of motion and neck supple.   Skin:     General: Skin is warm and dry.   Neurological:      Mental Status: She is alert and oriented to person, place, and time.   Psychiatric:         Behavior: Behavior normal.         Thought Content: Thought content normal.         Judgment: Judgment normal.          Labs:  Lab Results   Component Value Date    SPECGRAV 1.010 07/13/2021     BILIRUBINUR Negative 07/13/2021    GLUCOSEU Negative 01/11/2019       Assessment/Plan   Healthy female exam.   Meets standards in 49 .41;  qualifies for 2 year certificate.     Medical examiners certificate completed and printed.  Return as needed.

## 2021-08-14 DIAGNOSIS — I10 UNCONTROLLED HYPERTENSION: ICD-10-CM

## 2021-08-16 RX ORDER — LOSARTAN POTASSIUM AND HYDROCHLOROTHIAZIDE 12.5; 5 MG/1; MG/1
TABLET ORAL
Qty: 90 TABLET | Refills: 1 | Status: SHIPPED | OUTPATIENT
Start: 2021-08-16 | End: 2021-08-31 | Stop reason: SDUPTHER

## 2021-08-31 ENCOUNTER — OFFICE VISIT (OUTPATIENT)
Dept: FAMILY MEDICINE CLINIC | Facility: CLINIC | Age: 61
End: 2021-08-31

## 2021-08-31 VITALS
HEIGHT: 65 IN | BODY MASS INDEX: 28.42 KG/M2 | HEART RATE: 59 BPM | SYSTOLIC BLOOD PRESSURE: 136 MMHG | OXYGEN SATURATION: 98 % | RESPIRATION RATE: 18 BRPM | DIASTOLIC BLOOD PRESSURE: 88 MMHG | TEMPERATURE: 97.5 F | WEIGHT: 170.6 LBS

## 2021-08-31 DIAGNOSIS — Z12.31 BREAST CANCER SCREENING BY MAMMOGRAM: ICD-10-CM

## 2021-08-31 DIAGNOSIS — E03.9 HYPOTHYROIDISM, UNSPECIFIED TYPE: ICD-10-CM

## 2021-08-31 DIAGNOSIS — E78.2 MIXED HYPERLIPIDEMIA: ICD-10-CM

## 2021-08-31 DIAGNOSIS — E03.9 ACQUIRED HYPOTHYROIDISM: ICD-10-CM

## 2021-08-31 DIAGNOSIS — I10 UNCONTROLLED HYPERTENSION: ICD-10-CM

## 2021-08-31 DIAGNOSIS — D51.0 PERNICIOUS ANEMIA: ICD-10-CM

## 2021-08-31 DIAGNOSIS — E55.9 VITAMIN D DEFICIENCY: ICD-10-CM

## 2021-08-31 DIAGNOSIS — Z11.59 NEED FOR HEPATITIS C SCREENING TEST: ICD-10-CM

## 2021-08-31 DIAGNOSIS — I10 BENIGN HYPERTENSION: Primary | ICD-10-CM

## 2021-08-31 DIAGNOSIS — J30.9 ALLERGIC RHINITIS, UNSPECIFIED SEASONALITY, UNSPECIFIED TRIGGER: ICD-10-CM

## 2021-08-31 PROBLEM — E66.3 OVER WEIGHT: Status: ACTIVE | Noted: 2021-08-31

## 2021-08-31 PROCEDURE — 99396 PREV VISIT EST AGE 40-64: CPT | Performed by: FAMILY MEDICINE

## 2021-08-31 RX ORDER — LOSARTAN POTASSIUM AND HYDROCHLOROTHIAZIDE 12.5; 5 MG/1; MG/1
1 TABLET ORAL DAILY
Qty: 90 TABLET | Refills: 3 | Status: SHIPPED | OUTPATIENT
Start: 2021-08-31 | End: 2022-03-07

## 2021-08-31 RX ORDER — LEVOTHYROXINE SODIUM 0.1 MG/1
100 TABLET ORAL DAILY
Qty: 90 TABLET | Refills: 3 | Status: SHIPPED | OUTPATIENT
Start: 2021-08-31 | End: 2022-09-15

## 2021-08-31 RX ORDER — MONTELUKAST SODIUM 10 MG/1
10 TABLET ORAL
Qty: 90 TABLET | Refills: 3 | Status: SHIPPED | OUTPATIENT
Start: 2021-08-31 | End: 2022-09-15

## 2021-08-31 NOTE — PROGRESS NOTES
Subjective   Irma Dias is a 60 y.o. female.   Chief Complaint   Patient presents with   • Hypertension       The patient is here: for coordination of medical care and annual wellness examination.    Due for mammo, labs  Needs refill on meds     Hypertension  This is a new problem. The current episode started in the past 7 days. The problem has been waxing and waning since onset. The problem is uncontrolled. Associated symptoms include headaches. Pertinent negatives include no chest pain, neck pain or shortness of breath. There are no associated agents to hypertension. Risk factors for coronary artery disease include post-menopausal state. Treatments tried: hydralzine  The current treatment provides mild improvement. There are no compliance problems.         The following portions of the patient's history were reviewed and updated as appropriate: allergies, current medications, past family history, past medical history, past social history, past surgical history and problem list.    Patient Active Problem List   Diagnosis   • Benign hypertension   • Allergic rhinitis   • Hypothyroidism   • Mild intermittent asthma without complication   • Menopausal syndrome   • Acute reaction to stress   • Aortic valve stenosis   • Mixed hyperlipidemia   • Gilbert's syndrome   • Genital herpes simplex   • Acute non-recurrent maxillary sinusitis   • Astigmatism   • Chronic maxillary sinusitis   • Over weight       Current Outpatient Medications on File Prior to Visit   Medication Sig Dispense Refill   • azelastine (ASTELIN) 0.1 % nasal spray      • Cholecalciferol (VITAMIN D3) 22720 units tablet Take 1 tablet by mouth 3 (Three) Times a Week.     • Estradiol-Estriol-Progesterone (BIEST/PROGESTERONE TD) DISSOLVE ONE JONATHAN BETWEEN CHEEK AND TEETH TWICE DAILY. ALLOW TO DISSOLVE SLOWLY  4   • Progesterone 40 % cream 75mg- 0.25mg cream  5   • [DISCONTINUED] levoFLOXacin (Levaquin) 500 MG tablet Take 1 tablet by mouth Daily. 14 tablet  "0   • [DISCONTINUED] levothyroxine (SYNTHROID, LEVOTHROID) 100 MCG tablet TAKE ONE TABLET BY MOUTH ONCE DAILY 90 tablet 3   • [DISCONTINUED] losartan-hydrochlorothiazide (HYZAAR) 50-12.5 MG per tablet TAKE ONE TABLET BY MOUTH ONCE DAILY 90 tablet 1   • [DISCONTINUED] montelukast (SINGULAIR) 10 MG tablet TAKE ONE TABLET BY MOUTH ONCE DAILY AT BEDTIME 90 tablet 3     No current facility-administered medications on file prior to visit.     Current outpatient and discharge medications have been reconciled for the patient.  Reviewed by: Jesse Renteria MD      Allergies   Allergen Reactions   • Codeine Nausea Only and Hallucinations   • Penicillin V Potassium Rash   • Sulfamethoxazole-Trimethoprim Rash   • Cefprozil Itching   • Lisinopril Cough   • Sulfa Antibiotics Hallucinations   • Penicillins Rash       Review of Systems   Constitutional: Negative for activity change, appetite change, fatigue and fever.   HENT: Negative for ear pain, swollen glands and voice change.    Eyes: Negative for visual disturbance.   Respiratory: Negative for shortness of breath and wheezing.    Cardiovascular: Negative for chest pain and leg swelling.   Gastrointestinal: Negative for abdominal pain, blood in stool, constipation, diarrhea, nausea and vomiting.   Endocrine: Negative for polydipsia and polyuria.   Genitourinary: Negative for dysuria, frequency and hematuria.   Musculoskeletal: Negative for joint swelling, neck pain and neck stiffness.   Skin: Negative for rash and bruise.   Neurological: Negative for weakness, numbness and headache.   Psychiatric/Behavioral: Negative for suicidal ideas and depressed mood.     I have reviewed and confirmed the accuracy of the ROS as documented by the MA/LPN/RN Jesse Renteria MD    Objective   Visit Vitals  /88   Pulse 59   Temp 97.5 °F (36.4 °C)   Resp 18   Ht 165.1 cm (65\")   Wt 77.4 kg (170 lb 9.6 oz)   SpO2 98%   BMI 28.39 kg/m²       Physical Exam  Constitutional:       " Appearance: She is well-developed.   HENT:      Head: Normocephalic and atraumatic.      Right Ear: External ear normal.      Left Ear: External ear normal.      Nose: Nose normal.   Eyes:      Pupils: Pupils are equal, round, and reactive to light.   Cardiovascular:      Rate and Rhythm: Normal rate and regular rhythm.      Heart sounds: Normal heart sounds.   Pulmonary:      Effort: Pulmonary effort is normal.      Breath sounds: Normal breath sounds.   Abdominal:      General: Bowel sounds are normal.      Palpations: Abdomen is soft.   Musculoskeletal:         General: Normal range of motion.      Cervical back: Normal range of motion and neck supple.   Skin:     General: Skin is warm and dry.   Neurological:      Mental Status: She is alert and oriented to person, place, and time.      Motor: Weakness present.      Gait: Gait abnormal.   Psychiatric:         Behavior: Behavior normal.         Thought Content: Thought content normal.         Judgment: Judgment normal.       Assessment/Plan .    Problem List Items Addressed This Visit     Benign hypertension - Primary    Relevant Medications    losartan-hydrochlorothiazide (HYZAAR) 50-12.5 MG per tablet    Other Relevant Orders    CBC Auto Differential    Comprehensive Metabolic Panel    Allergic rhinitis    Relevant Medications    montelukast (SINGULAIR) 10 MG tablet    Hypothyroidism    Relevant Medications    levothyroxine (SYNTHROID, LEVOTHROID) 100 MCG tablet    Other Relevant Orders    TSH    Mixed hyperlipidemia    Relevant Orders    Comprehensive Metabolic Panel    Lipid Panel With / Chol / HDL Ratio      Other Visit Diagnoses     Need for hepatitis C screening test        Relevant Orders    Hepatitis C Antibody    Uncontrolled hypertension        Relevant Medications    losartan-hydrochlorothiazide (HYZAAR) 50-12.5 MG per tablet    Breast cancer screening by mammogram        Relevant Orders    Mammo Screening Digital Tomosynthesis Bilateral With CAD     Vitamin D deficiency        Relevant Orders    Vitamin D 25 hydroxy    Pernicious anemia        Relevant Orders    Vitamin B12      Discussed anticipatory guidance, diet, exercise, and weight loss.  Discussed safety and routine screening examinations.  Discussed self-examinations.   Medication and medication adverse effects discussed.  Drug education given and explained to patient. Patient verbalized understanding.  Follow-up for routine health maintenance as directed  Follow up in 6 months for recheck, sooner for worsening symptoms or any concerns.      I wore protective equipment throughout this patient encounter to include mask and gloves. Hand hygiene was performed before donning protective equipment and after removal when leaving the room

## 2021-09-01 LAB
25(OH)D3+25(OH)D2 SERPL-MCNC: 32.2 NG/ML (ref 30–100)
ALBUMIN SERPL-MCNC: 4.2 G/DL (ref 3.8–4.8)
ALBUMIN/GLOB SERPL: 1.6 {RATIO} (ref 1.2–2.2)
ALP SERPL-CCNC: 60 IU/L (ref 48–121)
ALT SERPL-CCNC: 15 IU/L (ref 0–32)
AST SERPL-CCNC: 15 IU/L (ref 0–40)
BASOPHILS # BLD AUTO: 0 X10E3/UL (ref 0–0.2)
BASOPHILS NFR BLD AUTO: 1 %
BILIRUB SERPL-MCNC: 1.2 MG/DL (ref 0–1.2)
BUN SERPL-MCNC: 9 MG/DL (ref 8–27)
BUN/CREAT SERPL: 9 (ref 12–28)
CALCIUM SERPL-MCNC: 9.4 MG/DL (ref 8.7–10.3)
CHLORIDE SERPL-SCNC: 102 MMOL/L (ref 96–106)
CHOLEST SERPL-MCNC: 227 MG/DL (ref 100–199)
CHOLEST/HDLC SERPL: 3.8 RATIO (ref 0–4.4)
CO2 SERPL-SCNC: 25 MMOL/L (ref 20–29)
CREAT SERPL-MCNC: 0.96 MG/DL (ref 0.57–1)
EOSINOPHIL # BLD AUTO: 0.2 X10E3/UL (ref 0–0.4)
EOSINOPHIL NFR BLD AUTO: 4 %
ERYTHROCYTE [DISTWIDTH] IN BLOOD BY AUTOMATED COUNT: 12.7 % (ref 11.7–15.4)
GLOBULIN SER CALC-MCNC: 2.7 G/DL (ref 1.5–4.5)
GLUCOSE SERPL-MCNC: 87 MG/DL (ref 65–99)
HCT VFR BLD AUTO: 41.4 % (ref 34–46.6)
HCV AB S/CO SERPL IA: <0.1 S/CO RATIO (ref 0–0.9)
HDLC SERPL-MCNC: 60 MG/DL
HGB BLD-MCNC: 14.1 G/DL (ref 11.1–15.9)
IMM GRANULOCYTES # BLD AUTO: 0 X10E3/UL (ref 0–0.1)
IMM GRANULOCYTES NFR BLD AUTO: 0 %
LDLC SERPL CALC-MCNC: 151 MG/DL (ref 0–99)
LYMPHOCYTES # BLD AUTO: 1.7 X10E3/UL (ref 0.7–3.1)
LYMPHOCYTES NFR BLD AUTO: 34 %
MCH RBC QN AUTO: 30.7 PG (ref 26.6–33)
MCHC RBC AUTO-ENTMCNC: 34.1 G/DL (ref 31.5–35.7)
MCV RBC AUTO: 90 FL (ref 79–97)
MONOCYTES # BLD AUTO: 0.4 X10E3/UL (ref 0.1–0.9)
MONOCYTES NFR BLD AUTO: 7 %
NEUTROPHILS # BLD AUTO: 2.8 X10E3/UL (ref 1.4–7)
NEUTROPHILS NFR BLD AUTO: 54 %
PLATELET # BLD AUTO: 218 X10E3/UL (ref 150–450)
POTASSIUM SERPL-SCNC: 4.1 MMOL/L (ref 3.5–5.2)
PROT SERPL-MCNC: 6.9 G/DL (ref 6–8.5)
RBC # BLD AUTO: 4.6 X10E6/UL (ref 3.77–5.28)
SODIUM SERPL-SCNC: 140 MMOL/L (ref 134–144)
TRIGL SERPL-MCNC: 89 MG/DL (ref 0–149)
TSH SERPL DL<=0.005 MIU/L-ACNC: 1.14 UIU/ML (ref 0.45–4.5)
VIT B12 SERPL-MCNC: 345 PG/ML (ref 232–1245)
VLDLC SERPL CALC-MCNC: 16 MG/DL (ref 5–40)
WBC # BLD AUTO: 5.1 X10E3/UL (ref 3.4–10.8)

## 2021-09-14 ENCOUNTER — TELEPHONE (OUTPATIENT)
Dept: FAMILY MEDICINE CLINIC | Facility: CLINIC | Age: 61
End: 2021-09-14

## 2021-09-14 NOTE — TELEPHONE ENCOUNTER
HERVE WITH [facility] CALLED NEEDING MAMMOGRAM ORDER FOR PATIENT. SHE HAS AN APPOINTMENT THIS AFTERNOON AT 1:30      CALL BACK NUMBER [phone] ext [phone]

## 2022-03-06 DIAGNOSIS — I10 UNCONTROLLED HYPERTENSION: ICD-10-CM

## 2022-03-07 RX ORDER — LOSARTAN POTASSIUM AND HYDROCHLOROTHIAZIDE 12.5; 5 MG/1; MG/1
TABLET ORAL
Qty: 90 TABLET | Refills: 1 | Status: SHIPPED | OUTPATIENT
Start: 2022-03-07 | End: 2022-05-27

## 2022-05-27 DIAGNOSIS — I10 UNCONTROLLED HYPERTENSION: ICD-10-CM

## 2022-05-27 RX ORDER — LOSARTAN POTASSIUM AND HYDROCHLOROTHIAZIDE 12.5; 5 MG/1; MG/1
TABLET ORAL
Qty: 90 TABLET | Refills: 1 | Status: SHIPPED | OUTPATIENT
Start: 2022-05-27 | End: 2022-09-15

## 2022-07-19 ENCOUNTER — TELEPHONE (OUTPATIENT)
Dept: FAMILY MEDICINE CLINIC | Facility: CLINIC | Age: 62
End: 2022-07-19

## 2022-07-19 DIAGNOSIS — J01.80 ACUTE NON-RECURRENT SINUSITIS OF OTHER SINUS: Primary | ICD-10-CM

## 2022-07-19 RX ORDER — AZITHROMYCIN 250 MG/1
TABLET, FILM COATED ORAL
Qty: 6 TABLET | Refills: 0 | Status: SHIPPED | OUTPATIENT
Start: 2022-07-19 | End: 2022-09-15

## 2022-07-19 NOTE — TELEPHONE ENCOUNTER
Patient called stating she has a sinus infection : headache, tooth pain, facial swelling, and low grade fever. She is requesting we send in a medication to [facility]

## 2022-09-05 DIAGNOSIS — E03.9 HYPOTHYROIDISM, UNSPECIFIED TYPE: ICD-10-CM

## 2022-09-05 DIAGNOSIS — J30.9 ALLERGIC RHINITIS, UNSPECIFIED SEASONALITY, UNSPECIFIED TRIGGER: ICD-10-CM

## 2022-09-06 RX ORDER — LEVOTHYROXINE SODIUM 0.1 MG/1
TABLET ORAL
Qty: 90 TABLET | Refills: 3 | OUTPATIENT
Start: 2022-09-06

## 2022-09-06 RX ORDER — MONTELUKAST SODIUM 10 MG/1
TABLET ORAL
Qty: 90 TABLET | Refills: 3 | OUTPATIENT
Start: 2022-09-06

## 2022-09-12 NOTE — PROGRESS NOTES
Subjective   Irma Dias is a 61 y.o. female.   Chief Complaint   Patient presents with   • Annual Exam       History of Present Illness  The patient is here: for coordination of medical care.  Patient has: moderate activity with work/home activities, good appetite, feels well with minor complaints, good energy level and is sleeping well    COVID-19 Vaccine(1) Never done  Pneumococcal Vaccine 0-64(1 - PCV) Never done  ZOSTER VACCINE(1 of 2) Never done  LIPID PANEL due on 08/31/2022  INFLUENZA VACCINE due on 10/01/2022  MAMMOGRAM due on 09/14/2023  ANNUAL PHYSICAL due on 09/16/2023  COLORECTAL CANCER SCREENING due on 08/05/2029  TDAP/TD VACCINES(3 - Td or Tdap) due on 12/22/2030  HEPATITIS C SCREENING Completed  PAP SMEAR Discontinued  Current pain scale 0/10.      Left leg erythema and increased warmth. No fever   Hypertension  This is a recurrent problem. The current episode started more than 1 year ago. The problem has been waxing and waning since onset. The problem is uncontrolled. Associated symptoms include headaches. Pertinent negatives include no chest pain, neck pain or shortness of breath. There are no associated agents to hypertension. Risk factors for coronary artery disease include post-menopausal state. Current antihypertension treatment includes diuretics and angiotensin blockers. The current treatment provides mild improvement. There are no compliance problems.         The following portions of the patient's history were reviewed and updated as appropriate: allergies, current medications, past family history, past medical history, past social history, past surgical history and problem list.    Patient Active Problem List   Diagnosis   • Benign hypertension   • Allergic rhinitis   • Hypothyroidism   • Mild intermittent asthma without complication   • Menopausal syndrome   • Acute reaction to stress   • Aortic valve stenosis   • Mixed hyperlipidemia   • Gilbert's syndrome   • Genital herpes simplex    • Acute non-recurrent maxillary sinusitis   • Astigmatism   • Chronic maxillary sinusitis   • Class 1 obesity due to excess calories with serious comorbidity and body mass index (BMI) of 30.0 to 30.9 in adult       Current Outpatient Medications on File Prior to Visit   Medication Sig Dispense Refill   • azelastine (ASTELIN) 0.1 % nasal spray      • Cholecalciferol (VITAMIN D3) 16382 units tablet Take 1 tablet by mouth 3 (Three) Times a Week.     • clobetasol (TEMOVATE) 0.05 % cream      • Estradiol-Estriol-Progesterone (BIEST/PROGESTERONE TD) DISSOLVE ONE JONATHAN BETWEEN CHEEK AND TEETH TWICE DAILY. ALLOW TO DISSOLVE SLOWLY  4   • Progesterone 40 % cream 75mg- 0.25mg cream  5   • [DISCONTINUED] levothyroxine (SYNTHROID, LEVOTHROID) 100 MCG tablet Take 1 tablet by mouth Daily. 90 tablet 3   • [DISCONTINUED] losartan-hydrochlorothiazide (HYZAAR) 50-12.5 MG per tablet TAKE ONE TABLET BY MOUTH ONCE DAILY 90 tablet 1   • [DISCONTINUED] montelukast (SINGULAIR) 10 MG tablet Take 1 tablet by mouth Daily With Breakfast. 90 tablet 3   • [DISCONTINUED] azithromycin (ZITHROMAX) 250 MG tablet Take 2 tablets the first day, then 1 tablet daily for 4 days. 6 tablet 0     No current facility-administered medications on file prior to visit.     Current outpatient and discharge medications have been reconciled for the patient.  Reviewed by: Jesse Renteria MD      Allergies   Allergen Reactions   • Codeine Nausea Only and Hallucinations   • Penicillin V Potassium Rash   • Sulfamethoxazole-Trimethoprim Rash   • Cefprozil Itching   • Gabapentin Hallucinations   • Lisinopril Cough   • Sulfa Antibiotics Hallucinations   • Penicillins Rash       Review of Systems   Constitutional: Negative for activity change, appetite change, fatigue and fever.   HENT: Negative for ear pain, swollen glands and voice change.    Eyes: Negative for visual disturbance.   Respiratory: Negative for shortness of breath and wheezing.    Cardiovascular:  Negative for chest pain and leg swelling.   Gastrointestinal: Negative for abdominal pain, blood in stool, constipation, diarrhea, nausea and vomiting.   Endocrine: Negative for polydipsia and polyuria.   Genitourinary: Negative for dysuria, frequency and hematuria.   Musculoskeletal: Positive for myalgias. Negative for joint swelling, neck pain and neck stiffness.   Skin: Negative for rash and wound.   Neurological: Negative for weakness, numbness and headache.   Psychiatric/Behavioral: Negative for suicidal ideas and depressed mood.     I have reviewed and confirmed the accuracy of the ROS as documented by the MA/LPN/RN Jesse Renteria MD    Objective   Visit Vitals  /86 (BP Location: Right arm, Patient Position: Sitting, Cuff Size: Adult)   Pulse 77   Temp 97.7 °F (36.5 °C)   Resp 18   Wt 82.7 kg (182 lb 6.4 oz)   SpO2 97%   BMI 30.35 kg/m²       Physical Exam  Constitutional:       Appearance: She is well-developed.   HENT:      Head: Normocephalic and atraumatic.      Right Ear: External ear normal.      Left Ear: External ear normal.      Nose: Nose normal.   Eyes:      Pupils: Pupils are equal, round, and reactive to light.   Cardiovascular:      Rate and Rhythm: Normal rate and regular rhythm.      Heart sounds: Normal heart sounds.   Pulmonary:      Effort: Pulmonary effort is normal.      Breath sounds: Normal breath sounds.   Abdominal:      General: Bowel sounds are normal.      Palpations: Abdomen is soft.   Musculoskeletal:         General: Normal range of motion.      Cervical back: Normal range of motion and neck supple.   Skin:     General: Skin is warm and dry.   Neurological:      Mental Status: She is alert and oriented to person, place, and time.   Psychiatric:         Behavior: Behavior normal.         Thought Content: Thought content normal.         Judgment: Judgment normal.       Derm Physical Exam    Diagnoses and all orders for this visit:    1. Annual physical exam  (Primary)  -     Comprehensive Metabolic Panel  -     Lipid Panel With / Chol / HDL Ratio  -     TSH  -     CBC & Differential    2. Class 1 obesity due to excess calories with serious comorbidity and body mass index (BMI) of 30.0 to 30.9 in adult    3. Cellulitis of left lower extremity  -     levoFLOXacin (Levaquin) 500 MG tablet; Take 1 tablet by mouth Daily for 10 days.  Dispense: 10 tablet; Refill: 0    4. Encounter for screening mammogram for malignant neoplasm of breast  -     Mammo Screening Digital Tomosynthesis Bilateral With CAD    5. Allergic rhinitis, unspecified seasonality, unspecified trigger  -     montelukast (SINGULAIR) 10 MG tablet; Take 1 tablet by mouth Daily With Breakfast.  Dispense: 90 tablet; Refill: 3    6. Hypothyroidism, unspecified type  -     levothyroxine (SYNTHROID, LEVOTHROID) 100 MCG tablet; Take 1 tablet by mouth Daily.  Dispense: 90 tablet; Refill: 3    7. Benign hypertension  -     losartan-hydrochlorothiazide (HYZAAR) 50-12.5 MG per tablet; Take 1 tablet by mouth Daily.  Dispense: 90 tablet; Refill: 3    Discussed anticipatory guidance, diet, exercise, and weight loss.  Discussed safety and routine screening examinations.  Discussed self-examinations.     Irma Dias  reports that she has never smoked. She has never used smokeless tobacco..  Follow-up for routine health maintenance as indicated.       Expected course, medications, and adverse effects discussed as appropriate.  Call or return if worsening or persistent symptoms.  I wore protective equipment throughout this patient encounter to include mask and eye protection. Hand hygiene was performed before donning protective equipment and after removal when leaving the room.       This document is intended for medical expert use only. Reading of this document by patients and/or patient's family without participating medical staff guidance may result in misinterpretation and unintended morbidity. Any interpretation of such  data is the responsibility of the patient and/or family member responsible for the patient in concert with their primary or specialist providers, not to be left for sources of online searches such as [employer], [employer] or similar queries. Relying on these approaches to knowledge may result in misinterpretation, misguided goals of care and even death should patients or family members try recommendations outside of the realm of professional medical care.

## 2022-09-15 ENCOUNTER — OFFICE VISIT (OUTPATIENT)
Dept: FAMILY MEDICINE CLINIC | Facility: CLINIC | Age: 62
End: 2022-09-15

## 2022-09-15 VITALS
DIASTOLIC BLOOD PRESSURE: 86 MMHG | OXYGEN SATURATION: 97 % | BODY MASS INDEX: 30.35 KG/M2 | SYSTOLIC BLOOD PRESSURE: 134 MMHG | RESPIRATION RATE: 18 BRPM | WEIGHT: 182.4 LBS | TEMPERATURE: 97.7 F | HEART RATE: 77 BPM

## 2022-09-15 DIAGNOSIS — Z00.00 ANNUAL PHYSICAL EXAM: Primary | ICD-10-CM

## 2022-09-15 DIAGNOSIS — E66.09 CLASS 1 OBESITY DUE TO EXCESS CALORIES WITH SERIOUS COMORBIDITY AND BODY MASS INDEX (BMI) OF 30.0 TO 30.9 IN ADULT: ICD-10-CM

## 2022-09-15 DIAGNOSIS — Z12.31 ENCOUNTER FOR SCREENING MAMMOGRAM FOR MALIGNANT NEOPLASM OF BREAST: ICD-10-CM

## 2022-09-15 DIAGNOSIS — I10 BENIGN HYPERTENSION: ICD-10-CM

## 2022-09-15 DIAGNOSIS — E03.9 HYPOTHYROIDISM, UNSPECIFIED TYPE: ICD-10-CM

## 2022-09-15 DIAGNOSIS — J30.9 ALLERGIC RHINITIS, UNSPECIFIED SEASONALITY, UNSPECIFIED TRIGGER: ICD-10-CM

## 2022-09-15 DIAGNOSIS — L03.116 CELLULITIS OF LEFT LOWER EXTREMITY: ICD-10-CM

## 2022-09-15 PROBLEM — E66.811 CLASS 1 OBESITY DUE TO EXCESS CALORIES WITH SERIOUS COMORBIDITY AND BODY MASS INDEX (BMI) OF 30.0 TO 30.9 IN ADULT: Status: ACTIVE | Noted: 2022-09-15

## 2022-09-15 PROBLEM — E66.3 OVER WEIGHT: Status: RESOLVED | Noted: 2021-08-31 | Resolved: 2022-09-15

## 2022-09-15 PROCEDURE — 99396 PREV VISIT EST AGE 40-64: CPT | Performed by: FAMILY MEDICINE

## 2022-09-15 RX ORDER — LOSARTAN POTASSIUM AND HYDROCHLOROTHIAZIDE 12.5; 5 MG/1; MG/1
1 TABLET ORAL DAILY
Qty: 90 TABLET | Refills: 3 | Status: SHIPPED | OUTPATIENT
Start: 2022-09-15

## 2022-09-15 RX ORDER — MONTELUKAST SODIUM 10 MG/1
10 TABLET ORAL
Qty: 90 TABLET | Refills: 3 | Status: SHIPPED | OUTPATIENT
Start: 2022-09-15

## 2022-09-15 RX ORDER — LEVOTHYROXINE SODIUM 0.1 MG/1
100 TABLET ORAL DAILY
Qty: 90 TABLET | Refills: 3 | Status: SHIPPED | OUTPATIENT
Start: 2022-09-15

## 2022-09-15 RX ORDER — LEVOFLOXACIN 500 MG/1
500 TABLET, FILM COATED ORAL DAILY
Qty: 10 TABLET | Refills: 0 | Status: SHIPPED | OUTPATIENT
Start: 2022-09-15 | End: 2022-09-25

## 2022-09-15 RX ORDER — CLOBETASOL PROPIONATE 0.5 MG/G
CREAM TOPICAL
COMMUNITY
Start: 2022-08-26

## 2022-09-16 ENCOUNTER — TELEPHONE (OUTPATIENT)
Dept: FAMILY MEDICINE CLINIC | Facility: CLINIC | Age: 62
End: 2022-09-16

## 2022-09-16 LAB
ALBUMIN SERPL-MCNC: 4.1 G/DL (ref 3.8–4.8)
ALBUMIN/GLOB SERPL: 1.6 {RATIO} (ref 1.2–2.2)
ALP SERPL-CCNC: 62 IU/L (ref 44–121)
ALT SERPL-CCNC: 14 IU/L (ref 0–32)
AST SERPL-CCNC: 11 IU/L (ref 0–40)
BASOPHILS # BLD AUTO: 0 X10E3/UL (ref 0–0.2)
BASOPHILS NFR BLD AUTO: 1 %
BILIRUB SERPL-MCNC: 1.5 MG/DL (ref 0–1.2)
BUN SERPL-MCNC: 12 MG/DL (ref 8–27)
BUN/CREAT SERPL: 11 (ref 12–28)
CALCIUM SERPL-MCNC: 9.6 MG/DL (ref 8.7–10.3)
CHLORIDE SERPL-SCNC: 98 MMOL/L (ref 96–106)
CHOLEST SERPL-MCNC: 196 MG/DL (ref 100–199)
CHOLEST/HDLC SERPL: 4.2 RATIO (ref 0–4.4)
CO2 SERPL-SCNC: 24 MMOL/L (ref 20–29)
CREAT SERPL-MCNC: 1.09 MG/DL (ref 0.57–1)
EGFRCR-CYS SERPLBLD CKD-EPI 2021: 57 ML/MIN/1.73
EOSINOPHIL # BLD AUTO: 0.2 X10E3/UL (ref 0–0.4)
EOSINOPHIL NFR BLD AUTO: 3 %
ERYTHROCYTE [DISTWIDTH] IN BLOOD BY AUTOMATED COUNT: 12.9 % (ref 11.7–15.4)
GLOBULIN SER CALC-MCNC: 2.5 G/DL (ref 1.5–4.5)
GLUCOSE SERPL-MCNC: 89 MG/DL (ref 65–99)
HCT VFR BLD AUTO: 38.4 % (ref 34–46.6)
HDLC SERPL-MCNC: 47 MG/DL
HGB BLD-MCNC: 13.5 G/DL (ref 11.1–15.9)
IMM GRANULOCYTES # BLD AUTO: 0 X10E3/UL (ref 0–0.1)
IMM GRANULOCYTES NFR BLD AUTO: 0 %
LDLC SERPL CALC-MCNC: 119 MG/DL (ref 0–99)
LYMPHOCYTES # BLD AUTO: 1.8 X10E3/UL (ref 0.7–3.1)
LYMPHOCYTES NFR BLD AUTO: 34 %
MCH RBC QN AUTO: 31.6 PG (ref 26.6–33)
MCHC RBC AUTO-ENTMCNC: 35.2 G/DL (ref 31.5–35.7)
MCV RBC AUTO: 90 FL (ref 79–97)
MONOCYTES # BLD AUTO: 0.5 X10E3/UL (ref 0.1–0.9)
MONOCYTES NFR BLD AUTO: 9 %
NEUTROPHILS # BLD AUTO: 2.9 X10E3/UL (ref 1.4–7)
NEUTROPHILS NFR BLD AUTO: 53 %
PLATELET # BLD AUTO: 262 X10E3/UL (ref 150–450)
POTASSIUM SERPL-SCNC: 3.7 MMOL/L (ref 3.5–5.2)
PROT SERPL-MCNC: 6.6 G/DL (ref 6–8.5)
RBC # BLD AUTO: 4.27 X10E6/UL (ref 3.77–5.28)
SODIUM SERPL-SCNC: 139 MMOL/L (ref 134–144)
TRIGL SERPL-MCNC: 172 MG/DL (ref 0–149)
TSH SERPL DL<=0.005 MIU/L-ACNC: 3.03 UIU/ML (ref 0.45–4.5)
VLDLC SERPL CALC-MCNC: 30 MG/DL (ref 5–40)
WBC # BLD AUTO: 5.3 X10E3/UL (ref 3.4–10.8)

## 2022-09-16 NOTE — TELEPHONE ENCOUNTER
----- Message from Jesse Renteria MD sent at 9/16/2022  1:27 PM EDT -----  Please notify patient that labs are stable/looked ok.

## 2022-11-08 ENCOUNTER — OFFICE VISIT (OUTPATIENT)
Dept: FAMILY MEDICINE CLINIC | Facility: CLINIC | Age: 62
End: 2022-11-08

## 2022-11-08 VITALS
DIASTOLIC BLOOD PRESSURE: 92 MMHG | BODY MASS INDEX: 30.15 KG/M2 | WEIGHT: 181.2 LBS | HEART RATE: 68 BPM | SYSTOLIC BLOOD PRESSURE: 150 MMHG | TEMPERATURE: 97.8 F | RESPIRATION RATE: 18 BRPM | OXYGEN SATURATION: 99 %

## 2022-11-08 DIAGNOSIS — L03.116 CELLULITIS OF LEFT LOWER EXTREMITY: Primary | ICD-10-CM

## 2022-11-08 PROBLEM — Z90.79 HISTORY OF TOTAL HYSTERECTOMY WITH BILATERAL SALPINGO-OOPHORECTOMY (BSO): Status: ACTIVE | Noted: 2022-11-08

## 2022-11-08 PROBLEM — Z87.42 PERSONAL HISTORY OF ENDOMETRIOSIS: Status: ACTIVE | Noted: 2022-11-08

## 2022-11-08 PROBLEM — Z90.722 HISTORY OF TOTAL HYSTERECTOMY WITH BILATERAL SALPINGO-OOPHORECTOMY (BSO): Status: ACTIVE | Noted: 2022-11-08

## 2022-11-08 PROBLEM — N95.1 MENOPAUSAL SYMPTOMS: Status: ACTIVE | Noted: 2022-11-08

## 2022-11-08 PROBLEM — Z90.710 HISTORY OF TOTAL HYSTERECTOMY WITH BILATERAL SALPINGO-OOPHORECTOMY (BSO): Status: ACTIVE | Noted: 2022-11-08

## 2022-11-08 PROCEDURE — 90686 IIV4 VACC NO PRSV 0.5 ML IM: CPT | Performed by: FAMILY MEDICINE

## 2022-11-08 PROCEDURE — 90471 IMMUNIZATION ADMIN: CPT | Performed by: FAMILY MEDICINE

## 2022-11-08 PROCEDURE — 99213 OFFICE O/P EST LOW 20 MIN: CPT | Performed by: FAMILY MEDICINE

## 2022-11-08 RX ORDER — EPINEPHRINE 0.3 MG/.3ML
INJECTION, SOLUTION INTRAMUSCULAR
COMMUNITY
Start: 2022-10-11

## 2022-11-08 RX ORDER — CLINDAMYCIN HYDROCHLORIDE 300 MG/1
300 CAPSULE ORAL 3 TIMES DAILY
Qty: 42 CAPSULE | Refills: 0 | Status: SHIPPED | OUTPATIENT
Start: 2022-11-08 | End: 2022-11-22

## 2022-11-08 NOTE — PROGRESS NOTES
Subjective   Irma Dias is a 61 y.o. female.   Chief Complaint   Patient presents with   • Leg Swelling       History of Present Illness  Recurrent cellulitis left lower leg.   Has appt with infectious disease in 1 week   Leg Swelling  This is a recurrent problem. The current episode started in the past 7 days. The problem occurs constantly. Associated symptoms include fatigue. Pertinent negatives include no abdominal pain, chest pain, fever, joint swelling, nausea, neck pain, numbness, rash, swollen glands, vomiting or weakness. Associated symptoms comments: Drainage from leg. The symptoms are aggravated by walking, standing and bending. Treatments tried: soaking in epsom salt, The treatment provided mild relief.        The following portions of the patient's history were reviewed and updated as appropriate: allergies, current medications, past family history, past medical history, past social history, past surgical history and problem list.    Patient Active Problem List   Diagnosis   • Benign hypertension   • Allergic rhinitis   • Hypothyroidism   • Mild intermittent asthma without complication   • Menopausal syndrome   • Acute reaction to stress   • Aortic valve stenosis   • Mixed hyperlipidemia   • Gilbert's syndrome   • Genital herpes simplex   • Acute non-recurrent maxillary sinusitis   • Astigmatism   • Chronic maxillary sinusitis   • Class 1 obesity due to excess calories with serious comorbidity and body mass index (BMI) of 30.0 to 30.9 in adult   • Personal history of endometriosis   • Menopausal symptoms   • History of total hysterectomy with bilateral salpingo-oophorectomy (BSO)       Current Outpatient Medications on File Prior to Visit   Medication Sig Dispense Refill   • Auvi-Q 0.3 MG/0.3ML solution auto-injector injection      • azelastine (ASTELIN) 0.1 % nasal spray      • Cholecalciferol (VITAMIN D3) 57813 units tablet Take 1 tablet by mouth 3 (Three) Times a Week.     • clobetasol (TEMOVATE)  0.05 % cream      • Estradiol-Estriol-Progesterone (BIEST/PROGESTERONE TD) DISSOLVE ONE JONATHAN BETWEEN CHEEK AND TEETH TWICE DAILY. ALLOW TO DISSOLVE SLOWLY  4   • levothyroxine (SYNTHROID, LEVOTHROID) 100 MCG tablet Take 1 tablet by mouth Daily. 90 tablet 3   • losartan-hydrochlorothiazide (HYZAAR) 50-12.5 MG per tablet Take 1 tablet by mouth Daily. 90 tablet 3   • montelukast (SINGULAIR) 10 MG tablet Take 1 tablet by mouth Daily With Breakfast. 90 tablet 3   • Progesterone 40 % cream 75mg- 0.25mg cream  5     No current facility-administered medications on file prior to visit.     Current outpatient and discharge medications have been reconciled for the patient.  Reviewed by: Jesse Renteria MD      Allergies   Allergen Reactions   • Codeine Nausea Only and Hallucinations   • Penicillin V Potassium Rash   • Sulfamethoxazole-Trimethoprim Rash   • Cefprozil Itching   • Gabapentin Hallucinations   • Lisinopril Cough   • Sulfa Antibiotics Hallucinations   • Penicillins Rash       Review of Systems   Constitutional: Positive for fatigue. Negative for activity change, appetite change and fever.   HENT: Negative for ear pain, swollen glands and voice change.    Eyes: Negative for visual disturbance.   Respiratory: Negative for shortness of breath and wheezing.    Cardiovascular: Negative for chest pain and leg swelling.   Gastrointestinal: Negative for abdominal pain, blood in stool, constipation, diarrhea, nausea and vomiting.   Endocrine: Negative for polydipsia and polyuria.   Genitourinary: Negative for dysuria, frequency and hematuria.   Musculoskeletal: Negative for joint swelling, neck pain and neck stiffness.   Skin: Positive for wound (shallow ulcerations to left lower leg ). Negative for rash.   Neurological: Negative for weakness, numbness and headache.   Psychiatric/Behavioral: Negative for suicidal ideas and depressed mood.     I have reviewed and confirmed the accuracy of the ROS as documented by the  MA/LPN/RN Jesse Renteria MD    Objective   Visit Vitals  /92 (BP Location: Right arm, Patient Position: Sitting, Cuff Size: Adult)   Pulse 68   Temp 97.8 °F (36.6 °C)   Resp 18   Wt 82.2 kg (181 lb 3.2 oz)   SpO2 99%   BMI 30.15 kg/m²       Physical Exam  Constitutional:       Appearance: She is well-developed.   HENT:      Head: Normocephalic and atraumatic.      Right Ear: External ear normal.      Left Ear: External ear normal.      Nose: Nose normal.   Eyes:      Pupils: Pupils are equal, round, and reactive to light.   Cardiovascular:      Rate and Rhythm: Normal rate and regular rhythm.      Heart sounds: Normal heart sounds.   Pulmonary:      Effort: Pulmonary effort is normal.      Breath sounds: Normal breath sounds.   Abdominal:      General: Bowel sounds are normal.      Palpations: Abdomen is soft.   Musculoskeletal:         General: Normal range of motion.      Cervical back: Normal range of motion and neck supple.   Skin:     General: Skin is warm and dry.   Neurological:      Mental Status: She is alert and oriented to person, place, and time.   Psychiatric:         Behavior: Behavior normal.         Thought Content: Thought content normal.         Judgment: Judgment normal.       Physical Exam   Lower extremities           Diagnoses and all orders for this visit:    1. Cellulitis of left lower extremity (Primary)  -     clindamycin (CLEOCIN) 300 MG capsule; Take 1 capsule by mouth 3 (Three) Times a Day for 14 days.  Dispense: 42 capsule; Refill: 0  -     Culture, Routine - Swab, Leg, Left    Other orders  -     FluLaval/Fluarix/Fluzone >6 Months     Findings discussed. All questions answered.  Medication and medication adverse effects discussed.  Drug education given and explained to patient. Patient verbalized understanding.   Keep ID eval next week.  Follow-up in 2 weeks if not better.  Follow-up sooner for worsening symptoms or for any concerns.      Expected course, medications, and  adverse effects discussed as appropriate.  Call or return if worsening or persistent symptoms.  I wore protective equipment throughout this patient encounter to include mask and eye protection. Hand hygiene was performed before donning protective equipment and after removal when leaving the room.       This document is intended for medical professional use only.

## 2022-11-09 ENCOUNTER — DOCUMENTATION (OUTPATIENT)
Dept: FAMILY MEDICINE CLINIC | Facility: CLINIC | Age: 62
End: 2022-11-09

## 2022-11-10 LAB
BACTERIA SPEC CULT: NORMAL
MICROORGANISM/AGENT SPEC: NORMAL

## 2023-01-03 ENCOUNTER — OFFICE VISIT (OUTPATIENT)
Dept: FAMILY MEDICINE CLINIC | Facility: CLINIC | Age: 63
End: 2023-01-03
Payer: COMMERCIAL

## 2023-01-03 VITALS
RESPIRATION RATE: 18 BRPM | HEIGHT: 65 IN | OXYGEN SATURATION: 98 % | DIASTOLIC BLOOD PRESSURE: 90 MMHG | WEIGHT: 182.6 LBS | TEMPERATURE: 97.7 F | SYSTOLIC BLOOD PRESSURE: 146 MMHG | HEART RATE: 82 BPM | BODY MASS INDEX: 30.42 KG/M2

## 2023-01-03 DIAGNOSIS — E66.09 CLASS 1 OBESITY DUE TO EXCESS CALORIES WITH SERIOUS COMORBIDITY AND BODY MASS INDEX (BMI) OF 30.0 TO 30.9 IN ADULT: ICD-10-CM

## 2023-01-03 DIAGNOSIS — L03.116 CELLULITIS OF LEFT LOWER EXTREMITY: Primary | ICD-10-CM

## 2023-01-03 PROCEDURE — 99213 OFFICE O/P EST LOW 20 MIN: CPT | Performed by: FAMILY MEDICINE

## 2023-01-03 RX ORDER — MELOXICAM 15 MG/1
1 TABLET ORAL DAILY
COMMUNITY
Start: 2022-12-15

## 2023-01-03 RX ORDER — CLINDAMYCIN HYDROCHLORIDE 300 MG/1
300 CAPSULE ORAL 3 TIMES DAILY
Qty: 42 CAPSULE | Refills: 0 | Status: SHIPPED | OUTPATIENT
Start: 2023-01-03 | End: 2023-01-17

## 2023-01-03 NOTE — PROGRESS NOTES
Subjective   Irma Dias is a 62 y.o. female.   Chief Complaint   Patient presents with   • Pre-op Exam       History of Present Illness  Patient is here today for pre-op of her right hip. Patient is scheduled for right hip replacement on 1/17/23 at [facility] in [place].    Follow up left leg cellulitis         The following portions of the patient's history were reviewed and updated as appropriate: allergies, current medications, past family history, past medical history, past social history, past surgical history and problem list.    Patient Active Problem List   Diagnosis   • Benign hypertension   • Allergic rhinitis   • Hypothyroidism   • Mild intermittent asthma without complication   • Menopausal syndrome   • Acute reaction to stress   • Aortic valve stenosis   • Mixed hyperlipidemia   • Gilbert's syndrome   • Genital herpes simplex   • Acute non-recurrent maxillary sinusitis   • Astigmatism   • Chronic maxillary sinusitis   • Class 1 obesity due to excess calories with serious comorbidity and body mass index (BMI) of 30.0 to 30.9 in adult   • Personal history of endometriosis   • Menopausal symptoms   • History of total hysterectomy with bilateral salpingo-oophorectomy (BSO)       Current Outpatient Medications on File Prior to Visit   Medication Sig Dispense Refill   • Auvi-Q 0.3 MG/0.3ML solution auto-injector injection      • azelastine (ASTELIN) 0.1 % nasal spray      • Cholecalciferol (VITAMIN D3) 02284 units tablet Take 1 tablet by mouth 3 (Three) Times a Week.     • clobetasol (TEMOVATE) 0.05 % cream      • Estradiol-Estriol-Progesterone (BIEST/PROGESTERONE TD) DISSOLVE ONE JONATHAN BETWEEN CHEEK AND TEETH TWICE DAILY. ALLOW TO DISSOLVE SLOWLY  4   • levothyroxine (SYNTHROID, LEVOTHROID) 100 MCG tablet Take 1 tablet by mouth Daily. 90 tablet 3   • losartan-hydrochlorothiazide (HYZAAR) 50-12.5 MG per tablet Take 1 tablet by mouth Daily. 90 tablet 3   • meloxicam (MOBIC) 15 MG  tablet Take 1 tablet by mouth Daily.     • montelukast (SINGULAIR) 10 MG tablet Take 1 tablet by mouth Daily With Breakfast. 90 tablet 3   • Progesterone 40 % cream 75mg- 0.25mg cream  5     No current facility-administered medications on file prior to visit.     Current outpatient and discharge medications have been reconciled for the patient.  Reviewed by: Jesse Renteria MD      Allergies   Allergen Reactions   • Codeine Nausea Only and Hallucinations   • Penicillin V Potassium Rash   • Sulfamethoxazole-Trimethoprim Rash   • Cefprozil Itching   • Gabapentin Hallucinations   • Lisinopril Cough   • Sulfa Antibiotics Hallucinations   • Penicillins Rash       Review of Systems   Constitutional: Negative for activity change, appetite change, fatigue and fever.   HENT: Negative for ear pain, swollen glands and voice change.    Eyes: Negative for visual disturbance.   Respiratory: Negative for shortness of breath and wheezing.    Cardiovascular: Negative for chest pain and leg swelling.   Gastrointestinal: Negative for abdominal pain, blood in stool, constipation, diarrhea, nausea and vomiting.   Endocrine: Negative for polydipsia and polyuria.   Genitourinary: Negative for dysuria, frequency and hematuria.   Musculoskeletal: Negative for joint swelling, neck pain and neck stiffness.   Skin: Negative for rash and wound.   Neurological: Negative for weakness, numbness and headache.   Psychiatric/Behavioral: Negative for suicidal ideas and depressed mood.     I have reviewed and confirmed the accuracy of the ROS as documented by the MA/LPN/RN Jesse Renteria MD    Objective   Visit Vitals  /90 (BP Location: Left arm, Patient Position: Sitting, Cuff Size: Adult)   Pulse 82   Temp 97.7 °F (36.5 °C)   Resp 18   Ht 165.1 cm (65\")   Wt 82.8 kg (182 lb 9.6 oz)   SpO2 98%   BMI 30.39 kg/m²       Physical Exam  Constitutional:       Appearance: She is well-developed.   HENT:      Head: Normocephalic and  atraumatic.      Right Ear: External ear normal.      Left Ear: External ear normal.      Nose: Nose normal.   Eyes:      Pupils: Pupils are equal, round, and reactive to light.   Cardiovascular:      Rate and Rhythm: Normal rate and regular rhythm.      Heart sounds: Normal heart sounds.   Pulmonary:      Effort: Pulmonary effort is normal.      Breath sounds: Normal breath sounds.   Abdominal:      General: Bowel sounds are normal.      Palpations: Abdomen is soft.   Musculoskeletal:         General: Normal range of motion.      Cervical back: Normal range of motion and neck supple.   Skin:     General: Skin is warm and dry.             Comments: Residual erythema, much improved.    Neurological:      Mental Status: She is alert and oriented to person, place, and time.   Psychiatric:         Behavior: Behavior normal.         Thought Content: Thought content normal.         Judgment: Judgment normal.       Derm Physical Exam    Diagnoses and all orders for this visit:    1. Cellulitis of left lower extremity (Primary)  Comments:  Mostly resolved. Recommed continue atbx prophylactically for now given upcoming surgery.  Orders:  -     clindamycin (CLEOCIN) 300 MG capsule; Take 1 capsule by mouth 3 (Three) Times a Day for 14 days.  Dispense: 42 capsule; Refill: 0  -     mupirocin (BACTROBAN) 2 % ointment; Apply 1 application topically to the appropriate area as directed 3 (Three) Times a Day.  Dispense: 15 g; Refill: 0    2. Class 1 obesity due to excess calories with serious comorbidity and body mass index (BMI) of 30.0 to 30.9 in adult     Findings discussed. All questions answered.  Medication and medication adverse effects discussed.  Drug education given and explained to patient. Patient verbalized understanding.    Expected course, medications, and adverse effects discussed as appropriate.  Call or return if worsening or persistent symptoms.  I wore protective equipment throughout this patient encounter to  include mask and eye protection. Hand hygiene was performed before donning protective equipment and after removal when leaving the room.       This document is intended for medical professional use only.

## 2023-05-25 ENCOUNTER — TELEPHONE (OUTPATIENT)
Dept: FAMILY MEDICINE CLINIC | Facility: CLINIC | Age: 63
End: 2023-05-25
Payer: COMMERCIAL

## 2023-05-25 DIAGNOSIS — E03.9 HYPOTHYROIDISM, UNSPECIFIED TYPE: ICD-10-CM

## 2023-05-25 DIAGNOSIS — I10 BENIGN HYPERTENSION: ICD-10-CM

## 2023-05-25 DIAGNOSIS — J30.9 ALLERGIC RHINITIS, UNSPECIFIED SEASONALITY, UNSPECIFIED TRIGGER: ICD-10-CM

## 2023-05-25 RX ORDER — LOSARTAN POTASSIUM AND HYDROCHLOROTHIAZIDE 12.5; 5 MG/1; MG/1
1 TABLET ORAL DAILY
Qty: 90 TABLET | Refills: 3 | Status: SHIPPED | OUTPATIENT
Start: 2023-05-25

## 2023-05-25 RX ORDER — MONTELUKAST SODIUM 10 MG/1
10 TABLET ORAL
Qty: 90 TABLET | Refills: 3 | Status: SHIPPED | OUTPATIENT
Start: 2023-05-25

## 2023-05-25 RX ORDER — LEVOTHYROXINE SODIUM 0.1 MG/1
100 TABLET ORAL DAILY
Qty: 90 TABLET | Refills: 3 | Status: SHIPPED | OUTPATIENT
Start: 2023-05-25

## 2023-05-25 NOTE — TELEPHONE ENCOUNTER
Hub staff attempted to follow warm transfer process and was unsuccessful     Caller: Irma Dias    Relationship to patient: Self    Best call back number:  [phone] (Mobile)    Patient is needing: [facility] PHYSICAL   CAN DO BEFORE 6-12 -23

## 2023-05-25 NOTE — TELEPHONE ENCOUNTER
Caller:      Irma Dias (Self) [phone] (Mobile)         Requested Prescriptions:   Requested Prescriptions     Pending Prescriptions Disp Refills   • losartan-hydrochlorothiazide (HYZAAR) 50-12.5 MG per tablet 90 tablet 3     Sig: Take 1 tablet by mouth Daily.   • levothyroxine (SYNTHROID, LEVOTHROID) 100 MCG tablet 90 tablet 3     Sig: Take 1 tablet by mouth Daily.   • montelukast (SINGULAIR) 10 MG tablet 90 tablet 3     Sig: Take 1 tablet by mouth Daily With Breakfast.        Pharmacy where request should be sent: "[id number] - [place] [address] AT [facility] [address] [facility] - [phone]  - [phone] FX     Last office visit with prescribing clinician: 1/3/2023   Last telemedicine visit with prescribing clinician: Visit date not found   Next office visit with prescribing clinician: Visit date not found     Additional details provided by patient:     Does the patient have less than a 3 day supply:  [] Yes  [x] No    Would you like a call back once the refill request has been completed: [] Yes [] No    If the office needs to give you a call back, can they leave a voicemail: [] Yes [] No    Gary Xavier Rep   05/25/23 09:29 EDT

## 2023-06-01 NOTE — PROGRESS NOTES
Medical Examination    Subjective   Irma Dias is a 62 y.o. female who presents today for a  fitness determination physical exam. The patient reports no problems.  The following portions of the patient's history were reviewed and updated as appropriate: allergies, current medications, past family history, past medical history, past social history, past surgical history and problem list.  Review of Systems  A comprehensive review of systems was negative.    Objective    Vision:  Vision Screening    Right eye Left eye Both eyes   Without correction      With correction 20/20 20/20 20/20       Applicant can recognize and distinguish among traffic control signals and devices showing standard red, green, and ojdee colors.  Applicant has peripheral vision to 90 degrees in each eye.         Monocular Vision?: No      Hearing:  Applicant can distinguish forced whisper at a distance of 5 feet with both ears.         Physical Exam  Constitutional:       Appearance: She is well-developed.   HENT:      Head: Normocephalic and atraumatic.      Right Ear: External ear normal.      Left Ear: External ear normal.      Nose: Nose normal.   Eyes:      Pupils: Pupils are equal, round, and reactive to light.   Cardiovascular:      Rate and Rhythm: Normal rate and regular rhythm.      Heart sounds: Normal heart sounds.   Pulmonary:      Effort: Pulmonary effort is normal.      Breath sounds: Normal breath sounds.   Abdominal:      General: Bowel sounds are normal.      Palpations: Abdomen is soft.   Musculoskeletal:      Cervical back: Normal range of motion and neck supple.   Skin:     General: Skin is warm and dry.   Neurological:      Mental Status: She is alert and oriented to person, place, and time.   Psychiatric:         Behavior: Behavior normal.         Thought Content: Thought content normal.         Judgment: Judgment normal.          Labs:  Lab Results   Component Value Date    SPECGRAV  1.015 06/02/2023    BILIRUBINUR Negative 06/02/2023    GLUCOSEU Negative 01/11/2019       Assessment & Plan   Healthy female exam.   Meets standards in 47 .41;  qualifies for 2 year certificate.     Medical examiners certificate completed and printed.  Return as needed.

## 2023-06-02 ENCOUNTER — CLINICAL SUPPORT (OUTPATIENT)
Dept: FAMILY MEDICINE CLINIC | Facility: CLINIC | Age: 63
End: 2023-06-02

## 2023-06-02 VITALS
TEMPERATURE: 97.3 F | HEART RATE: 81 BPM | BODY MASS INDEX: 30.66 KG/M2 | WEIGHT: 184 LBS | RESPIRATION RATE: 18 BRPM | DIASTOLIC BLOOD PRESSURE: 82 MMHG | HEIGHT: 65 IN | OXYGEN SATURATION: 98 % | SYSTOLIC BLOOD PRESSURE: 136 MMHG

## 2023-06-02 DIAGNOSIS — Z02.4 ENCOUNTER FOR CDL (COMMERCIAL DRIVING LICENSE) EXAM: Primary | ICD-10-CM

## 2023-06-02 LAB
BILIRUB BLD-MCNC: NEGATIVE MG/DL
CLARITY, POC: CLEAR
COLOR UR: YELLOW
GLUCOSE UR STRIP-MCNC: NEGATIVE MG/DL
KETONES UR QL: NEGATIVE
LEUKOCYTE EST, POC: NEGATIVE
NITRITE UR-MCNC: NEGATIVE MG/ML
PH UR: 6.5 [PH] (ref 5–8)
PROT UR STRIP-MCNC: NEGATIVE MG/DL
RBC # UR STRIP: NEGATIVE /UL
SP GR UR: 1.01 (ref 1–1.03)
UROBILINOGEN UR QL: NORMAL

## 2023-06-02 PROCEDURE — DOTPHY: Performed by: FAMILY MEDICINE

## 2023-06-02 PROCEDURE — 81003 URINALYSIS AUTO W/O SCOPE: CPT | Performed by: FAMILY MEDICINE

## 2023-08-18 ENCOUNTER — OFFICE VISIT (OUTPATIENT)
Dept: FAMILY MEDICINE CLINIC | Facility: CLINIC | Age: 63
End: 2023-08-18
Payer: COMMERCIAL

## 2023-08-18 ENCOUNTER — HOSPITAL ENCOUNTER (OUTPATIENT)
Dept: GENERAL RADIOLOGY | Facility: HOSPITAL | Age: 63
Discharge: HOME | End: 2023-08-18
Admitting: FAMILY MEDICINE
Payer: COMMERCIAL

## 2023-08-18 ENCOUNTER — PATIENT MESSAGE (OUTPATIENT)
Dept: FAMILY MEDICINE CLINIC | Facility: CLINIC | Age: 63
End: 2023-08-18
Payer: COMMERCIAL

## 2023-08-18 VITALS
WEIGHT: 184 LBS | OXYGEN SATURATION: 98 % | TEMPERATURE: 97.8 F | RESPIRATION RATE: 20 BRPM | SYSTOLIC BLOOD PRESSURE: 126 MMHG | HEIGHT: 65 IN | DIASTOLIC BLOOD PRESSURE: 86 MMHG | BODY MASS INDEX: 30.66 KG/M2 | HEART RATE: 81 BPM

## 2023-08-18 DIAGNOSIS — E03.9 HYPOTHYROIDISM, UNSPECIFIED TYPE: ICD-10-CM

## 2023-08-18 DIAGNOSIS — I10 BENIGN HYPERTENSION: ICD-10-CM

## 2023-08-18 DIAGNOSIS — J45.20 MILD INTERMITTENT ASTHMA WITHOUT COMPLICATION: ICD-10-CM

## 2023-08-18 DIAGNOSIS — K20.90 ESOPHAGITIS: ICD-10-CM

## 2023-08-18 DIAGNOSIS — E66.09 CLASS 1 OBESITY DUE TO EXCESS CALORIES WITH SERIOUS COMORBIDITY AND BODY MASS INDEX (BMI) OF 30.0 TO 30.9 IN ADULT: ICD-10-CM

## 2023-08-18 DIAGNOSIS — R55 SYNCOPE, UNSPECIFIED SYNCOPE TYPE: Primary | ICD-10-CM

## 2023-08-18 PROBLEM — I35.0 AORTIC VALVE STENOSIS: Status: RESOLVED | Noted: 2019-07-22 | Resolved: 2023-08-18

## 2023-08-18 PROCEDURE — 93000 ELECTROCARDIOGRAM COMPLETE: CPT | Performed by: FAMILY MEDICINE

## 2023-08-18 PROCEDURE — 71046 X-RAY EXAM CHEST 2 VIEWS: CPT

## 2023-08-18 PROCEDURE — 99214 OFFICE O/P EST MOD 30 MIN: CPT | Performed by: FAMILY MEDICINE

## 2023-08-18 RX ORDER — PANTOPRAZOLE SODIUM 40 MG/1
40 TABLET, DELAYED RELEASE ORAL DAILY
Qty: 90 TABLET | Refills: 3 | Status: SHIPPED | OUTPATIENT
Start: 2023-08-18

## 2023-08-18 NOTE — ASSESSMENT & PLAN NOTE
Patient's (Body mass index is 30.62 kg/mý.) indicates that they are obese (BMI >30) with health conditions that include hypertension . Weight is unchanged. BMI  is above average; BMI management plan is completed. We discussed portion control and increasing exercise.

## 2023-08-18 NOTE — PROGRESS NOTES
Subjective   Irma Dias is a 62 y.o. female.   Chief Complaint   Patient presents with    Hypertension    Shortness of Breath       History of Present Illness  Patient states that she had a reaction to meloxicam. She feels better now, but wanted to be seen to go over this with you.   Shortness of Breath  This is a new problem. The current episode started 1 to 4 weeks ago. The problem occurs daily. The problem has been rapidly worsening. The average episode lasts 2 minutes. Associated symptoms include claudication, headaches, leg pain and syncope. Pertinent negatives include no abdominal pain, chest pain, coryza, ear pain, fever, hemoptysis, leg swelling, neck pain, orthopnea, PND, rash, rhinorrhea, sore throat, sputum production, swollen glands, vomiting or wheezing. The symptoms are aggravated by any activity.      The following portions of the patient's history were reviewed and updated as appropriate: allergies, current medications, past family history, past medical history, past social history, past surgical history, and problem list.    Patient Active Problem List   Diagnosis    Benign hypertension    Allergic rhinitis    Hypothyroidism    Mild intermittent asthma without complication    Menopausal syndrome    Acute reaction to stress    Mixed hyperlipidemia    Gilbert's syndrome    Genital herpes simplex    Acute non-recurrent maxillary sinusitis    Astigmatism    Chronic maxillary sinusitis    Class 1 obesity due to excess calories with serious comorbidity and body mass index (BMI) of 30.0 to 30.9 in adult    Personal history of endometriosis    Menopausal symptoms    History of total hysterectomy with bilateral salpingo-oophorectomy (BSO)       Current Outpatient Medications on File Prior to Visit   Medication Sig Dispense Refill    Cholecalciferol (VITAMIN D3) 03525 units tablet Take 1 tablet by mouth 3 (Three) Times a Week.      Estradiol-Estriol-Progesterone (BIEST/PROGESTERONE TD) DISSOLVE ONE JONATHAN  BETWEEN CHEEK AND TEETH TWICE DAILY. ALLOW TO DISSOLVE SLOWLY  4    levothyroxine (SYNTHROID, LEVOTHROID) 100 MCG tablet Take 1 tablet by mouth Daily. 90 tablet 3    losartan-hydrochlorothiazide (HYZAAR) 50-12.5 MG per tablet Take 1 tablet by mouth Daily. 90 tablet 3    montelukast (SINGULAIR) 10 MG tablet Take 1 tablet by mouth Daily With Breakfast. 90 tablet 3    clobetasol (TEMOVATE) 0.05 % cream  (Patient not taking: Reported on 8/18/2023)      mupirocin (BACTROBAN) 2 % ointment Apply 1 application topically to the appropriate area as directed 3 (Three) Times a Day. (Patient not taking: Reported on 8/18/2023) 15 g 0    [DISCONTINUED] Auvi-Q 0.3 MG/0.3ML solution auto-injector injection       [DISCONTINUED] azelastine (ASTELIN) 0.1 % nasal spray       [DISCONTINUED] meloxicam (MOBIC) 15 MG tablet Take 1 tablet by mouth Daily.      [DISCONTINUED] Progesterone 40 % cream 75mg- 0.25mg cream  5     No current facility-administered medications on file prior to visit.     Current outpatient and discharge medications have been reconciled for the patient.  Reviewed by: Jesse Renteria MD      Allergies   Allergen Reactions    Codeine Nausea Only and Hallucinations    Mobic [Meloxicam] Shortness Of Breath    Penicillin V Potassium Rash    Sulfamethoxazole-Trimethoprim Rash    Cefprozil Itching    Gabapentin Hallucinations    Hydrocodone Nausea And Vomiting    Lisinopril Cough    Sulfa Antibiotics Hallucinations    Valsartan Itching and Nausea And Vomiting    Penicillins Rash       Review of Systems   Constitutional:  Negative for activity change, appetite change, fatigue and fever.   HENT:  Negative for ear pain, rhinorrhea, sore throat, swollen glands and voice change.    Eyes:  Negative for visual disturbance.   Respiratory:  Negative for hemoptysis, sputum production, shortness of breath and wheezing.    Cardiovascular:  Positive for claudication and syncope. Negative for chest pain, orthopnea, leg swelling and  "PND.   Gastrointestinal:  Negative for abdominal pain, blood in stool, constipation, diarrhea, nausea and vomiting.   Endocrine: Negative for polydipsia and polyuria.   Genitourinary:  Negative for dysuria, frequency and hematuria.   Musculoskeletal:  Negative for joint swelling, neck pain and neck stiffness.   Skin:  Negative for rash and wound.   Neurological:  Negative for weakness, numbness and headache.   Psychiatric/Behavioral:  Negative for suicidal ideas and depressed mood.    I have reviewed and confirmed the accuracy of the ROS as documented by the MA/LPN/RN Jesse Renteria MD    Objective   Visit Vitals  /86 (BP Location: Right arm, Patient Position: Sitting, Cuff Size: Large Adult)   Pulse 81   Temp 97.8 øF (36.6 øC) (Temporal)   Resp 20   Ht 165.1 cm (65\")   Wt 83.5 kg (184 lb)   SpO2 98%   BMI 30.62 kg/mý      **  Physical Exam  Constitutional:       Appearance: She is well-developed.   HENT:      Head: Normocephalic and atraumatic.      Right Ear: External ear normal.      Left Ear: External ear normal.      Nose: Nose normal.   Eyes:      Pupils: Pupils are equal, round, and reactive to light.   Cardiovascular:      Rate and Rhythm: Normal rate and regular rhythm.      Heart sounds: Normal heart sounds.   Pulmonary:      Effort: Pulmonary effort is normal.      Breath sounds: Normal breath sounds.   Abdominal:      General: Bowel sounds are normal.      Palpations: Abdomen is soft.   Musculoskeletal:         General: Normal range of motion.      Cervical back: Normal range of motion and neck supple.   Skin:     General: Skin is warm and dry.   Neurological:      Mental Status: She is alert and oriented to person, place, and time.   Psychiatric:         Behavior: Behavior normal.         Thought Content: Thought content normal.         Judgment: Judgment normal.     Derm Physical Exam    ECG 12 Lead    Date/Time: 8/18/2023 10:04 AM  Performed by: Jesse Renteria MD  Authorized by: " Jesse Renteria MD   Previous ECG: no previous ECG available  Rhythm: sinus rhythm  Rate: normal  Conduction: incomplete right bundle branch block  ST Segments: ST segments normal  T Waves: T waves normal  QRS axis: normal  Other: no other findings    Clinical impression: non-specific ECG        Diagnoses and all orders for this visit:    1. Syncope, unspecified syncope type (Primary)  Comments:  normal stress test 2020, consider recheck echo if sx persist  Orders:  -     ECG 12 Lead  -     Holter Monitor - 72 Hour Up To 15 Days; Future    2. Esophagitis  -     pantoprazole (PROTONIX) 40 MG EC tablet; Take 1 tablet by mouth Daily.  Dispense: 90 tablet; Refill: 3    3. Mild intermittent asthma without complication  -     XR Chest PA & Lateral    4. Benign hypertension  -     Comprehensive Metabolic Panel  -     CBC & Differential    5. Hypothyroidism, unspecified type  -     TSH    6. Class 1 obesity due to excess calories with serious comorbidity and body mass index (BMI) of 30.0 to 30.9 in adult  Assessment & Plan:  Patient's (Body mass index is 30.62 kg/mý.) indicates that they are obese (BMI >30) with health conditions that include hypertension . Weight is unchanged. BMI  is above average; BMI management plan is completed. We discussed portion control and increasing exercise.       Findings discussed. All questions answered.  Differential diagnosis discussed.   Follow-up after testing complete, sooner for worsening symptoms or any concerns    BMI is >= 30 and <35. (Class 1 Obesity). The following options were offered after discussion;: weight loss educational material (shared in after visit summary)      Expected course, medications, and adverse effects discussed as appropriate.  Call or return if worsening or persistent symptoms.     This document is intended for medical professional use only.

## 2023-08-19 LAB
ALBUMIN SERPL-MCNC: 4.4 G/DL (ref 3.9–4.9)
ALBUMIN/GLOB SERPL: 1.6 {RATIO} (ref 1.2–2.2)
ALP SERPL-CCNC: 65 IU/L (ref 44–121)
ALT SERPL-CCNC: 14 IU/L (ref 0–32)
AST SERPL-CCNC: 16 IU/L (ref 0–40)
BASOPHILS # BLD AUTO: 0 X10E3/UL (ref 0–0.2)
BASOPHILS NFR BLD AUTO: 1 %
BILIRUB SERPL-MCNC: 2.6 MG/DL (ref 0–1.2)
BUN SERPL-MCNC: 10 MG/DL (ref 8–27)
BUN/CREAT SERPL: 10 (ref 12–28)
CALCIUM SERPL-MCNC: 9.8 MG/DL (ref 8.7–10.3)
CHLORIDE SERPL-SCNC: 99 MMOL/L (ref 96–106)
CO2 SERPL-SCNC: 24 MMOL/L (ref 20–29)
CREAT SERPL-MCNC: 1.05 MG/DL (ref 0.57–1)
EGFRCR SERPLBLD CKD-EPI 2021: 59 ML/MIN/1.73
EOSINOPHIL # BLD AUTO: 0.2 X10E3/UL (ref 0–0.4)
EOSINOPHIL NFR BLD AUTO: 4 %
ERYTHROCYTE [DISTWIDTH] IN BLOOD BY AUTOMATED COUNT: 12.9 % (ref 11.7–15.4)
GLOBULIN SER CALC-MCNC: 2.7 G/DL (ref 1.5–4.5)
GLUCOSE SERPL-MCNC: 91 MG/DL (ref 70–99)
HCT VFR BLD AUTO: 38.6 % (ref 34–46.6)
HGB BLD-MCNC: 13.2 G/DL (ref 11.1–15.9)
IMM GRANULOCYTES # BLD AUTO: 0 X10E3/UL (ref 0–0.1)
IMM GRANULOCYTES NFR BLD AUTO: 0 %
LYMPHOCYTES # BLD AUTO: 1.6 X10E3/UL (ref 0.7–3.1)
LYMPHOCYTES NFR BLD AUTO: 24 %
MCH RBC QN AUTO: 28.5 PG (ref 26.6–33)
MCHC RBC AUTO-ENTMCNC: 34.2 G/DL (ref 31.5–35.7)
MCV RBC AUTO: 83 FL (ref 79–97)
MONOCYTES # BLD AUTO: 0.4 X10E3/UL (ref 0.1–0.9)
MONOCYTES NFR BLD AUTO: 7 %
NEUTROPHILS # BLD AUTO: 4.1 X10E3/UL (ref 1.4–7)
NEUTROPHILS NFR BLD AUTO: 64 %
PLATELET # BLD AUTO: 235 X10E3/UL (ref 150–450)
POTASSIUM SERPL-SCNC: 4 MMOL/L (ref 3.5–5.2)
PROT SERPL-MCNC: 7.1 G/DL (ref 6–8.5)
RBC # BLD AUTO: 4.63 X10E6/UL (ref 3.77–5.28)
SODIUM SERPL-SCNC: 137 MMOL/L (ref 134–144)
TSH SERPL DL<=0.005 MIU/L-ACNC: 1.76 UIU/ML (ref 0.45–4.5)
WBC # BLD AUTO: 6.4 X10E3/UL (ref 3.4–10.8)

## 2023-08-21 ENCOUNTER — PATIENT MESSAGE (OUTPATIENT)
Dept: FAMILY MEDICINE CLINIC | Facility: CLINIC | Age: 63
End: 2023-08-21
Payer: COMMERCIAL

## 2023-08-25 ENCOUNTER — HOSPITAL ENCOUNTER (OUTPATIENT)
Dept: RESPIRATORY THERAPY | Facility: HOSPITAL | Age: 63
Discharge: HOME | End: 2023-08-25
Payer: COMMERCIAL

## 2023-08-25 DIAGNOSIS — R55 SYNCOPE, UNSPECIFIED SYNCOPE TYPE: ICD-10-CM

## 2023-08-25 PROCEDURE — 93242 EXT ECG>48HR<7D RECORDING: CPT

## 2023-08-28 ENCOUNTER — HOSPITAL ENCOUNTER (OUTPATIENT)
Facility: HOSPITAL | Age: 63
Setting detail: OBSERVATION
Discharge: HOME | End: 2023-08-30
Attending: EMERGENCY MEDICINE | Admitting: EMERGENCY MEDICINE
Payer: COMMERCIAL

## 2023-08-28 DIAGNOSIS — R06.09 EXERTIONAL DYSPNEA: Primary | ICD-10-CM

## 2023-08-28 DIAGNOSIS — R00.2 PALPITATIONS: ICD-10-CM

## 2023-08-28 LAB
ALBUMIN SERPL-MCNC: 3.8 G/DL (ref 3.5–5.2)
ALBUMIN/GLOB SERPL: 1.3 G/DL
ALP SERPL-CCNC: 63 U/L (ref 39–117)
ALT SERPL W P-5'-P-CCNC: 11 U/L (ref 1–33)
ANION GAP SERPL CALCULATED.3IONS-SCNC: 12.7 MMOL/L (ref 5–15)
AST SERPL-CCNC: 13 U/L (ref 1–32)
BASOPHILS # BLD AUTO: 0.03 10*3/MM3 (ref 0–0.2)
BASOPHILS NFR BLD AUTO: 0.5 % (ref 0–1.5)
BILIRUB SERPL-MCNC: 1.7 MG/DL (ref 0–1.2)
BUN SERPL-MCNC: 7 MG/DL (ref 8–23)
BUN/CREAT SERPL: 6.5 (ref 7–25)
CALCIUM SPEC-SCNC: 9.4 MG/DL (ref 8.6–10.5)
CHLORIDE SERPL-SCNC: 101 MMOL/L (ref 98–107)
CO2 SERPL-SCNC: 24.3 MMOL/L (ref 22–29)
CREAT SERPL-MCNC: 1.08 MG/DL (ref 0.57–1)
DEPRECATED RDW RBC AUTO: 39.5 FL (ref 37–54)
EGFRCR SERPLBLD CKD-EPI 2021: 57.5 ML/MIN/1.73
EOSINOPHIL # BLD AUTO: 0.36 10*3/MM3 (ref 0–0.4)
EOSINOPHIL NFR BLD AUTO: 6.5 % (ref 0.3–6.2)
ERYTHROCYTE [DISTWIDTH] IN BLOOD BY AUTOMATED COUNT: 13.2 % (ref 12.3–15.4)
GLOBULIN UR ELPH-MCNC: 2.9 GM/DL
GLUCOSE SERPL-MCNC: 110 MG/DL (ref 65–99)
HCT VFR BLD AUTO: 35.6 % (ref 34–46.6)
HGB BLD-MCNC: 12.1 G/DL (ref 12–15.9)
HOLD SPECIMEN: NORMAL
HOLD SPECIMEN: NORMAL
IMM GRANULOCYTES # BLD AUTO: 0.02 10*3/MM3 (ref 0–0.05)
IMM GRANULOCYTES NFR BLD AUTO: 0.4 % (ref 0–0.5)
LYMPHOCYTES # BLD AUTO: 1.81 10*3/MM3 (ref 0.7–3.1)
LYMPHOCYTES NFR BLD AUTO: 32.6 % (ref 19.6–45.3)
MAGNESIUM SERPL-MCNC: 1.8 MG/DL (ref 1.6–2.4)
MCH RBC QN AUTO: 28.7 PG (ref 26.6–33)
MCHC RBC AUTO-ENTMCNC: 34 G/DL (ref 31.5–35.7)
MCV RBC AUTO: 84.6 FL (ref 79–97)
MONOCYTES # BLD AUTO: 0.49 10*3/MM3 (ref 0.1–0.9)
MONOCYTES NFR BLD AUTO: 8.8 % (ref 5–12)
NEUTROPHILS NFR BLD AUTO: 2.85 10*3/MM3 (ref 1.7–7)
NEUTROPHILS NFR BLD AUTO: 51.2 % (ref 42.7–76)
NRBC BLD AUTO-RTO: 0 /100 WBC (ref 0–0.2)
PLATELET # BLD AUTO: 212 10*3/MM3 (ref 140–450)
PMV BLD AUTO: 9.7 FL (ref 6–12)
POTASSIUM SERPL-SCNC: 3.5 MMOL/L (ref 3.5–5.2)
PROT SERPL-MCNC: 6.7 G/DL (ref 6–8.5)
QT INTERVAL: 416 MS
QTC INTERVAL: 449 MS
RBC # BLD AUTO: 4.21 10*6/MM3 (ref 3.77–5.28)
SODIUM SERPL-SCNC: 138 MMOL/L (ref 136–145)
TROPONIN T SERPL HS-MCNC: 8 NG/L
TSH SERPL DL<=0.05 MIU/L-ACNC: 1.66 UIU/ML (ref 0.27–4.2)
WBC NRBC COR # BLD: 5.56 10*3/MM3 (ref 3.4–10.8)
WHOLE BLOOD HOLD COAG: NORMAL
WHOLE BLOOD HOLD SPECIMEN: NORMAL

## 2023-08-28 PROCEDURE — G0378 HOSPITAL OBSERVATION PER HR: HCPCS

## 2023-08-28 PROCEDURE — 80050 GENERAL HEALTH PANEL: CPT

## 2023-08-28 PROCEDURE — 93005 ELECTROCARDIOGRAM TRACING: CPT

## 2023-08-28 PROCEDURE — 93010 ELECTROCARDIOGRAM REPORT: CPT | Performed by: INTERNAL MEDICINE

## 2023-08-28 PROCEDURE — 83735 ASSAY OF MAGNESIUM: CPT

## 2023-08-28 PROCEDURE — 84484 ASSAY OF TROPONIN QUANT: CPT

## 2023-08-28 PROCEDURE — 36415 COLL VENOUS BLD VENIPUNCTURE: CPT

## 2023-08-28 PROCEDURE — 99284 EMERGENCY DEPT VISIT MOD MDM: CPT

## 2023-08-28 RX ORDER — LOSARTAN POTASSIUM 50 MG/1
50 TABLET ORAL
Status: DISCONTINUED | OUTPATIENT
Start: 2023-08-29 | End: 2023-08-30 | Stop reason: HOSPADM

## 2023-08-28 RX ORDER — SODIUM CHLORIDE 0.9 % (FLUSH) 0.9 %
10 SYRINGE (ML) INJECTION EVERY 12 HOURS SCHEDULED
Status: DISCONTINUED | OUTPATIENT
Start: 2023-08-28 | End: 2023-08-30 | Stop reason: HOSPADM

## 2023-08-28 RX ORDER — SODIUM CHLORIDE 0.9 % (FLUSH) 0.9 %
10 SYRINGE (ML) INJECTION AS NEEDED
Status: DISCONTINUED | OUTPATIENT
Start: 2023-08-28 | End: 2023-08-30 | Stop reason: HOSPADM

## 2023-08-28 RX ORDER — ONDANSETRON 2 MG/ML
4 INJECTION INTRAMUSCULAR; INTRAVENOUS EVERY 6 HOURS PRN
Status: DISCONTINUED | OUTPATIENT
Start: 2023-08-28 | End: 2023-08-30 | Stop reason: HOSPADM

## 2023-08-28 RX ORDER — ONDANSETRON 4 MG/1
4 TABLET, FILM COATED ORAL EVERY 6 HOURS PRN
Status: DISCONTINUED | OUTPATIENT
Start: 2023-08-28 | End: 2023-08-30 | Stop reason: HOSPADM

## 2023-08-28 RX ORDER — HYDROCHLOROTHIAZIDE 12.5 MG/1
12.5 TABLET ORAL
Status: DISCONTINUED | OUTPATIENT
Start: 2023-08-29 | End: 2023-08-30 | Stop reason: HOSPADM

## 2023-08-28 RX ORDER — SODIUM CHLORIDE 9 MG/ML
40 INJECTION, SOLUTION INTRAVENOUS AS NEEDED
Status: DISCONTINUED | OUTPATIENT
Start: 2023-08-28 | End: 2023-08-30 | Stop reason: HOSPADM

## 2023-08-28 RX ORDER — PANTOPRAZOLE SODIUM 40 MG/1
40 TABLET, DELAYED RELEASE ORAL DAILY
Status: DISCONTINUED | OUTPATIENT
Start: 2023-08-29 | End: 2023-08-30 | Stop reason: HOSPADM

## 2023-08-28 RX ORDER — MONTELUKAST SODIUM 10 MG/1
10 TABLET ORAL
Status: DISCONTINUED | OUTPATIENT
Start: 2023-08-29 | End: 2023-08-30 | Stop reason: HOSPADM

## 2023-08-28 RX ORDER — NITROGLYCERIN 0.4 MG/1
0.4 TABLET SUBLINGUAL
Status: DISCONTINUED | OUTPATIENT
Start: 2023-08-28 | End: 2023-08-30 | Stop reason: HOSPADM

## 2023-08-28 RX ORDER — PANTOPRAZOLE SODIUM 40 MG/1
40 TABLET, DELAYED RELEASE ORAL DAILY
Status: DISCONTINUED | OUTPATIENT
Start: 2023-08-29 | End: 2023-08-28

## 2023-08-28 RX ORDER — LEVOTHYROXINE SODIUM 0.1 MG/1
100 TABLET ORAL NIGHTLY
Status: DISCONTINUED | OUTPATIENT
Start: 2023-08-29 | End: 2023-08-30 | Stop reason: HOSPADM

## 2023-08-28 RX ADMIN — Medication 10 ML: at 22:53

## 2023-08-28 RX ADMIN — LEVOTHYROXINE SODIUM 100 MCG: 0.1 TABLET ORAL at 23:41

## 2023-08-28 RX ADMIN — PANTOPRAZOLE SODIUM 40 MG: 40 TABLET, DELAYED RELEASE ORAL at 23:41

## 2023-08-28 NOTE — Clinical Note
Hemostasis started on the right radial artery. Manual pressure applied to vessel. Manual pressure was held by СЕРГЕЙ RTDora RITTER. Manual pressure was held for 5 min. Hemostasis achieved successfully.

## 2023-08-28 NOTE — Clinical Note
Hemostasis started on the right brachial vein. Manual pressure applied to vessel. Manual pressure was held by AW, RTR. Manual pressure was held for 5 min. Hemostasis achieved successfully.

## 2023-08-28 NOTE — Clinical Note
Prepped: right brachial and Right Wrist. Prepped with: ChloraPrep. The patient was draped in a sterile fashion.

## 2023-08-29 ENCOUNTER — APPOINTMENT (OUTPATIENT)
Dept: CARDIOLOGY | Facility: HOSPITAL | Age: 63
End: 2023-08-29
Payer: COMMERCIAL

## 2023-08-29 LAB
ANION GAP SERPL CALCULATED.3IONS-SCNC: 11.9 MMOL/L (ref 5–15)
AORTIC DIMENSIONLESS INDEX: 0.6 (DI)
ASCENDING AORTA: 2.9 CM
BH CV ECHO MEAS - ACS: 1.79 CM
BH CV ECHO MEAS - AO MAX PG: 10.1 MMHG
BH CV ECHO MEAS - AO MEAN PG: 5.3 MMHG
BH CV ECHO MEAS - AO ROOT DIAM: 2.6 CM
BH CV ECHO MEAS - AO V2 MAX: 158.5 CM/SEC
BH CV ECHO MEAS - AO V2 VTI: 36.3 CM
BH CV ECHO MEAS - AVA(I,D): 1.79 CM2
BH CV ECHO MEAS - EDV(CUBED): 78.8 ML
BH CV ECHO MEAS - EDV(MOD-SP2): 128 ML
BH CV ECHO MEAS - EDV(MOD-SP4): 97 ML
BH CV ECHO MEAS - EF(MOD-BP): 57.3 %
BH CV ECHO MEAS - EF(MOD-SP2): 60.2 %
BH CV ECHO MEAS - EF(MOD-SP4): 53.6 %
BH CV ECHO MEAS - ESV(CUBED): 20.7 ML
BH CV ECHO MEAS - ESV(MOD-SP2): 51 ML
BH CV ECHO MEAS - ESV(MOD-SP4): 45 ML
BH CV ECHO MEAS - FS: 35.9 %
BH CV ECHO MEAS - IVS/LVPW: 1.04 CM
BH CV ECHO MEAS - IVSD: 1.06 CM
BH CV ECHO MEAS - LAT PEAK E' VEL: 8.1 CM/SEC
BH CV ECHO MEAS - LV MASS(C)D: 148.8 GRAMS
BH CV ECHO MEAS - LV MAX PG: 5.6 MMHG
BH CV ECHO MEAS - LV MEAN PG: 2.9 MMHG
BH CV ECHO MEAS - LV V1 MAX: 118.7 CM/SEC
BH CV ECHO MEAS - LV V1 VTI: 23.2 CM
BH CV ECHO MEAS - LVIDD: 4.3 CM
BH CV ECHO MEAS - LVIDS: 2.7 CM
BH CV ECHO MEAS - LVOT AREA: 2.8 CM2
BH CV ECHO MEAS - LVOT DIAM: 1.89 CM
BH CV ECHO MEAS - LVPWD: 1.01 CM
BH CV ECHO MEAS - MED PEAK E' VEL: 6.3 CM/SEC
BH CV ECHO MEAS - MV A DUR: 0.15 SEC
BH CV ECHO MEAS - MV A MAX VEL: 87.4 CM/SEC
BH CV ECHO MEAS - MV DEC SLOPE: 203.9 CM/SEC2
BH CV ECHO MEAS - MV DEC TIME: 232 MSEC
BH CV ECHO MEAS - MV E MAX VEL: 59.6 CM/SEC
BH CV ECHO MEAS - MV E/A: 0.68
BH CV ECHO MEAS - MV MAX PG: 2.9 MMHG
BH CV ECHO MEAS - MV MEAN PG: 1.17 MMHG
BH CV ECHO MEAS - MV P1/2T: 84.9 MSEC
BH CV ECHO MEAS - MV V2 VTI: 27.7 CM
BH CV ECHO MEAS - MVA(P1/2T): 2.6 CM2
BH CV ECHO MEAS - MVA(VTI): 2.34 CM2
BH CV ECHO MEAS - PA ACC TIME: 0.13 SEC
BH CV ECHO MEAS - PA V2 MAX: 91.2 CM/SEC
BH CV ECHO MEAS - PI END-D VEL: 97.2 CM/SEC
BH CV ECHO MEAS - PULM A REVS VEL: 30.8 CM/SEC
BH CV ECHO MEAS - PULM DIAS VEL: 33.4 CM/SEC
BH CV ECHO MEAS - PULM S/D: 2
BH CV ECHO MEAS - PULM SYS VEL: 66.8 CM/SEC
BH CV ECHO MEAS - RV MAX PG: 2.7 MMHG
BH CV ECHO MEAS - RV V1 MAX: 81.4 CM/SEC
BH CV ECHO MEAS - RV V1 VTI: 21.8 CM
BH CV ECHO MEAS - SV(LVOT): 65 ML
BH CV ECHO MEAS - SV(MOD-SP2): 77 ML
BH CV ECHO MEAS - SV(MOD-SP4): 52 ML
BH CV ECHO MEAS - TAPSE (>1.6): 2.1 CM
BH CV ECHO MEASUREMENTS AVERAGE E/E' RATIO: 8.28
BH CV XLRA - TDI S': 11.1 CM/SEC
BUN SERPL-MCNC: 6 MG/DL (ref 8–23)
BUN/CREAT SERPL: 6.7 (ref 7–25)
CALCIUM SPEC-SCNC: 9 MG/DL (ref 8.6–10.5)
CHLORIDE SERPL-SCNC: 104 MMOL/L (ref 98–107)
CO2 SERPL-SCNC: 25.1 MMOL/L (ref 22–29)
CREAT SERPL-MCNC: 0.9 MG/DL (ref 0.57–1)
DEPRECATED RDW RBC AUTO: 41.6 FL (ref 37–54)
EGFRCR SERPLBLD CKD-EPI 2021: 71.5 ML/MIN/1.73
ERYTHROCYTE [DISTWIDTH] IN BLOOD BY AUTOMATED COUNT: 13.4 % (ref 12.3–15.4)
GLUCOSE SERPL-MCNC: 95 MG/DL (ref 65–99)
HCT VFR BLD AUTO: 34.8 % (ref 34–46.6)
HGB BLD-MCNC: 11.9 G/DL (ref 12–15.9)
LEFT ATRIUM VOLUME INDEX: 26.2 ML/M2
MCH RBC QN AUTO: 29.1 PG (ref 26.6–33)
MCHC RBC AUTO-ENTMCNC: 34.2 G/DL (ref 31.5–35.7)
MCV RBC AUTO: 85.1 FL (ref 79–97)
PLATELET # BLD AUTO: 216 10*3/MM3 (ref 140–450)
PMV BLD AUTO: 9.9 FL (ref 6–12)
POTASSIUM SERPL-SCNC: 3.5 MMOL/L (ref 3.5–5.2)
RBC # BLD AUTO: 4.09 10*6/MM3 (ref 3.77–5.28)
SINUS: 2.8 CM
SODIUM SERPL-SCNC: 141 MMOL/L (ref 136–145)
TROPONIN T SERPL HS-MCNC: 8 NG/L
WBC NRBC COR # BLD: 5.05 10*3/MM3 (ref 3.4–10.8)

## 2023-08-29 PROCEDURE — G0378 HOSPITAL OBSERVATION PER HR: HCPCS

## 2023-08-29 PROCEDURE — C1769 GUIDE WIRE: HCPCS | Performed by: INTERNAL MEDICINE

## 2023-08-29 PROCEDURE — 93460 R&L HRT ART/VENTRICLE ANGIO: CPT | Performed by: INTERNAL MEDICINE

## 2023-08-29 PROCEDURE — 25010000002 MIDAZOLAM PER 1 MG: Performed by: INTERNAL MEDICINE

## 2023-08-29 PROCEDURE — 25510000001 PERFLUTREN (DEFINITY) 8.476 MG IN SODIUM CHLORIDE (PF) 0.9 % 10 ML INJECTION

## 2023-08-29 PROCEDURE — 84484 ASSAY OF TROPONIN QUANT: CPT

## 2023-08-29 PROCEDURE — 93306 TTE W/DOPPLER COMPLETE: CPT

## 2023-08-29 PROCEDURE — C1894 INTRO/SHEATH, NON-LASER: HCPCS | Performed by: INTERNAL MEDICINE

## 2023-08-29 PROCEDURE — 25010000002 HEPARIN (PORCINE) PER 1000 UNITS: Performed by: INTERNAL MEDICINE

## 2023-08-29 PROCEDURE — 25010000002 FENTANYL CITRATE (PF) 50 MCG/ML SOLUTION: Performed by: INTERNAL MEDICINE

## 2023-08-29 PROCEDURE — 82810 BLOOD GASES O2 SAT ONLY: CPT

## 2023-08-29 PROCEDURE — 25510000001 IOPAMIDOL PER 1 ML: Performed by: INTERNAL MEDICINE

## 2023-08-29 PROCEDURE — 85018 HEMOGLOBIN: CPT

## 2023-08-29 PROCEDURE — 99204 OFFICE O/P NEW MOD 45 MIN: CPT | Performed by: INTERNAL MEDICINE

## 2023-08-29 PROCEDURE — 85027 COMPLETE CBC AUTOMATED: CPT

## 2023-08-29 PROCEDURE — 85014 HEMATOCRIT: CPT

## 2023-08-29 PROCEDURE — 80048 BASIC METABOLIC PNL TOTAL CA: CPT

## 2023-08-29 PROCEDURE — 93306 TTE W/DOPPLER COMPLETE: CPT | Performed by: INTERNAL MEDICINE

## 2023-08-29 RX ORDER — HYDROCODONE BITARTRATE AND ACETAMINOPHEN 5; 325 MG/1; MG/1
1 TABLET ORAL EVERY 4 HOURS PRN
Status: DISCONTINUED | OUTPATIENT
Start: 2023-08-29 | End: 2023-08-30 | Stop reason: HOSPADM

## 2023-08-29 RX ORDER — SODIUM CHLORIDE 9 MG/ML
INJECTION, SOLUTION INTRAVENOUS
Status: COMPLETED | OUTPATIENT
Start: 2023-08-29 | End: 2023-08-29

## 2023-08-29 RX ORDER — MIDAZOLAM HYDROCHLORIDE 1 MG/ML
INJECTION INTRAMUSCULAR; INTRAVENOUS
Status: DISCONTINUED | OUTPATIENT
Start: 2023-08-29 | End: 2023-08-29 | Stop reason: HOSPADM

## 2023-08-29 RX ORDER — LIDOCAINE HYDROCHLORIDE 20 MG/ML
INJECTION, SOLUTION INFILTRATION; PERINEURAL
Status: DISCONTINUED | OUTPATIENT
Start: 2023-08-29 | End: 2023-08-29 | Stop reason: HOSPADM

## 2023-08-29 RX ORDER — ONDANSETRON 4 MG/1
4 TABLET, FILM COATED ORAL EVERY 6 HOURS PRN
Status: DISCONTINUED | OUTPATIENT
Start: 2023-08-29 | End: 2023-08-30 | Stop reason: HOSPADM

## 2023-08-29 RX ORDER — SODIUM CHLORIDE 9 MG/ML
50 INJECTION, SOLUTION INTRAVENOUS CONTINUOUS
Status: ACTIVE | OUTPATIENT
Start: 2023-08-29 | End: 2023-08-29

## 2023-08-29 RX ORDER — FENTANYL CITRATE 50 UG/ML
INJECTION, SOLUTION INTRAMUSCULAR; INTRAVENOUS
Status: DISCONTINUED | OUTPATIENT
Start: 2023-08-29 | End: 2023-08-29 | Stop reason: HOSPADM

## 2023-08-29 RX ORDER — ONDANSETRON 2 MG/ML
4 INJECTION INTRAMUSCULAR; INTRAVENOUS EVERY 6 HOURS PRN
Status: DISCONTINUED | OUTPATIENT
Start: 2023-08-29 | End: 2023-08-30 | Stop reason: HOSPADM

## 2023-08-29 RX ORDER — VERAPAMIL HYDROCHLORIDE 2.5 MG/ML
INJECTION, SOLUTION INTRAVENOUS
Status: DISCONTINUED | OUTPATIENT
Start: 2023-08-29 | End: 2023-08-29 | Stop reason: HOSPADM

## 2023-08-29 RX ORDER — ACETAMINOPHEN 325 MG/1
650 TABLET ORAL EVERY 4 HOURS PRN
Status: DISCONTINUED | OUTPATIENT
Start: 2023-08-29 | End: 2023-08-30 | Stop reason: HOSPADM

## 2023-08-29 RX ORDER — HEPARIN SODIUM 1000 [USP'U]/ML
INJECTION, SOLUTION INTRAVENOUS; SUBCUTANEOUS
Status: DISCONTINUED | OUTPATIENT
Start: 2023-08-29 | End: 2023-08-29 | Stop reason: HOSPADM

## 2023-08-29 RX ADMIN — HYDROCHLOROTHIAZIDE 12.5 MG: 12.5 TABLET ORAL at 08:06

## 2023-08-29 RX ADMIN — Medication 10 ML: at 20:34

## 2023-08-29 RX ADMIN — MONTELUKAST SODIUM 10 MG: 10 TABLET, FILM COATED ORAL at 08:06

## 2023-08-29 RX ADMIN — SODIUM CHLORIDE 50 ML/HR: 9 INJECTION, SOLUTION INTRAVENOUS at 12:13

## 2023-08-29 RX ADMIN — LOSARTAN POTASSIUM 50 MG: 50 TABLET, FILM COATED ORAL at 08:06

## 2023-08-29 RX ADMIN — Medication 10 ML: at 08:10

## 2023-08-29 RX ADMIN — PERFLUTREN 2 ML: 6.52 INJECTION, SUSPENSION INTRAVENOUS at 10:10

## 2023-08-29 RX ADMIN — LEVOTHYROXINE SODIUM 100 MCG: 0.1 TABLET ORAL at 20:34

## 2023-08-29 NOTE — DISCHARGE INSTRUCTIONS
[facility]  [address] [place] [address]    Coronary Angiogram (Radial/Ulnar Approach) After Care    Refer to this sheet in the next few weeks. These instructions provide you with information on caring for yourself after your procedure. Your caregiver may also give you more specific instructions. Your treatment has been planned according to current medical practices, but problems sometimes occur. Call your caregiver if you have any problems or questions after your procedure.    Home Care Instructions:  You may shower the day after the procedure. Remove the bandage (dressing) and gently wash the site with plain soap and water. Gently pat the site dry. You may apply a band aid daily for 2 days if desired.    Do not apply powder or lotion to the site.  Do not submerge the affected site in water for 3 to 5 days or until the site is completely healed.   Do not lift, push or pull anything over 5 pounds for 5 days after your procedure or as directed by your physician.  As a reference, a gallon of milk weighs 8 pounds.   Inspect the site at least twice daily. You may notice some bruising at the site and it may be tender for 1 to 2 weeks.     Increase your fluid intake for the next 2 days.    Keep arm elevated for 24 hours. For the remainder of the day, keep your arm in “Pledge of Allegiance” position when up and about.     You may drive 24 hours after the procedure unless otherwise instructed by your caregiver.  Do not operate machinery or power tools for 24 hours.  A responsible adult should be with you for the first 24 hours after you arrive home. Do not make any important legal decisions or sign legal papers for 24 hours.  Do not drink alcohol for 24 hours.    Metformin or any medications containing Metformin should not be taken for 48 hours after your procedure.      Call Your Doctor if:   You have unusual pain at the radial/ulnar (wrist) site.  You have redness, warmth, swelling, or pain at the  radial/ulnar (wrist) site.  You have drainage (other than a small amount of blood on the dressing).  `You have chills or a fever > 101.  Your arm becomes pale or dark, cool, tingly, or numb.  You develop chest pain, shortness of breath, feel faint or pass out.    You have heavy bleeding from the site, hold pressure on the site for 20 minutes.  If the bleeding stops, apply a fresh bandage and call your cardiologist.  However, if you        continue to have bleeding, call 911 and continue to apply pressure to the site.   You have any symptoms of a stroke.  Remember BE FAST  B-balance. Sudden trouble walking or loss of balance.  E-eyes.  Sudden changes in how you see or a sudden onset of a very bad headache.   F-face. Sudden weakness or loss of feeling of the face or facial droop on one side.   A-arms Sudden weakness or numbness in one arm.  One arm drifts down if they are both held out in front of you. This happens suddenly and usually on one side of the body.   S-speech.  Sudden trouble speaking, slurred speech or trouble understanding what are saying.   T-time  Time to call emergency services.  Write down the symptoms and the time they started.

## 2023-08-29 NOTE — ED NOTES
Nursing report ED to floor  Irma Dias  62 y.o.  female    HPI :   Chief Complaint   Patient presents with    Rapid Heart Rate       Admitting doctor:   Leigh Ann Mijares MD    Admitting diagnosis:   The primary encounter diagnosis was Exertional dyspnea. A diagnosis of Palpitations was also pertinent to this visit.    Code status:   Current Code Status       Date Active Code Status Order ID Comments User Context       8/28/2023 2122 CPR (Attempt to Resuscitate) [id number]  Rita Novak, APRN ED        Question Answer    Code Status (Patient has no pulse and is not breathing) CPR (Attempt to Resuscitate)    Medical Interventions (Patient has pulse or is breathing) Full Support                    Allergies:   Codeine, Gabapentin, Metoprolol, Mobic [meloxicam], Penicillin v potassium, Sulfamethoxazole-trimethoprim, Amlodipine besylate, Bystolic [nebivolol hcl], Cefprozil, Hydrocodone, Lisinopril, Sulfa antibiotics, Tizanidine hcl, Valsartan, and Penicillins    Isolation:   No active isolations    Intake and Output  No intake or output data in the 24 hours ending 08/28/23 2200    Weight:   There were no vitals filed for this visit.    Most recent vitals:   Vitals:    08/28/23 1918 08/28/23 2107 08/28/23 2108 08/28/23 2121   BP: 154/88 144/92  144/92   BP Location:    Right arm   Patient Position:    Lying   Pulse:  70  72   Resp:    16   Temp:       SpO2:   96% 97%       Active LDAs/IV Access:   Lines, Drains & Airways       Active LDAs       Name Placement date Placement time Site Days    Peripheral IV 08/28/23 2129 Right;Posterior Hand 08/28/23 2129  Hand  less than 1                    Labs (abnormal labs have a star):   Labs Reviewed   COMPREHENSIVE METABOLIC PANEL - Abnormal; Notable for the following components:       Result Value    Glucose 110 (*)     BUN 7 (*)     Creatinine 1.08 (*)     Total Bilirubin 1.7 (*)     BUN/Creatinine Ratio 6.5 (*)     eGFR 57.5 (*)     All other components within normal limits     Narrative:     GFR Normal >60  Chronic Kidney Disease <60  Kidney Failure <15     CBC WITH AUTO DIFFERENTIAL - Abnormal; Notable for the following components:    Eosinophil % 6.5 (*)     All other components within normal limits   MAGNESIUM - Normal   SINGLE HSTROPONIN T - Normal    Narrative:     High Sensitive Troponin T Reference Range:  <10.0 ng/L- Negative Female for AMI  <15.0 ng/L- Negative Male for AMI  >=10 - Abnormal Female indicating possible myocardial injury.  >=15 - Abnormal Male indicating possible myocardial injury.   Clinicians would have to utilize clinical acumen, EKG, Troponin, and serial changes to determine if it is an Acute Myocardial Infarction or myocardial injury due to an underlying chronic condition.        TSH - Normal   RAINBOW DRAW    Narrative:     The following orders were created for panel order [place] Draw.  Procedure                               Abnormality         Status                     ---------                               -----------         ------                     Green Top (Gel)[[id number]]                                  Final result               Lavender Top[[id number]]                                     Final result               Gold Top - SST[[id number]]                                   Final result               Light Blue Top[[id number]]                                   Final result                 Please view results for these tests on the individual orders.   CBC AND DIFFERENTIAL    Narrative:     The following orders were created for panel order CBC & Differential.  Procedure                               Abnormality         Status                     ---------                               -----------         ------                     CBC Auto Differential[[id number]]        Abnormal            Final result                 Please view results for these tests on the individual orders.   GREEN TOP   LAVENDER TOP   GOLD TOP - SST   LIGHT BLUE TOP        EKG:   ECG 12 Lead Tachycardia   Final Result   HEART RATE= 70  bpm   RR Interval= 857  ms   GA Interval= 140  ms   P Horizontal Axis= 3  deg   P Front Axis= 33  deg   QRSD Interval= 117  ms   QT Interval= 416  ms   QTcB= 449  ms   QRS Axis= 16  deg   T Wave Axis= 50  deg   - ABNORMAL ECG -   Sinus rhythm   Nonspecific intraventricular conduction delay   No change from previous tracing   Electronically Signed By: Andrae Nguyen ([facility] 28-Aug-2023 20:44:19   Date and Time of Study: 2023-08-28 19:15:20      ECG 12 Lead ED Triage Standing Order; Dysrhythmia    (Results Pending)       Meds given in ED:   Medications   sodium chloride 0.9 % flush 10 mL (has no administration in time range)   sodium chloride 0.9 % flush 10 mL (has no administration in time range)   sodium chloride 0.9 % flush 10 mL (has no administration in time range)   sodium chloride 0.9 % infusion 40 mL (has no administration in time range)   ondansetron (ZOFRAN) tablet 4 mg (has no administration in time range)     Or   ondansetron (ZOFRAN) injection 4 mg (has no administration in time range)   nitroglycerin (NITROSTAT) SL tablet 0.4 mg (has no administration in time range)       Imaging results:  XR Chest 2 View    Result Date: 8/28/2023  No evidence of acute disease in the chest. Electronically Signed: Ricki Shipley MD  8/28/2023 9:46 AM EDT  Workstation ID: [id number]     Ambulatory status:   - ambulatory without assistance    Social issues:   Social History     Socioeconomic History    Marital status:    Tobacco Use    Smoking status: Never     Passive exposure: Never    Smokeless tobacco: Never   Vaping Use    Vaping Use: Never used   Substance and Sexual Activity    Alcohol use: No    Drug use: No    Sexual activity: Yes     Partners: Male     Birth control/protection: None       NIH Stroke Scale:       Judi March RN  08/28/23 22:00 EDT

## 2023-08-29 NOTE — PROGRESS NOTES
ED OBSERVATION PROGRESS/DISCHARGE SUMMARY    Date of Admission: 8/28/2023   LOS: 0 days   PCP: Jesse Renteria MD      Subjective:  Reports no chest pain or shortness of breath overnight.  Denies abdominal pain and nausea.    Hospital Outcome:   62-year-old female admitted the observation unit for further evaluation of palpitations, exertional shortness of breath, and 2 separate syncopal episodes.  High-sensitivity troponins have remained negative and EKG showed no acute ischemia.  An echocardiogram is currently pending.  Cardiology has seen and evaluated the patient today with plan for left heart cath.  Patient is in agreement with the plan.    ROS:  General: no fevers, chills  Respiratory: no cough, dyspnea  Cardiovascular: no chest pain, palpitations  Abdomen: No abdominal pain, nausea, vomiting, or diarrhea  Neurologic: No focal weakness    Objective   Physical Exam:  I have reviewed the vital signs.  Temp:  [97.9 °F (36.6 °C)-98.6 °F (37 °C)] 98 °F (36.7 °C)  Heart Rate:  [] 78  Resp:  [16] 16  BP: (109-154)/(78-92) 109/78  General Appearance:  62-year-old female in no acute distress on room air  Head:    Normocephalic, atraumatic  Eyes:    Sclerae anicteric  Neck:   Supple, no mass  Lungs: Clear to auscultation bilaterally, respirations unlabored  Heart: Regular rate and rhythm, S1 and S2 normal, no murmur, rub or gallop  Abdomen:  Soft, non-tender, bowel sounds active, nondistended  Extremities: No clubbing, cyanosis, or edema to lower extremities  Pulses:  2+ and symmetric in distal lower extremities  Skin: No rashes   Neurologic: Oriented x3, Normal strength to extremities    Results Review:    I have reviewed the labs, radiology results and diagnostic studies.    Results from last 7 days   Lab Units 08/29/23  0344   WBC 10*3/mm3 5.05   HEMOGLOBIN g/dL 11.9*   HEMATOCRIT % 34.8   PLATELETS 10*3/mm3 216     Results from last 7 days   Lab Units 08/29/23  0344 08/28/23  1924   SODIUM mmol/L 141  138   POTASSIUM mmol/L 3.5 3.5   CHLORIDE mmol/L 104 101   CO2 mmol/L 25.1 24.3   BUN mg/dL 6* 7*   CREATININE mg/dL 0.90 1.08*   CALCIUM mg/dL 9.0 9.4   BILIRUBIN mg/dL  --  1.7*   ALK PHOS U/L  --  63   ALT (SGPT) U/L  --  11   AST (SGOT) U/L  --  13   GLUCOSE mg/dL 95 110*     Imaging Results (Last 24 Hours)       ** No results found for the last 24 hours. **            I have reviewed the medications.  ---------------------------------------------------------------------------------------------  Assessment & Plan   Assessment/Problem List    Palpitations      Plan:    Near syncope\palpitations\Shortness of breath  -High-sensitivity troponins negative  -EKG no acute ischemia  -Chest x-ray shows no evidence of acute disease  -Echocardiogram pending  -Cardiology consulted and for left heart cath today  -N.p.o.  -Cardiac monitoring      Hypothyroidism  -Continue Synthroid  -TSH 1.660    Disposition: Transfer to Cath Lab, Dr. Parikh    This note will serve as a transfer summary      56 minutes have been spent by [facility] providers in the care of this patient while under observation status.    Appropriate PPE worn during patient encounter.  Hand hygeine performed before and after seeing the patient.      Sandra Robison PA-C 08/29/23 10:42 EDT

## 2023-08-29 NOTE — ED PROVIDER NOTES
EMERGENCY DEPARTMENT ENCOUNTER    Room Number:  39/39  Date seen:  8/28/2023  PCP: Jesse Renteria MD  Historian: Patient, sister at bedside      HPI:  Chief Complaint: Rapid pulse  A complete HPI/ROS/PMH/PSH/SH/FH are unobtainable due to:   Context: Irma Dias is a 62 y.o. female who presents to the ED c/o rapid pulse.  Patient states she has been feeling poorly for about 1 week.  She was seen by primary care provider told him that she was having exertional dyspnea as well as rapid pulse.  She was given a outpatient Holter monitor and referred to Dr. Helio Lares for outpatient evaluation.  Patient states symptoms have been more severe the last couple of days.  She states she had a pulse as high as 180 the other day while working outside.  This lasted for several minutes and then resolved on its own.  She was seen earlier today at urgent care and had x-ray that was normal and EKG and was sent here for further evaluation.  Patient currently denies chest pain or palpitations but states that she is quite short of breath anytime she gets up and exerts herself.  Patient does report history of prior heart catheterization x2 that were both negative.  The last was about 4 5 years ago.      MEDICAL RECORD REVIEW (non ED)  I reviewed prior medical records including urgent care visit from earlier today.  Patient had evaluation including EKG and x-ray that were fairly benign.  Sent here for further evaluation.    PAST MEDICAL HISTORY  Active Ambulatory Problems     Diagnosis Date Noted    Benign hypertension 07/22/2019    Allergic rhinitis 07/22/2019    Hypothyroidism 07/22/2019    Mild intermittent asthma without complication 07/22/2019    Menopausal syndrome 07/22/2019    Acute reaction to stress 07/22/2019    Mixed hyperlipidemia 07/22/2019    Gilbert's syndrome 07/22/2019    Genital herpes simplex 07/22/2019    Acute non-recurrent maxillary sinusitis 11/11/2019    Astigmatism 06/12/2015    Chronic maxillary  sinusitis 02/11/2020    Class 1 obesity due to excess calories with serious comorbidity and body mass index (BMI) of 30.0 to 30.9 in adult 09/15/2022    Personal history of endometriosis 11/08/2022    Menopausal symptoms 11/08/2022    History of total hysterectomy with bilateral salpingo-oophorectomy (BSO) 11/08/2022     Resolved Ambulatory Problems     Diagnosis Date Noted    Ram's esophagus with dysplasia 07/22/2019    Aortic valve stenosis 07/22/2019    Over weight 08/31/2021     Past Medical History:   Diagnosis Date    Aortic stenosis     Asthma     Ram's esophagus          PAST SURGICAL HISTORY  Past Surgical History:   Procedure Laterality Date    BLADDER SURGERY      CHOLECYSTECTOMY      COLONOSCOPY      HYSTERECTOMY      TONSILLECTOMY           FAMILY HISTORY  Family History   Problem Relation Age of Onset    Heart disease Mother     Stomach cancer Father          SOCIAL HISTORY  Social History     Socioeconomic History    Marital status:    Tobacco Use    Smoking status: Never     Passive exposure: Never    Smokeless tobacco: Never   Vaping Use    Vaping Use: Never used   Substance and Sexual Activity    Alcohol use: No    Drug use: No    Sexual activity: Yes     Partners: Male     Birth control/protection: None         ALLERGIES  Codeine, Gabapentin, Metoprolol, Mobic [meloxicam], Penicillin v potassium, Sulfamethoxazole-trimethoprim, Amlodipine besylate, Bystolic [nebivolol hcl], Cefprozil, Hydrocodone, Lisinopril, Sulfa antibiotics, Tizanidine hcl, Valsartan, and Penicillins        REVIEW OF SYSTEMS  Review of Systems   Constitutional:  Positive for fatigue. Negative for fever and unexpected weight change (Patient reports recent weight gain).   Respiratory:  Positive for shortness of breath.    Cardiovascular:  Positive for palpitations. Negative for chest pain.   Neurological:  Positive for light-headedness.   All other systems reviewed and are negative.         PHYSICAL EXAM  ED Triage  Vitals   Temp Heart Rate Resp BP SpO2   08/28/23 1913 08/28/23 1913 08/28/23 1913 08/28/23 1918 08/28/23 1913   98.6 °F (37 °C) 100 16 154/88 99 %      Temp src Heart Rate Source Patient Position BP Location FiO2 (%)   -- -- -- -- --              Physical Exam    GENERAL: Alert female no obvious distress  HENT: nares patent  EYES: no scleral icterus  CV: regular rhythm, regular rate-no murmur  RESPIRATORY: normal effort, clear to auscultation bilaterally  ABDOMEN: soft, nontender to palpation  MUSCULOSKELETAL: no deformity-no segment swelling or tenderness to palpation  NEURO: Strength sensation and coordination are grossly intact.  Speech and mentation are unremarkable  SKIN: warm, dry      Vital signs and nursing notes reviewed.          LAB RESULTS  Recent Results (from the past 24 hour(s))   ECG 12 Lead Tachycardia    Collection Time: 08/28/23  7:15 PM   Result Value Ref Range    QT Interval 416 ms    QTC Interval 449 ms   Comprehensive Metabolic Panel    Collection Time: 08/28/23  7:24 PM    Specimen: Blood   Result Value Ref Range    Glucose 110 (H) 65 - 99 mg/dL    BUN 7 (L) 8 - 23 mg/dL    Creatinine 1.08 (H) 0.57 - 1.00 mg/dL    Sodium 138 136 - 145 mmol/L    Potassium 3.5 3.5 - 5.2 mmol/L    Chloride 101 98 - 107 mmol/L    CO2 24.3 22.0 - 29.0 mmol/L    Calcium 9.4 8.6 - 10.5 mg/dL    Total Protein 6.7 6.0 - 8.5 g/dL    Albumin 3.8 3.5 - 5.2 g/dL    ALT (SGPT) 11 1 - 33 U/L    AST (SGOT) 13 1 - 32 U/L    Alkaline Phosphatase 63 39 - 117 U/L    Total Bilirubin 1.7 (H) 0.0 - 1.2 mg/dL    Globulin 2.9 gm/dL    A/G Ratio 1.3 g/dL    BUN/Creatinine Ratio 6.5 (L) 7.0 - 25.0    Anion Gap 12.7 5.0 - 15.0 mmol/L    eGFR 57.5 (L) >60.0 mL/min/1.73   Magnesium    Collection Time: 08/28/23  7:24 PM    Specimen: Blood   Result Value Ref Range    Magnesium 1.8 1.6 - 2.4 mg/dL   Single High Sensitivity Troponin T    Collection Time: 08/28/23  7:24 PM    Specimen: Blood   Result Value Ref Range    HS Troponin T 8 <10 ng/L    TSH    Collection Time: 08/28/23  7:24 PM    Specimen: Blood   Result Value Ref Range    TSH 1.660 0.270 - 4.200 uIU/mL   Green Top (Gel)    Collection Time: 08/28/23  7:24 PM   Result Value Ref Range    Extra Tube Hold for add-ons.    Lavender Top    Collection Time: 08/28/23  7:24 PM   Result Value Ref Range    Extra Tube hold for add-on    Gold Top - SST    Collection Time: 08/28/23  7:24 PM   Result Value Ref Range    Extra Tube Hold for add-ons.    Light Blue Top    Collection Time: 08/28/23  7:24 PM   Result Value Ref Range    Extra Tube Hold for add-ons.    CBC Auto Differential    Collection Time: 08/28/23  7:24 PM    Specimen: Blood   Result Value Ref Range    WBC 5.56 3.40 - 10.80 10*3/mm3    RBC 4.21 3.77 - 5.28 10*6/mm3    Hemoglobin 12.1 12.0 - 15.9 g/dL    Hematocrit 35.6 34.0 - 46.6 %    MCV 84.6 79.0 - 97.0 fL    MCH 28.7 26.6 - 33.0 pg    MCHC 34.0 31.5 - 35.7 g/dL    RDW 13.2 12.3 - 15.4 %    RDW-SD 39.5 37.0 - 54.0 fl    MPV 9.7 6.0 - 12.0 fL    Platelets 212 140 - 450 10*3/mm3    Neutrophil % 51.2 42.7 - 76.0 %    Lymphocyte % 32.6 19.6 - 45.3 %    Monocyte % 8.8 5.0 - 12.0 %    Eosinophil % 6.5 (H) 0.3 - 6.2 %    Basophil % 0.5 0.0 - 1.5 %    Immature Grans % 0.4 0.0 - 0.5 %    Neutrophils, Absolute 2.85 1.70 - 7.00 10*3/mm3    Lymphocytes, Absolute 1.81 0.70 - 3.10 10*3/mm3    Monocytes, Absolute 0.49 0.10 - 0.90 10*3/mm3    Eosinophils, Absolute 0.36 0.00 - 0.40 10*3/mm3    Basophils, Absolute 0.03 0.00 - 0.20 10*3/mm3    Immature Grans, Absolute 0.02 0.00 - 0.05 10*3/mm3    nRBC 0.0 0.0 - 0.2 /100 WBC       Ordered the above labs and independently interpreted results. My findings will be discussed in the medical decision making section below        RADIOLOGY  XR Chest 2 View    Result Date: 8/28/2023  XR CHEST 2 VW Date of Exam: 8/28/2023 9:19 AM EDT Indication: Shortness of breath Comparison: 8/18/2023 FINDINGS: No focal airspace opacity. No pleural effusion or pneumothorax. Normal heart  and mediastinal contours. Chronic appearing right lateral rib fractures are stable.     No evidence of acute disease in the chest. Electronically Signed: Ricki Shipley MD  8/28/2023 9:46 AM EDT  Workstation ID: [id number]             PROCEDURES  Procedures          MEDICATIONS GIVEN IN ER  Medications   sodium chloride 0.9 % flush 10 mL (has no administration in time range)   sodium chloride 0.9 % flush 10 mL (has no administration in time range)   sodium chloride 0.9 % flush 10 mL (has no administration in time range)   sodium chloride 0.9 % infusion 40 mL (has no administration in time range)   ondansetron (ZOFRAN) tablet 4 mg (has no administration in time range)     Or   ondansetron (ZOFRAN) injection 4 mg (has no administration in time range)   nitroglycerin (NITROSTAT) SL tablet 0.4 mg (has no administration in time range)               MEDICAL DECISION MAKING, PROGRESS, and CONSULTS    All labs have been independently reviewed by me.  All radiology studies have been reviewed by me and I have also reviewed the radiology report.   EKG's independently viewed and interpreted by me.  Discussion below represents my analysis of pertinent findings related to patient's condition, differential diagnosis, treatment plan and final disposition.      Additional sources:  - Discussed/ obtained information from independent historians: Sister at bedside, urgent care note from earlier today    - External (non-ED) record review: Please see documented above    - Chronic or social conditions impacting care: Hypertension, anxiety disorder, age 62    - Shared decision making: I discussed ED evaluation and treatment plan with patient who is in agreement.  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  She states her symptoms are so tough that she cannot really function well.  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    Seen by  primary care provider in urgent care and referred here for further evaluation.    Labs and EKG fairly benign.  X-ray earlier today is also unremarkable.    I discussed with patient that we could discharge home and have her follow-up with cardiology clinic as expedited outpatient but she is uncomfortable with this plan.  She is worried as she cannot drive a schoolbus and has had difficulty with pulse as high as 180 at home.  She is insistent on admission to the observation unit for further evaluation and emergent cardiology evaluation.      Orders placed during this visit:  Orders Placed This Encounter   Procedures    [place] Draw    Comprehensive Metabolic Panel    Magnesium    Single High Sensitivity Troponin T    TSH    CBC Auto Differential    CBC (No Diff)    Basic Metabolic Panel    High Sensitivity Troponin T    NPO Diet NPO Type: Sips with Meds    Diet: Cardiac Diets; Healthy Heart (2-3 Na+); Texture: Regular Texture (IDDSI 7); Fluid Consistency: Thin (IDDSI 0)    Undress & Gown    Intake & Output    Weigh Patient    Oral Care    Place Sequential Compression Device    Maintain Sequential Compression Device    Telemetry - Maintain IV Access    Telemetry - Place Orders & Notify Provider of Results When Patient Experiences Acute Chest Pain, Dysrhythmia or Respiratory Distress    May Be Off Telemetry for Tests    Vital Signs    Pulse Oximetry, Continuous    Up With Assistance    Code Status and Medical Interventions:    Oxygen Therapy- Nasal Cannula; Titrate 1-6 LPM Per SpO2; 90 - 95%    ECG 12 Lead Tachycardia    ECG 12 Lead ED Triage Standing Order; Dysrhythmia    Insert Peripheral IV    Insert Peripheral IV    Initiate ED Observation Status    CBC & Differential    Green Top (Gel)    Lavender Top    Gold Top - SST    Light Blue Top           Differential diagnosis:    Please see as documented below in ED course      Independent interpretation of labs, radiology studies, and discussions with consultants:  ED  Course as of 08/28/23 2141   Mon Aug 28, 2023   2140 Discussed results of testing with patient and sister at bedside.  They are adamant on admission and cardiology consultation.    Spoke with KARIS Novak who will admit to the observation unit on behalf of Dr. Mijares. [DB]      ED Course User Index  [DB] Rogelio Vasquez MD               DIAGNOSIS  Final diagnoses:   Exertional dyspnea   Palpitations         DISPOSITION  Admission            Latest Documented Vital Signs:  As of 21:41 EDT  BP- 144/92 HR- 72 Temp- 98.6 °F (37 °C) O2 sat- 97%              --    Please note that portions of this were completed with a voice recognition program.       Note Disclaimer: At [facility], we believe that sharing information builds trust and better relationships. You are receiving this note because you are receiving care at [facility] or recently visited. It is possible you will see health information before a provider has talked with you about it. This kind of information can be easy to misunderstand. To help you fully understand what it means for your health, we urge you to discuss this note with your provider.             Rogelio Vasquez MD  08/28/23 2141

## 2023-08-29 NOTE — H&P
[facility]   HISTORY AND PHYSICAL    Patient Name: Irma Dias  : 1960  MRN: [id number]  Primary Care Physician:  Jesse Renteria MD  Date of admission: 2023    Subjective   Subjective     Chief Complaint:   Chief Complaint   Patient presents with    Rapid Heart Rate         HPI:    Irma Dias is a 62 y.o. female presented to the emergency department with a complaint of palpitations, exertional shortness of breath, and 2 syncopal episodes.  She states that the palpitations and exertional dyspnea started about 6 months ago, however, she states that she just ignored it and thought it was just because she was overweight.  She has been to her primary care physician as well as the urgent care in the past month.  She had a 72-hour event monitor placed 2 days ago.  She states in the past couple of weeks she feels like the episodes of palpitations and dyspnea on exertion have become much more frequent.  Her last syncopal episode was  when she was out working in her garden.  She states that she feels like she has not recovered from that.  She denies any chest pain, nausea, vomiting but states that she does have diaphoresis with her palpitations intermittently.  She denies any swelling of her feet and ankles, orthopnea, wheezing, fever, chills,,  back pain, or neck pain.  In the emergency department high-sensitivity troponin was 8, creatinine was 1.08, this appears to be baseline for patient.  EKG shows sinus rhythm, nonischemic.    Review of Systems   All systems were reviewed and negative except for: What was mentioned above in the HPI.    Personal History     Past Medical History:   Diagnosis Date    Allergic rhinitis     Aortic stenosis     Asthma     Ram's esophagus     Benign hypertension     Gilbert's syndrome     Hypothyroidism     Menopausal syndrome     Mixed hyperlipidemia        Past Surgical History:   Procedure Laterality Date    BLADDER SURGERY      CHOLECYSTECTOMY       COLONOSCOPY      HYSTERECTOMY      TONSILLECTOMY         Family History: family history includes Heart disease in her mother; Stomach cancer in her father. Otherwise pertinent FHx was reviewed and not pertinent to current issue.    Social History:  reports that she has never smoked. She has never been exposed to tobacco smoke. She has never used smokeless tobacco. She reports that she does not drink alcohol and does not use drugs.    Home Medications:  levothyroxine, losartan-hydrochlorothiazide, montelukast, and pantoprazole    Allergies:  Allergies   Allergen Reactions    Codeine Nausea Only and Hallucinations    Gabapentin Hallucinations    Metoprolol Shortness Of Breath    Mobic [Meloxicam] Shortness Of Breath    Penicillin V Potassium Rash    Sulfamethoxazole-Trimethoprim Rash    Amlodipine Besylate Unknown - Low Severity    Bystolic [Nebivolol Hcl] Headache    Cefprozil Itching    Hydrocodone Nausea And Vomiting    Lisinopril Cough    Sulfa Antibiotics Hallucinations    Tizanidine Hcl Hallucinations    Valsartan Itching and Nausea And Vomiting    Penicillins Rash       Objective   Objective     Vitals:   Temp:  [98 °F (36.7 °C)-98.6 °F (37 °C)] 98.6 °F (37 °C)  Heart Rate:  [] 72  Resp:  [16-18] 16  BP: (144-154)/() 144/92  Physical Exam    Constitutional: Awake, alert   Eyes: PERRLA, sclerae anicteric, no conjunctival injection   HENT: NCAT, mucous membranes moist   Neck: Supple, no thyromegaly, no lymphadenopathy, trachea midline   Respiratory: Clear to auscultation bilaterally, nonlabored respirations    Cardiovascular: RRR, no murmurs, rubs, or gallops, palpable pedal pulses bilaterally   Gastrointestinal: Positive bowel sounds, soft, nontender, nondistended   Musculoskeletal: No bilateral ankle edema, no clubbing or cyanosis to extremities   Psychiatric: Appropriate affect, cooperative   Neurologic: Oriented x 3, strength symmetric in all extremities, Cranial Nerves grossly intact to  confrontation, speech clear   Skin: No rashes     Result Review    Result Review:  I have personally reviewed the results from the time of this admission to 8/28/2023 21:22 EDT and agree with these findings:  [x]  Laboratory list / accordion  []  Microbiology  [x]  Radiology  [x]  EKG/Telemetry   []  Cardiology/Vascular   []  Pathology  [x]  Old records  []  Other:      Assessment & Plan   Assessment / Plan     Brief Patient Summary:  Irma Dias is a 62 y.o. female who was admitted to the observation unit for further evaluation and treatment of her near syncopal episodes, palpitations, and shortness of breath.    Active Hospital Problems:  Active Hospital Problems    Diagnosis     **Palpitations      Plan:   Near syncope\palpitations\Shortness of breath  -Cardiac monitoring   -vital signs every 4 hours  -Continuous pulse ox  -Cardiology consult   -High-sensitivity troponin   -EKG-sinus rhythm, nonischemic  -N.p.o. after midnight     Hypothyroidism  -Continue Synthroid  -TSH 1.660    DVT prophylaxis:  Mechanical DVT prophylaxis orders are present.    CODE STATUS:    Code Status (Patient has no pulse and is not breathing): CPR (Attempt to Resuscitate)  Medical Interventions (Patient has pulse or is breathing): Full Support    Admission Status:  I believe this patient meets observation status.    78 minutes have been spent by [facility] providers in the care of this patient while under observation status.      Appropriate PPE worn during patient encounter.  Hand hygeine performed before and after seeing the patient.      Electronically signed by JETHRO Berry, 08/28/23, 9:22 PM EDT.

## 2023-08-29 NOTE — PROGRESS NOTES
MD Attestation Note    I supervised care provided by the midlevel provider.    The LING and I have discussed this patient's history, physical exam, and treatment plan. I have reviewed the documentation and personally had a face to face interaction with the patient  I affirm the documentation and agree with the treatment and plan.     I provided a substantive portion of the care of the patient.  I personally performed the physical exam in its entirety, and below are my findings.        Subjective  Pt is a 62 y.o. female admitted from Emergency Department for evaluation and treatment of palpitations, near syncope and exertional dyspnea.    Physical Exam  GENERAL: Alert and in NAD, Vitals reviewed  HENT: nares patent  EYES: no scleral icterus  CV: regular rhythm, regular rate-no murmur  RESPIRATORY: normal effort, clear to auscultation bilaterally  ABDOMEN: soft  MUSCULOSKELETAL: no deformity  NEURO: Strength sensation and coordination are grossly intact.  Speech and mentation are unremarkable  SKIN: warm, dry    Assessment/Plan  I discussed tx and evaluation of this patient with KARIS Robison.  Appreciate consultation by Dr. Parikh.  I have not seen his formal note but patient tells me that he has seen her echo and is planning right and left heart cath for further evaluation.  Lab work and x-rays so far benign.

## 2023-08-29 NOTE — PLAN OF CARE
Goal Outcome Evaluation:  Plan of Care Reviewed With: patient        Progress: no change          S/P heart cath with no intervention. Plan to have chest CT and PFT done tomorrow. All vss. No complaints on pain. Remains on RA at 98%. Ambulated in the room independently. Will continue with plan of care.

## 2023-08-29 NOTE — PLAN OF CARE
Goal Outcome Evaluation:      Pt admitted to obs for rapid pulse and exertional dyspnea. Cardiology consulted. Pt reports no chest pain. Troponin and X-ray negative. Denies any SOA or palpitations at this time. Pt is currently wearing an outpatient holter monitor. NSR, A/O x 4.

## 2023-08-29 NOTE — CONSULTS
Date of Consultation: 23    Referral Provider: Leigh Ann Mijares MD     Reason for Consultation: Shortness of breath, palpitations.    Encounter Provider: Usman Parikh MD    Group of Service: [facility]     Patient Name: Irma Dias    :1960    Chief complaint: Shortness of breath, palpitations    History of Present Illness:      This is a very pleasant 62 year-old female with a history of asthma, hypertension, hyperlipidemia, and hypothyroidism.  The patient states that she has had progressively worsening shortness of breath  for approximately 6 months.  This has occurred mainly with exertion, and is worse with exertion, although it has also occurred with rest at times.  She also has had episodes of palpitations where she feels like her heart rate is racing, which can be independent of the shortness of breath.  She recently had a long-term monitor placed by her primary care physician secondary to a syncopal episode which occurred 3 to 4 weeks ago.  She has had some mild chest pressure at times.  However, she has not had severe chest pain.  Her high-sensitivity troponin was normal.  A preliminary review of her echocardiogram shows grade 1 diastolic dysfunction and a normal ejection fraction without valvular disease.        Past Medical History:   Diagnosis Date    Allergic rhinitis     Aortic stenosis     Asthma     Ram's esophagus     Benign hypertension     Gilbert's syndrome     Hypothyroidism     Menopausal syndrome     Mixed hyperlipidemia          Past Surgical History:   Procedure Laterality Date    BLADDER SURGERY      CHOLECYSTECTOMY      COLONOSCOPY      HYSTERECTOMY      TONSILLECTOMY           Allergies   Allergen Reactions    Codeine Nausea Only and Hallucinations    Gabapentin Hallucinations    Metoprolol Shortness Of Breath    Mobic [Meloxicam] Shortness Of Breath    Penicillin V Potassium Rash    Sulfamethoxazole-Trimethoprim Rash    Amlodipine Besylate  Unknown - Low Severity    Bystolic [Nebivolol Hcl] Headache    Cefprozil Itching    Hydrocodone Nausea And Vomiting    Lisinopril Cough    Sulfa Antibiotics Hallucinations    Tizanidine Hcl Hallucinations    Valsartan Itching and Nausea And Vomiting    Penicillins Rash         No current facility-administered medications on file prior to encounter.     Current Outpatient Medications on File Prior to Encounter   Medication Sig Dispense Refill    levothyroxine (SYNTHROID, LEVOTHROID) 100 MCG tablet Take 1 tablet by mouth Daily. (Patient taking differently: Take 1 tablet by mouth Daily. Nightly) 90 tablet 3    losartan-hydrochlorothiazide (HYZAAR) 50-12.5 MG per tablet Take 1 tablet by mouth Daily. 90 tablet 3    montelukast (SINGULAIR) 10 MG tablet Take 1 tablet by mouth Daily With Breakfast. (Patient taking differently: Take 1 tablet by mouth Daily With Breakfast. Nightly) 90 tablet 3    pantoprazole (PROTONIX) 40 MG EC tablet Take 1 tablet by mouth Daily. (Patient taking differently: Take 1 tablet by mouth Daily. Nightly) 90 tablet 3         Social History     Socioeconomic History    Marital status:    Tobacco Use    Smoking status: Never     Passive exposure: Never    Smokeless tobacco: Never   Vaping Use    Vaping Use: Never used   Substance and Sexual Activity    Alcohol use: No    Drug use: No    Sexual activity: Yes     Partners: Male     Birth control/protection: None         Family History   Problem Relation Age of Onset    Heart disease Mother     Stomach cancer Father        REVIEW OF SYSTEMS:   Pertinent positives are noted in the HPI above.  Otherwise, all other systems were reviewed, and are negative.     Objective:     Vitals:    08/28/23 2223 08/29/23 0338 08/29/23 1016 08/29/23 1051   BP: 143/86 109/78 109/78    BP Location: Right arm Right arm     Patient Position: Lying Lying     Pulse: 67 78 78    Resp: 16 16  15   Temp: 97.9 °F (36.6 °C) 98 °F (36.7 °C)     TempSrc: Oral Oral     SpO2: 99%  "99%     Weight: 83.6 kg (184 lb 4.8 oz)  83.5 kg (184 lb)    Height: 165.1 cm (65\")  165.1 cm (65\")      Body mass index is 30.62 kg/m².  Flowsheet Rows      Flowsheet Row First Filed Value   Admission Height 165.1 cm (65\") Documented at 08/28/2023 2223   Admission Weight 83.6 kg (184 lb 4.8 oz) Documented at 08/28/2023 2223             General:    No acute distress, alert and oriented x4, pleasant                   Head:    Normocephalic, atraumatic.   Eyes:          Conjunctivae and sclerae normal, no icterus.   Throat:   No oral lesions, no thrush, oral mucosa moist.    Neck:   Supple, trachea midline.   Lungs:     Clear to auscultation bilaterally     Heart:    Regular rhythm and normal rate.  No murmurs, gallops, or rubs noted.   Abdomen:     Soft, non-tender, non-distended, positive bowel sounds.    Extremities:   No clubbing, cyanosis, or edema.     Pulses:   Pulses palpable and equal bilaterally.    Skin:   No bleeding or rash.   Neuro:   Non-focal.  Moves all extremities well.    Psychiatric:   Normal mood and affect.     Lab Review:                Results from last 7 days   Lab Units 08/29/23  0344   SODIUM mmol/L 141   POTASSIUM mmol/L 3.5   CHLORIDE mmol/L 104   CO2 mmol/L 25.1   BUN mg/dL 6*   CREATININE mg/dL 0.90   GLUCOSE mg/dL 95   CALCIUM mg/dL 9.0     Results from last 7 days   Lab Units 08/29/23  0344 08/28/23  1924   HSTROP T ng/L 8 8     Results from last 7 days   Lab Units 08/29/23  0344   WBC 10*3/mm3 5.05   HEMOGLOBIN g/dL 11.9*   HEMATOCRIT % 34.8   PLATELETS 10*3/mm3 216             Results from last 7 days   Lab Units 08/28/23  1924   MAGNESIUM mg/dL 1.8           EKG (reviewed by me personally):          Assessment:   1.  Progressive and severe shortness of breath  2.  Asthma, currently controlled  3.  Hypertension  4.  Hyperlipidemia  5.  Hypothyroidism  6.  History of Ram's esophagus    Plan:       Again, she has had progressively worsening dyspnea on exertion, although she has had " some rest shortness of breath as well.  She has had some mild chest discomfort at times, although it is independent.  She is currently wearing a monitor for recent episode of syncope, and she also has palpitations where she feels like her heart rate speeds up at times.    I reviewed her echocardiogram preliminarily earlier today.  I do not see major issues.  She has been a normal ejection fraction without significant valvular disease.  She also has grade 1 diastolic dysfunction.  Her right ventricle was not significantly enlarged to suggest a potential pulmonary embolism.  I have recommended proceeding with a right and left heart catheterization at this point secondary to the severity of her symptoms.  I really think that she needs a definitive answer as far as coronary artery disease and potential pulmonary hypertension.  This will also provide a wedge pressure, which may guide with diuresis if needed.  Discussed in detail with the patient, and she was in agreement with the plan.    Thank you very much for this consult.    Stiven Parikh MD

## 2023-08-29 NOTE — ED NOTES
Patient presents with c/o increasing dyspnea on exertion over the past several days. Patient does have a Holter monitor on which was placed last Friday which she is to wear for a week related to shortness of breath and syncope. Denies chest pain but does relate a mild pressure under her left breast. Reports persistent and mild nausea. Also states she has noticed some swelling in both legs and feet which is worse upon waking in the AM.

## 2023-08-29 NOTE — PROGRESS NOTES
Right and left heart cath essentially normal.  Proceed with PFT's and CTA of the chest.    Stiven Parikh MD

## 2023-08-30 ENCOUNTER — APPOINTMENT (OUTPATIENT)
Dept: RESPIRATORY THERAPY | Facility: HOSPITAL | Age: 63
End: 2023-08-30
Payer: COMMERCIAL

## 2023-08-30 ENCOUNTER — APPOINTMENT (OUTPATIENT)
Dept: CT IMAGING | Facility: HOSPITAL | Age: 63
End: 2023-08-30
Payer: COMMERCIAL

## 2023-08-30 ENCOUNTER — READMISSION MANAGEMENT (OUTPATIENT)
Dept: CALL CENTER | Facility: HOSPITAL | Age: 63
End: 2023-08-30
Payer: COMMERCIAL

## 2023-08-30 VITALS
OXYGEN SATURATION: 96 % | RESPIRATION RATE: 15 BRPM | HEIGHT: 65 IN | HEART RATE: 72 BPM | WEIGHT: 184 LBS | SYSTOLIC BLOOD PRESSURE: 114 MMHG | TEMPERATURE: 98.6 F | BODY MASS INDEX: 30.66 KG/M2 | DIASTOLIC BLOOD PRESSURE: 79 MMHG

## 2023-08-30 LAB
ANION GAP SERPL CALCULATED.3IONS-SCNC: 10.6 MMOL/L (ref 5–15)
ANION GAP SERPL CALCULATED.3IONS-SCNC: 13 MMOL/L (ref 5–15)
BUN SERPL-MCNC: 10 MG/DL (ref 8–23)
BUN SERPL-MCNC: 12 MG/DL (ref 8–23)
BUN/CREAT SERPL: 11.5 (ref 7–25)
BUN/CREAT SERPL: 9.5 (ref 7–25)
CALCIUM SPEC-SCNC: 9 MG/DL (ref 8.6–10.5)
CALCIUM SPEC-SCNC: 9.6 MG/DL (ref 8.6–10.5)
CHLORIDE SERPL-SCNC: 100 MMOL/L (ref 98–107)
CHLORIDE SERPL-SCNC: 102 MMOL/L (ref 98–107)
CO2 SERPL-SCNC: 24 MMOL/L (ref 22–29)
CO2 SERPL-SCNC: 25.4 MMOL/L (ref 22–29)
CREAT SERPL-MCNC: 1.04 MG/DL (ref 0.57–1)
CREAT SERPL-MCNC: 1.05 MG/DL (ref 0.57–1)
DEPRECATED RDW RBC AUTO: 40.3 FL (ref 37–54)
EGFRCR SERPLBLD CKD-EPI 2021: 59.5 ML/MIN/1.73
EGFRCR SERPLBLD CKD-EPI 2021: 60.1 ML/MIN/1.73
ERYTHROCYTE [DISTWIDTH] IN BLOOD BY AUTOMATED COUNT: 13.2 % (ref 12.3–15.4)
GLUCOSE SERPL-MCNC: 96 MG/DL (ref 65–99)
GLUCOSE SERPL-MCNC: 96 MG/DL (ref 65–99)
HCT VFR BLD AUTO: 34.6 % (ref 34–46.6)
HCT VFR BLDA CALC: 33 % (ref 38–51)
HCT VFR BLDA CALC: 34 % (ref 38–51)
HCT VFR BLDA CALC: 35 % (ref 38–51)
HGB BLD-MCNC: 11.8 G/DL (ref 12–15.9)
HGB BLDA-MCNC: 11.2 G/DL (ref 12–17)
HGB BLDA-MCNC: 11.6 G/DL (ref 12–17)
HGB BLDA-MCNC: 11.9 G/DL (ref 12–17)
MCH RBC QN AUTO: 28.5 PG (ref 26.6–33)
MCHC RBC AUTO-ENTMCNC: 34.1 G/DL (ref 31.5–35.7)
MCV RBC AUTO: 83.6 FL (ref 79–97)
PLATELET # BLD AUTO: 214 10*3/MM3 (ref 140–450)
PMV BLD AUTO: 10 FL (ref 6–12)
POTASSIUM SERPL-SCNC: 3.4 MMOL/L (ref 3.5–5.2)
POTASSIUM SERPL-SCNC: 3.5 MMOL/L (ref 3.5–5.2)
RBC # BLD AUTO: 4.14 10*6/MM3 (ref 3.77–5.28)
SAO2 % BLDA: 71 % (ref 95–98)
SAO2 % BLDA: 71 % (ref 95–98)
SAO2 % BLDA: 93 % (ref 95–98)
SODIUM SERPL-SCNC: 137 MMOL/L (ref 136–145)
SODIUM SERPL-SCNC: 138 MMOL/L (ref 136–145)
WBC NRBC COR # BLD: 6.21 10*3/MM3 (ref 3.4–10.8)

## 2023-08-30 PROCEDURE — 99239 HOSP IP/OBS DSCHRG MGMT >30: CPT | Performed by: INTERNAL MEDICINE

## 2023-08-30 PROCEDURE — 94060 EVALUATION OF WHEEZING: CPT

## 2023-08-30 PROCEDURE — G0378 HOSPITAL OBSERVATION PER HR: HCPCS

## 2023-08-30 PROCEDURE — 94729 DIFFUSING CAPACITY: CPT

## 2023-08-30 PROCEDURE — 80048 BASIC METABOLIC PNL TOTAL CA: CPT | Performed by: INTERNAL MEDICINE

## 2023-08-30 PROCEDURE — 94726 PLETHYSMOGRAPHY LUNG VOLUMES: CPT

## 2023-08-30 PROCEDURE — 25510000001 IOPAMIDOL PER 1 ML: Performed by: INTERNAL MEDICINE

## 2023-08-30 PROCEDURE — 85027 COMPLETE CBC AUTOMATED: CPT | Performed by: INTERNAL MEDICINE

## 2023-08-30 PROCEDURE — 71275 CT ANGIOGRAPHY CHEST: CPT

## 2023-08-30 RX ORDER — POTASSIUM CHLORIDE 750 MG/1
40 TABLET, FILM COATED, EXTENDED RELEASE ORAL EVERY 4 HOURS
Status: DISPENSED | OUTPATIENT
Start: 2023-08-30 | End: 2023-08-30

## 2023-08-30 RX ORDER — ALBUTEROL SULFATE 2.5 MG/3ML
2.5 SOLUTION RESPIRATORY (INHALATION) ONCE AS NEEDED
Status: COMPLETED | OUTPATIENT
Start: 2023-08-30 | End: 2023-08-30

## 2023-08-30 RX ADMIN — MONTELUKAST SODIUM 10 MG: 10 TABLET, FILM COATED ORAL at 09:47

## 2023-08-30 RX ADMIN — IOPAMIDOL 95 ML: 755 INJECTION, SOLUTION INTRAVENOUS at 15:20

## 2023-08-30 RX ADMIN — LOSARTAN POTASSIUM 50 MG: 50 TABLET, FILM COATED ORAL at 09:47

## 2023-08-30 RX ADMIN — POTASSIUM CHLORIDE 40 MEQ: 750 TABLET, EXTENDED RELEASE ORAL at 09:47

## 2023-08-30 RX ADMIN — Medication 10 ML: at 09:49

## 2023-08-30 RX ADMIN — HYDROCHLOROTHIAZIDE 12.5 MG: 12.5 TABLET ORAL at 09:47

## 2023-08-30 RX ADMIN — PANTOPRAZOLE SODIUM 40 MG: 40 TABLET, DELAYED RELEASE ORAL at 09:47

## 2023-08-30 RX ADMIN — ALBUTEROL SULFATE 2.5 MG: 2.5 SOLUTION RESPIRATORY (INHALATION) at 10:23

## 2023-08-30 NOTE — DISCHARGE SUMMARY
Date of Admission: 8/28/2023    Date of Discharge:  8/30/2023    Discharge Diagnoses:   1.  Progressive shortness of breath, etiology uncertain  2.  Asthma, currently controlled  3.  Hypertension  4.  Hyperlipidemia  5.  Hypothyroidism  6.  History of Ram's esophagus     Procedures Performed:  1.  Echocardiogram on 8/29/2023  2.  Right and left heart catheterization on 8/29/2023  3.  CT angiogram of the chest on 8/30/2023   4.  Pulmonary function tests on 8/30/2023           Hospital Course:     This is a very pleasant 62 year-old female with a history of asthma, hypertension, and hyperlipidemia.  She is also schoolbus .  She presented with gradually worsening dyspnea on exertion for 6 months which has become severe.  She also had a recent syncopal episode which occurred 3 to 4 weeks ago, and she is wearing a long-term monitor from her primary care provider.  She was admitted for further evaluation, and initially underwent an echocardiogram.  This showed an ejection fraction of 60 to 65% with grade 1 diastolic dysfunction.  She did not have significant valvular disease.  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  Her coronary arteries were normal.  Her right-sided pressures were also fairly normal with a mean PA pressure of 25 (which is the upper limits of normal).  Her pulmonary capillary wedge pressure was only 9, and it did not appear that she was volume overloaded.  She did not have evidence of shunting on the oxygen saturation assessment.  Her right radial artery access site and right brachial vein access site both looked good without hematoma or complications.    She underwent a CT angiogram of the chest on 8/30/2023 which showed no evidence of pulmonary embolism or right heart strain.  She had old granulomatous disease and a stable right lower lobe nodule (which has been stable long-term).   There was some mild biapical pleural and parenchymal scarring, but no other major interstitial lung issues.  There was no evidence of pneumonia.  She also had pulmonary function test performed, which are pending at the time of this dictation.  These should be available within the next day after pulmonology reads them.    It does not seem that she has a cardiac cause for her shortness of breath or other symptoms.  She is going to follow-up with her PCP.  She also might benefit from pulmonology assessment if no other cause can be found in the future.          Discharge Medications:     Discharge Medications        Changes to Medications        Instructions Start Date   levothyroxine 100 MCG tablet  Commonly known as: SYNTHROID, LEVOTHROID  What changed: additional instructions   100 mcg, Oral, Daily      montelukast 10 MG tablet  Commonly known as: SINGULAIR  What changed: additional instructions   10 mg, Oral, Daily With Breakfast      pantoprazole 40 MG EC tablet  Commonly known as: PROTONIX  What changed: additional instructions   40 mg, Oral, Daily             Continue These Medications        Instructions Start Date   losartan-hydrochlorothiazide 50-12.5 MG per tablet  Commonly known as: HYZAAR   1 tablet, Oral, Daily             Physical Exam:   General Appearance:    No acute distress, alert and oriented x4   Lungs:     Clear to auscultation bilaterally     Heart:    Regular rhythm and normal rate.  No murmurs, gallops, or    rubs.   Abdomen:     Soft, non-tender, non-distended.    Extremities:   Right radial artery cath access site without hematoma.  Right brachial venous access site without hematoma.  No clubbing, cyanosis, or edema.        Follow-up:  1.  Follow-up with PCP      Time Spent on Discharge: 35 minutes.      Usman Parikh MD  08/30/23  16:26 EDT

## 2023-08-30 NOTE — OUTREACH NOTE
Prep Survey      Flowsheet Row Responses   [facility] facility patient discharged from? [place]   Is LACE score < 7 ? Yes   Eligibility [facility]   Date of Admission 08/28/23   Date of Discharge 08/30/23   Discharge Disposition Home or Self Care   Discharge diagnosis Palpitations, progressive SOB   Does the patient have one of the following disease processes/diagnoses(primary or secondary)? Other   Does the patient have Home health ordered? No   Is there a DME ordered? No   Prep survey completed? Yes            Chela LUQUE - Registered Nurse

## 2023-08-30 NOTE — CASE MANAGEMENT/SOCIAL WORK
Discharge Planning Assessment  [facility]     Patient Name: Irma Dias  MRN: [id number]  Today's Date: 8/30/2023    Admit Date: 8/28/2023    Plan: Home; Denies needs.   Discharge Needs Assessment       Row Name 08/30/23 1620       Living Environment    People in Home spouse    Name(s) of People in Home Victorina Dias, spouse    Current Living Arrangements home    Potentially Unsafe Housing Conditions none    Primary Care Provided by self    Provides Primary Care For other (see comments)  [employer]    Family Caregiver if Needed spouse    Family Caregiver Names PAT, SPOUSE    Quality of Family Relationships helpful;involved;supportive    Able to Return to Prior Arrangements yes       Resource/Environmental Concerns    Resource/Environmental Concerns none    Transportation Concerns none       Food Insecurity    Within the past 12 months, you worried that your food would run out before you got the money to buy more. Never true    Within the past 12 months, the food you bought just didn't last and you didn't have money to get more. Never true       Transition Planning    Patient/Family Anticipates Transition to home with family    Patient/Family Anticipated Services at Transition none    Transportation Anticipated family or friend will provide       Discharge Needs Assessment    Readmission Within the Last 30 Days no previous admission in last 30 days    Equipment Currently Used at Home bp cuff;scales    Concerns to be Addressed no discharge needs identified;denies needs/concerns at this time    Anticipated Changes Related to Illness none    Equipment Needed After Discharge none    Provided Post Acute Provider List? N/A    N/A Provider List Comment No need for list identified                   Discharge Plan       Row Name 08/30/23 1624       Plan    Plan Home; Denies needs.    Plan Comments CCP spoke with Pt and spouse/Victorina Dias at bedside.  CCP role explained and discharge planning discussed.  Face sheet verified.   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  [place]), in a two-story home with six entrance stair steps.  Pt reports PCP is Jesse Renteria.  Pt confirmed pharmacy is [facility] in [place], [place].  Pt denies use of past home health or going to a sub-acute rehab.  Pt has the following DME- BP cuff and scale.  Pt plans to return home at discharge.  Pt denies current discharge needs.  CCP will continue to follow…….Dai DIAZ /.                  Continued Care and Services - Admitted Since 8/28/2023    Coordination has not been started for this encounter.       Expected Discharge Date and Time       Expected Discharge Date Expected Discharge Time    Aug 30, 2023            Demographic Summary       Row Name 08/30/23 1618       General Information    Admission Type inpatient    Arrived From emergency department    Referral Source admission list    Preferred Language English       Contact Information    Permission Granted to Share Info With family/designee                   Functional Status       Row Name 08/30/23 1619       Functional Status    Usual Activity Tolerance good    Current Activity Tolerance good       Assessment of Health Literacy    How often do you have someone help you read hospital materials? Never    Health Literacy Good       Functional Status, IADL    Medications independent    Meal Preparation independent    Housekeeping independent    Laundry independent    Shopping independent       Mental Status    General Appearance WDL WDL       Mental Status Summary    Recent Changes in Mental Status/Cognitive Functioning no changes                   Psychosocial    No documentation.                  Abuse/Neglect    No documentation.                  Legal    No documentation.                  Substance Abuse    No documentation.                  Patient Forms    No documentation.                     Dai Coyle RN

## 2023-08-31 ENCOUNTER — TRANSITIONAL CARE MANAGEMENT TELEPHONE ENCOUNTER (OUTPATIENT)
Dept: CALL CENTER | Facility: HOSPITAL | Age: 63
End: 2023-08-31
Payer: COMMERCIAL

## 2023-08-31 NOTE — OUTREACH NOTE
Call Center TCM Note      Flowsheet Row Responses   [facility] patient discharged from? [place]   Does the patient have one of the following disease processes/diagnoses(primary or secondary)? Other   TCM attempt successful? Yes   Call start time 1630   Call end time 1635   Discharge diagnosis Palpitations, progressive SOB   Meds reviewed with patient/caregiver? Yes   Is the patient having any side effects they believe may be caused by any medication additions or changes? No   Does the patient have all medications ordered at discharge? Yes   Is the patient taking all medications as directed (includes completed medication regime)? Yes   Has home health visited the patient within 72 hours of discharge? N/A   Psychosocial issues? No   Did the patient receive a copy of their discharge instructions? Yes   Nursing interventions Reviewed instructions with patient   What is the patient's perception of their health status since discharge? Improving   Is the patient/caregiver able to teach back signs and symptoms related to disease process for when to call PCP? Yes   Is the patient/caregiver able to teach back signs and symptoms related to disease process for when to call 911? Yes   Is the patient/caregiver able to teach back the hierarchy of who to call/visit for symptoms/problems? PCP, Specialist, Home health nurse, Urgent Care, ED, 911 Yes   If the patient is a current smoker, are they able to teach back resources for cessation? Not a smoker   TCM call completed? Yes   Wrap up additional comments D/C DX:Palpitations, progressive SOB - Pt about the same but relieved cardiac issues r/o. [facility] PPT with pcp Dr Renteria is 09/08/2023. Pt has also sched [facility]   Call end time 1635            Albertina Garcia MA    8/31/2023, 16:35 EDT

## 2023-08-31 NOTE — OUTREACH NOTE
Call Center TCM Note      Flowsheet Row Responses   [facility] patient discharged from? [place]   Does the patient have one of the following disease processes/diagnoses(primary or secondary)? Other   TCM attempt successful? No   Unsuccessful attempts Attempt 1            Albertina Garcia MA    8/31/2023, 15:26 EDT

## 2023-09-08 ENCOUNTER — TELEPHONE (OUTPATIENT)
Dept: FAMILY MEDICINE CLINIC | Facility: CLINIC | Age: 63
End: 2023-09-08

## 2023-09-08 ENCOUNTER — OFFICE VISIT (OUTPATIENT)
Dept: FAMILY MEDICINE CLINIC | Facility: CLINIC | Age: 63
End: 2023-09-08
Payer: COMMERCIAL

## 2023-09-08 ENCOUNTER — HOSPITAL ENCOUNTER (OUTPATIENT)
Dept: GENERAL RADIOLOGY | Facility: HOSPITAL | Age: 63
Discharge: HOME | End: 2023-09-08
Admitting: FAMILY MEDICINE
Payer: COMMERCIAL

## 2023-09-08 VITALS
BODY MASS INDEX: 31.24 KG/M2 | RESPIRATION RATE: 18 BRPM | OXYGEN SATURATION: 97 % | HEART RATE: 94 BPM | HEIGHT: 65 IN | WEIGHT: 187.5 LBS | SYSTOLIC BLOOD PRESSURE: 128 MMHG | DIASTOLIC BLOOD PRESSURE: 72 MMHG | TEMPERATURE: 97.8 F

## 2023-09-08 DIAGNOSIS — K20.90 ESOPHAGITIS: ICD-10-CM

## 2023-09-08 DIAGNOSIS — K22.70 BARRETT'S ESOPHAGUS WITHOUT DYSPLASIA: Primary | ICD-10-CM

## 2023-09-08 DIAGNOSIS — E66.09 CLASS 1 OBESITY DUE TO EXCESS CALORIES WITH SERIOUS COMORBIDITY AND BODY MASS INDEX (BMI) OF 31.0 TO 31.9 IN ADULT: ICD-10-CM

## 2023-09-08 DIAGNOSIS — M79.672 LEFT FOOT PAIN: ICD-10-CM

## 2023-09-08 DIAGNOSIS — R00.2 PALPITATIONS: ICD-10-CM

## 2023-09-08 PROCEDURE — 73630 X-RAY EXAM OF FOOT: CPT

## 2023-09-08 RX ORDER — DICYCLOMINE HYDROCHLORIDE 10 MG/1
10 CAPSULE ORAL 3 TIMES DAILY
Qty: 30 CAPSULE | Refills: 0 | Status: SHIPPED | OUTPATIENT
Start: 2023-09-08 | End: 2023-09-18

## 2023-09-08 NOTE — TELEPHONE ENCOUNTER
----- Message from Jesse Renteria MD sent at 9/8/2023  2:10 PM EDT -----  Please notify patient that:   Echo was normal

## 2023-09-08 NOTE — ASSESSMENT & PLAN NOTE
Patient's (Body mass index is 31.2 kg/m².) indicates that they are obese (BMI >30) with health conditions that include hypertension and dyslipidemias . Weight is unchanged. BMI  is above average; BMI management plan is completed. We discussed portion control and increasing exercise.

## 2023-09-08 NOTE — PROGRESS NOTES
Subjective   Irma Dias is a 62 y.o. female.   Chief Complaint   Patient presents with    Hospital Follow Up Visit       History of Present Illness  Irma Dias is a 62 y.o.female who presents for a transitional care management visit.   Pt presented in the ER with gradually worsening dyspnea on exertion for 6 months which has become severe.  She also had a recent syncopal episode which occurred 3 to 4 weeks ago, and she is wearing a long-term monitor from her primary care provider.  She was admitted for further evaluation, and initially underwent an echocardiogram.  This showed an ejection fraction of 60 to 65% with grade 1 diastolic dysfunction.  She did not have significant valvular disease.  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  Her coronary arteries were normal.  Her right-sided pressures were also fairly normal with a mean PA pressure of 25 (which is the upper limits of normal).  Her pulmonary capillary wedge pressure was only 9, and it did not appear that she was volume overloaded.  She did not have evidence of shunting on the oxygen saturation assessment.  Her right radial artery access site and right brachial vein access site both looked good without hematoma or complications. She underwent a CT angiogram of the chest on 8/30/2023 which showed no evidence of pulmonary embolism or right heart strain.  She had old granulomatous disease and a stable right lower lobe nodule (which has been stable long-term). There was some mild biapical pleural and parenchymal scarring, but no other major interstitial lung issues.  There was no evidence of pneumonia.  She also had pulmonary function test performed, which was normal. Per discharge note it does not seem that she has a cardiac cause for her shortness of breath or other symptoms and she might benefit from pulmonology assessment if no other cause can  be found in the future.    Within 48 business hours after discharge our office contacted her via telephone to coordinate her care and needs.      I reviewed and discussed the details of that call along with the discharge summary, hospital problems, inpatient lab results, inpatient diagnostic studies, and consultation reports with Irma.     Current outpatient and discharge medications have been reconciled for the patient.  Reviewed by: Liz Lord MA     Risk for Readmission (LACE) Score: 4 (8/30/2023  6:01 AM)    Pt with 3 week h/o left midfoot pain. States she fell         The following portions of the patient's history were reviewed and updated as appropriate: allergies, current medications, past family history, past medical history, past social history, past surgical history, and problem list.    Patient Active Problem List   Diagnosis    Benign hypertension    Ram's esophagus    Allergic rhinitis    Hypothyroidism    Mild intermittent asthma without complication    Menopausal syndrome    Acute reaction to stress    Mixed hyperlipidemia    Gilbert's syndrome    Genital herpes simplex    Acute non-recurrent maxillary sinusitis    Astigmatism    Chronic maxillary sinusitis    Class 1 obesity due to excess calories with serious comorbidity and body mass index (BMI) of 31.0 to 31.9 in adult    Personal history of endometriosis    Menopausal symptoms    History of total hysterectomy with bilateral salpingo-oophorectomy (BSO)    Palpitations       Current Outpatient Medications on File Prior to Visit   Medication Sig Dispense Refill    levothyroxine (SYNTHROID, LEVOTHROID) 100 MCG tablet Take 1 tablet by mouth Daily. 90 tablet 3    losartan-hydrochlorothiazide (HYZAAR) 50-12.5 MG per tablet Take 1 tablet by mouth Daily. 90 tablet 3    montelukast (SINGULAIR) 10 MG tablet Take 1 tablet by mouth Daily With Breakfast. 90 tablet 3    pantoprazole (PROTONIX) 40 MG EC tablet Take 1 tablet by mouth Daily. 90 tablet 3  "    No current facility-administered medications on file prior to visit.     Current outpatient and discharge medications have been reconciled for the patient.  Reviewed by: Jesse Renteria MD      Allergies   Allergen Reactions    Codeine Nausea Only and Hallucinations    Gabapentin Hallucinations    Metoprolol Shortness Of Breath    Mobic [Meloxicam] Shortness Of Breath    Penicillin V Potassium Rash    Sulfamethoxazole-Trimethoprim Rash    Amlodipine Besylate Unknown - Low Severity    Bystolic [Nebivolol Hcl] Headache    Cefprozil Itching    Hydrocodone Nausea And Vomiting    Lisinopril Cough    Sulfa Antibiotics Hallucinations    Tizanidine Hcl Hallucinations    Valsartan Itching and Nausea And Vomiting    Penicillins Rash       Review of Systems   Constitutional:  Negative for activity change, appetite change, fatigue and fever.   HENT:  Negative for ear pain, swollen glands and voice change.    Eyes:  Negative for visual disturbance.   Respiratory:  Negative for shortness of breath and wheezing.    Cardiovascular:  Negative for chest pain and leg swelling.   Gastrointestinal:  Negative for abdominal pain, blood in stool, constipation, diarrhea, nausea and vomiting.   Endocrine: Negative for polydipsia and polyuria.   Genitourinary:  Negative for dysuria, frequency and hematuria.   Musculoskeletal:  Negative for joint swelling, neck pain and neck stiffness.   Skin:  Negative for rash and wound.   Neurological:  Negative for weakness, numbness and headache.   Psychiatric/Behavioral:  Negative for suicidal ideas and depressed mood.    I have reviewed and confirmed the accuracy of the ROS as documented by the MA/LPN/RN Jesse Renteria MD    Objective   Visit Vitals  /72 (BP Location: Right arm, Patient Position: Sitting, Cuff Size: Adult)   Pulse 94   Temp 97.8 °F (36.6 °C) (Temporal)   Resp 18   Ht 165.1 cm (65\")   Wt 85 kg (187 lb 8 oz)   SpO2 97% Comment: room air   BMI 31.20 kg/m²      " **  Physical Exam  Constitutional:       Appearance: She is well-developed.   HENT:      Head: Normocephalic and atraumatic.      Right Ear: External ear normal.      Left Ear: External ear normal.      Nose: Nose normal.   Eyes:      Pupils: Pupils are equal, round, and reactive to light.   Cardiovascular:      Rate and Rhythm: Normal rate and regular rhythm.      Heart sounds: Normal heart sounds.   Pulmonary:      Effort: Pulmonary effort is normal.      Breath sounds: Normal breath sounds.   Abdominal:      General: Bowel sounds are normal.      Palpations: Abdomen is soft.   Musculoskeletal:         General: Normal range of motion.      Cervical back: Normal range of motion and neck supple.        Feet:    Feet:      Comments: Pain midfoot dorsal   Skin:     General: Skin is warm and dry.   Neurological:      Mental Status: She is alert and oriented to person, place, and time.   Psychiatric:         Behavior: Behavior normal.         Thought Content: Thought content normal.         Judgment: Judgment normal.     Derm Physical Exam    Diagnoses and all orders for this visit:    1. Ram's esophagus without dysplasia (Primary)  Comments:  increase protonix to bid    add bentyl  Assessment & Plan:  Increase protonix to bid       2. Esophagitis  -     dicyclomine (BENTYL) 10 MG capsule; Take 1 capsule by mouth 3 (Three) Times a Day for 10 days.  Dispense: 30 capsule; Refill: 0    3. Palpitations  Comments:  consider follow up with cardiology if sx persist despite GI tx  Overview:  Holter 8/2023 showed occasional 5 beat runs of ventricular tachycardia (multifocal).       4. Class 1 obesity due to excess calories with serious comorbidity and body mass index (BMI) of 31.0 to 31.9 in adult  Assessment & Plan:  Patient's (Body mass index is 31.2 kg/m².) indicates that they are obese (BMI >30) with health conditions that include hypertension and dyslipidemias . Weight is unchanged. BMI  is above average; BMI management  plan is completed. We discussed portion control and increasing exercise.       5. Left foot pain  Comments:  x 3 weeks  Orders:  -     XR Foot 3+ View Left     Findings discussed. All questions answered.  Differential diagnosis discussed.      Consider rechecking holter if above neg    Follow-up after testing complete, sooner for worsening symptoms or any concerns       Expected course, medications, and adverse effects discussed as appropriate.  Call or return if worsening or persistent symptoms.     This document is intended for medical professional use only.

## 2023-09-11 ENCOUNTER — PATIENT MESSAGE (OUTPATIENT)
Dept: FAMILY MEDICINE CLINIC | Facility: CLINIC | Age: 63
End: 2023-09-11
Payer: COMMERCIAL

## 2023-10-09 ENCOUNTER — OFFICE (AMBULATORY)
Dept: URBAN - METROPOLITAN AREA CLINIC 64 | Facility: CLINIC | Age: 63
End: 2023-10-09
Payer: COMMERCIAL

## 2023-10-09 VITALS
SYSTOLIC BLOOD PRESSURE: 134 MMHG | DIASTOLIC BLOOD PRESSURE: 92 MMHG | HEIGHT: 65 IN | WEIGHT: 190 LBS | HEART RATE: 70 BPM

## 2023-10-09 DIAGNOSIS — K92.1 MELENA: ICD-10-CM

## 2023-10-09 DIAGNOSIS — K21.9 GASTRO-ESOPHAGEAL REFLUX DISEASE WITHOUT ESOPHAGITIS: ICD-10-CM

## 2023-10-09 DIAGNOSIS — Z86.010 PERSONAL HISTORY OF COLONIC POLYPS: ICD-10-CM

## 2023-10-09 DIAGNOSIS — Z80.0 FAMILY HISTORY OF MALIGNANT NEOPLASM OF DIGESTIVE ORGANS: ICD-10-CM

## 2023-10-09 PROCEDURE — 99204 OFFICE O/P NEW MOD 45 MIN: CPT | Performed by: INTERNAL MEDICINE

## 2023-10-20 ENCOUNTER — OFFICE (AMBULATORY)
Dept: URBAN - METROPOLITAN AREA PATHOLOGY 4 | Facility: PATHOLOGY | Age: 63
End: 2023-10-20
Payer: COMMERCIAL

## 2023-10-20 ENCOUNTER — ON CAMPUS - OUTPATIENT (AMBULATORY)
Dept: URBAN - METROPOLITAN AREA HOSPITAL 2 | Facility: HOSPITAL | Age: 63
End: 2023-10-20
Payer: COMMERCIAL

## 2023-10-20 VITALS
DIASTOLIC BLOOD PRESSURE: 73 MMHG | TEMPERATURE: 97.8 F | SYSTOLIC BLOOD PRESSURE: 141 MMHG | SYSTOLIC BLOOD PRESSURE: 123 MMHG | HEART RATE: 80 BPM | DIASTOLIC BLOOD PRESSURE: 68 MMHG | HEART RATE: 87 BPM | DIASTOLIC BLOOD PRESSURE: 83 MMHG | SYSTOLIC BLOOD PRESSURE: 124 MMHG | WEIGHT: 182 LBS | OXYGEN SATURATION: 97 % | SYSTOLIC BLOOD PRESSURE: 113 MMHG | SYSTOLIC BLOOD PRESSURE: 103 MMHG | OXYGEN SATURATION: 99 % | RESPIRATION RATE: 20 BRPM | DIASTOLIC BLOOD PRESSURE: 82 MMHG | SYSTOLIC BLOOD PRESSURE: 112 MMHG | DIASTOLIC BLOOD PRESSURE: 101 MMHG | HEART RATE: 82 BPM | DIASTOLIC BLOOD PRESSURE: 90 MMHG | HEART RATE: 104 BPM | RESPIRATION RATE: 16 BRPM | HEART RATE: 92 BPM | HEART RATE: 84 BPM | HEIGHT: 65 IN | SYSTOLIC BLOOD PRESSURE: 145 MMHG | RESPIRATION RATE: 17 BRPM | DIASTOLIC BLOOD PRESSURE: 98 MMHG | RESPIRATION RATE: 18 BRPM | HEART RATE: 69 BPM | SYSTOLIC BLOOD PRESSURE: 146 MMHG | DIASTOLIC BLOOD PRESSURE: 97 MMHG | OXYGEN SATURATION: 100 %

## 2023-10-20 DIAGNOSIS — K63.5 POLYP OF COLON: ICD-10-CM

## 2023-10-20 DIAGNOSIS — K92.1 MELENA: ICD-10-CM

## 2023-10-20 DIAGNOSIS — Z09 ENCOUNTER FOR FOLLOW-UP EXAMINATION AFTER COMPLETED TREATMEN: ICD-10-CM

## 2023-10-20 DIAGNOSIS — Z86.010 PERSONAL HISTORY OF COLONIC POLYPS: ICD-10-CM

## 2023-10-20 DIAGNOSIS — K31.89 OTHER DISEASES OF STOMACH AND DUODENUM: ICD-10-CM

## 2023-10-20 DIAGNOSIS — K21.9 GASTRO-ESOPHAGEAL REFLUX DISEASE WITHOUT ESOPHAGITIS: ICD-10-CM

## 2023-10-20 DIAGNOSIS — K57.30 DIVERTICULOSIS OF LARGE INTESTINE WITHOUT PERFORATION OR ABS: ICD-10-CM

## 2023-10-20 DIAGNOSIS — Z80.0 FAMILY HISTORY OF MALIGNANT NEOPLASM OF DIGESTIVE ORGANS: ICD-10-CM

## 2023-10-20 DIAGNOSIS — K31.7 POLYP OF STOMACH AND DUODENUM: ICD-10-CM

## 2023-10-20 PROBLEM — T18.2XXA FOREIGN BODY IN STOMACH, INITIAL ENCOUNTER: Status: ACTIVE | Noted: 2023-10-20

## 2023-10-20 LAB
GI HISTOLOGY: A. UNSPECIFIED: (no result)
GI HISTOLOGY: PDF REPORT: (no result)

## 2023-10-20 PROCEDURE — 45385 COLONOSCOPY W/LESION REMOVAL: CPT | Mod: 33 | Performed by: INTERNAL MEDICINE

## 2023-10-20 PROCEDURE — 43235 EGD DIAGNOSTIC BRUSH WASH: CPT | Performed by: INTERNAL MEDICINE

## 2023-10-20 PROCEDURE — 88305 TISSUE EXAM BY PATHOLOGIST: CPT | Performed by: INTERNAL MEDICINE

## 2023-10-23 DIAGNOSIS — K20.90 ESOPHAGITIS: ICD-10-CM

## 2023-10-23 RX ORDER — PANTOPRAZOLE SODIUM 40 MG/1
40 TABLET, DELAYED RELEASE ORAL 2 TIMES DAILY
Qty: 180 TABLET | Refills: 3 | Status: SHIPPED | OUTPATIENT
Start: 2023-10-23

## 2023-10-23 NOTE — TELEPHONE ENCOUNTER
Caller: Irma Dias    Relationship: Self    Best call back number: [phone]     What medication are you requesting: PROTONIX 40 MG TWICE A DAY    If a prescription is needed, what is your preferred pharmacy and phone number: [id number] - [place], [place] - [address] AT [facility] [address]  [address] - [phone]  - [phone]     Additional notes: PLEASE SEND IN UPDATED PRESCRIPTION, PATIENT IS OUT OF MEDICATION.

## 2023-12-04 ENCOUNTER — OFFICE (AMBULATORY)
Dept: URBAN - METROPOLITAN AREA CLINIC 64 | Facility: CLINIC | Age: 63
End: 2023-12-04
Payer: COMMERCIAL

## 2023-12-04 VITALS
SYSTOLIC BLOOD PRESSURE: 140 MMHG | DIASTOLIC BLOOD PRESSURE: 89 MMHG | HEIGHT: 65 IN | HEART RATE: 64 BPM | WEIGHT: 186.2 LBS

## 2023-12-04 DIAGNOSIS — K21.9 GASTRO-ESOPHAGEAL REFLUX DISEASE WITHOUT ESOPHAGITIS: ICD-10-CM

## 2023-12-04 DIAGNOSIS — K31.84 GASTROPARESIS: ICD-10-CM

## 2023-12-04 DIAGNOSIS — R11.0 NAUSEA: ICD-10-CM

## 2023-12-04 DIAGNOSIS — R14.0 ABDOMINAL DISTENSION (GASEOUS): ICD-10-CM

## 2023-12-04 DIAGNOSIS — R10.13 EPIGASTRIC PAIN: ICD-10-CM

## 2023-12-04 PROCEDURE — 99214 OFFICE O/P EST MOD 30 MIN: CPT | Performed by: NURSE PRACTITIONER

## 2023-12-04 RX ORDER — ONDANSETRON 4 MG/1
12 TABLET, ORALLY DISINTEGRATING ORAL
Qty: 45 | Refills: 4 | Status: COMPLETED
Start: 2023-12-04 | End: 2024-02-26

## 2023-12-04 RX ORDER — ERYTHROMYCIN 250 MG/1
1000 TABLET, FILM COATED ORAL
Qty: 120 | Refills: 3 | Status: COMPLETED
Start: 2023-12-04 | End: 2024-05-13

## 2023-12-04 RX ORDER — PANTOPRAZOLE 40 MG/1
80 TABLET, DELAYED RELEASE ORAL
Qty: 180 | Refills: 3 | Status: ACTIVE

## 2024-02-26 ENCOUNTER — OFFICE (AMBULATORY)
Dept: URBAN - METROPOLITAN AREA CLINIC 64 | Facility: CLINIC | Age: 64
End: 2024-02-26

## 2024-02-26 VITALS
WEIGHT: 187 LBS | HEART RATE: 68 BPM | DIASTOLIC BLOOD PRESSURE: 91 MMHG | HEIGHT: 65 IN | DIASTOLIC BLOOD PRESSURE: 93 MMHG | SYSTOLIC BLOOD PRESSURE: 146 MMHG | SYSTOLIC BLOOD PRESSURE: 156 MMHG

## 2024-02-26 DIAGNOSIS — K31.84 GASTROPARESIS: ICD-10-CM

## 2024-02-26 DIAGNOSIS — R14.0 ABDOMINAL DISTENSION (GASEOUS): ICD-10-CM

## 2024-02-26 DIAGNOSIS — K21.9 GASTRO-ESOPHAGEAL REFLUX DISEASE WITHOUT ESOPHAGITIS: ICD-10-CM

## 2024-02-26 DIAGNOSIS — R11.0 NAUSEA: ICD-10-CM

## 2024-02-26 PROCEDURE — 99214 OFFICE O/P EST MOD 30 MIN: CPT | Performed by: INTERNAL MEDICINE

## 2024-03-25 ENCOUNTER — TELEPHONE (OUTPATIENT)
Dept: FAMILY MEDICINE CLINIC | Facility: CLINIC | Age: 64
End: 2024-03-25
Payer: COMMERCIAL

## 2024-03-25 DIAGNOSIS — R19.7 DIARRHEA, UNSPECIFIED TYPE: Primary | ICD-10-CM

## 2024-03-25 RX ORDER — METRONIDAZOLE 500 MG/1
500 TABLET ORAL 3 TIMES DAILY
Qty: 21 TABLET | Refills: 0 | Status: SHIPPED | OUTPATIENT
Start: 2024-03-25 | End: 2024-04-01

## 2024-03-25 NOTE — TELEPHONE ENCOUNTER
Patient phoned in requesting medication please for her diarrhea that started last Wednesday 03/20/2024. She did have fever for 2 days, but that is gone. 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 She uses [facility] here in [place].

## 2024-04-03 ENCOUNTER — TELEPHONE (OUTPATIENT)
Dept: FAMILY MEDICINE CLINIC | Facility: CLINIC | Age: 64
End: 2024-04-03
Payer: COMMERCIAL

## 2024-04-03 NOTE — TELEPHONE ENCOUNTER
Patient has swelling in hands, legs, abdomen; high blood pressure; and tingling in legs and fingers. Patient stated that she in not in pain and wants to wait to be seen in office in stead of the ER. Appointment scheduled for tomorrow at 8:15.

## 2024-04-04 ENCOUNTER — OFFICE VISIT (OUTPATIENT)
Dept: FAMILY MEDICINE CLINIC | Facility: CLINIC | Age: 64
End: 2024-04-04
Payer: COMMERCIAL

## 2024-04-04 VITALS
HEART RATE: 80 BPM | DIASTOLIC BLOOD PRESSURE: 90 MMHG | SYSTOLIC BLOOD PRESSURE: 134 MMHG | RESPIRATION RATE: 20 BRPM | BODY MASS INDEX: 31.99 KG/M2 | WEIGHT: 192 LBS | HEIGHT: 65 IN

## 2024-04-04 DIAGNOSIS — R53.83 OTHER FATIGUE: ICD-10-CM

## 2024-04-04 DIAGNOSIS — I10 BENIGN HYPERTENSION: ICD-10-CM

## 2024-04-04 DIAGNOSIS — E78.2 MIXED HYPERLIPIDEMIA: ICD-10-CM

## 2024-04-04 DIAGNOSIS — E03.8 OTHER SPECIFIED HYPOTHYROIDISM: ICD-10-CM

## 2024-04-04 DIAGNOSIS — R06.02 SHORTNESS OF BREATH: ICD-10-CM

## 2024-04-04 DIAGNOSIS — E66.09 CLASS 1 OBESITY DUE TO EXCESS CALORIES WITH SERIOUS COMORBIDITY AND BODY MASS INDEX (BMI) OF 31.0 TO 31.9 IN ADULT: ICD-10-CM

## 2024-04-04 DIAGNOSIS — R60.0 LOCALIZED EDEMA: Primary | ICD-10-CM

## 2024-04-04 PROCEDURE — 93000 ELECTROCARDIOGRAM COMPLETE: CPT | Performed by: FAMILY MEDICINE

## 2024-04-04 PROCEDURE — 99214 OFFICE O/P EST MOD 30 MIN: CPT | Performed by: FAMILY MEDICINE

## 2024-04-04 RX ORDER — LISINOPRIL 5 MG/1
5 TABLET ORAL DAILY
COMMUNITY
End: 2024-04-04

## 2024-04-04 RX ORDER — EPINEPHRINE 0.3 MG/.3ML
INJECTION, SOLUTION INTRAMUSCULAR
COMMUNITY
Start: 2024-02-12

## 2024-04-04 RX ORDER — OMEGA-3S/DHA/EPA/FISH OIL/D3 300MG-1000
CAPSULE ORAL
COMMUNITY
End: 2024-04-04

## 2024-04-04 RX ORDER — MELOXICAM 7.5 MG/1
1 TABLET ORAL DAILY
COMMUNITY
Start: 2024-03-28

## 2024-04-04 RX ORDER — ERYTHROMYCIN 250 MG/1
250 TABLET, COATED ORAL 4 TIMES DAILY
COMMUNITY

## 2024-04-04 RX ORDER — MELOXICAM 15 MG/1
15 TABLET ORAL DAILY
COMMUNITY

## 2024-04-04 NOTE — PROGRESS NOTES
Subjective   Irma Dias is a 63 y.o. female.   Chief Complaint   Patient presents with    Edema    Hypertension       History of Present Illness  Pt initially had GI virus approx 2 weeks ago.  Now with Ongoing significant fatigue, edema to lower thighs. No soa  No rash   Hypothyroidism  This is a chronic problem. The problem has been unchanged. Associated symptoms include fatigue. Pertinent negatives include no abdominal pain, fever, joint swelling, nausea, neck pain, numbness, rash, swollen glands, vomiting or weakness.   Hypertension  This is a chronic problem. The problem is controlled. Pertinent negatives include no neck pain or shortness of breath. There are no known risk factors for coronary artery disease. Current antihypertension treatment includes ACE inhibitors and diuretics. The current treatment provides significant improvement. There are no compliance problems.    Hyperlipidemia  This is a chronic problem. The problem is controlled. Exacerbating diseases include hypothyroidism. Pertinent negatives include no shortness of breath. She is currently on no antihyperlipidemic treatment.        The following portions of the patient's history were reviewed and updated as appropriate: allergies, current medications, past family history, past medical history, past social history, past surgical history, and problem list.    Patient Active Problem List   Diagnosis    Benign hypertension    Ram's esophagus    Allergic rhinitis    Hypothyroidism    Mild intermittent asthma without complication    Menopausal syndrome    Acute reaction to stress    Mixed hyperlipidemia    Gilbert's syndrome    Genital herpes simplex    Acute non-recurrent maxillary sinusitis    Astigmatism    Chronic maxillary sinusitis    Class 1 obesity due to excess calories with serious comorbidity and body mass index (BMI) of 31.0 to 31.9 in adult    Personal history of endometriosis    Menopausal symptoms    History of total hysterectomy  with bilateral salpingo-oophorectomy (BSO)    Palpitations       Current Outpatient Medications on File Prior to Visit   Medication Sig Dispense Refill    Auvi-Q 0.3 MG/0.3ML solution auto-injector injection       erythromycin base (E-MYCIN) 250 MG tablet Take 1 tablet by mouth 4 (Four) Times a Day.      levothyroxine (SYNTHROID, LEVOTHROID) 100 MCG tablet Take 1 tablet by mouth Daily. 90 tablet 3    losartan-hydrochlorothiazide (HYZAAR) 50-12.5 MG per tablet Take 1 tablet by mouth Daily. 90 tablet 3    meloxicam (MOBIC) 15 MG tablet Take 1 tablet by mouth Daily.      meloxicam (MOBIC) 7.5 MG tablet Take 1 tablet by mouth Daily.      montelukast (SINGULAIR) 10 MG tablet Take 1 tablet by mouth Daily With Breakfast. 90 tablet 3    pantoprazole (PROTONIX) 40 MG EC tablet Take 1 tablet by mouth 2 (Two) Times a Day. 180 tablet 3    [DISCONTINUED] Cholecalciferol 10 MCG (400 UNIT) tablet 0 (Patient not taking: Reported on 4/4/2024)      [DISCONTINUED] lisinopril (PRINIVIL,ZESTRIL) 5 MG tablet Take 1 tablet by mouth Daily. (Patient not taking: Reported on 4/4/2024)       No current facility-administered medications on file prior to visit.     Current outpatient and discharge medications have been reconciled for the patient.  Reviewed by: Jesse Renteria MD      Allergies   Allergen Reactions    Codeine Nausea Only and Hallucinations    Gabapentin Hallucinations    Metoprolol Shortness Of Breath    Mobic [Meloxicam] Shortness Of Breath    Penicillin V Potassium Rash    Sulfamethoxazole-Trimethoprim Rash    Amlodipine Besylate Unknown - Low Severity    Bystolic [Nebivolol Hcl] Headache    Cefprozil Itching    Hydrocodone Nausea And Vomiting    Hydrocodone-Acetaminophen Nausea And Vomiting    Lisinopril Cough    Sulfa Antibiotics Hallucinations    Tizanidine Hcl Hallucinations    Valsartan Itching and Nausea And Vomiting    Penicillins Rash       Review of Systems   Constitutional:  Positive for fatigue. Negative for  "activity change, appetite change and fever.   HENT:  Negative for ear pain, swollen glands and voice change.    Eyes:  Negative for visual disturbance.   Respiratory:  Negative for shortness of breath and wheezing.    Cardiovascular:  Positive for leg swelling.   Gastrointestinal:  Negative for abdominal pain, blood in stool, constipation, diarrhea, nausea and vomiting.   Endocrine: Negative for polydipsia and polyuria.   Genitourinary:  Negative for dysuria, frequency and hematuria.   Musculoskeletal:  Negative for joint swelling, neck pain and neck stiffness.   Skin:  Negative for rash and wound.   Neurological:  Negative for weakness, numbness and headache.   Psychiatric/Behavioral:  Negative for suicidal ideas and depressed mood.      I have reviewed and confirmed the accuracy of the ROS as documented by the MA/LPN/RN Jesse Renteria MD    Objective   Visit Vitals  /90 (BP Location: Right arm, Patient Position: Sitting, Cuff Size: Large Adult)   Pulse 80   Resp 20   Ht 165.1 cm (65\")   Wt 87.1 kg (192 lb)   BMI 31.95 kg/m²      **  Physical Exam  Constitutional:       Appearance: She is well-developed.   HENT:      Head: Normocephalic and atraumatic.      Right Ear: External ear normal.      Left Ear: External ear normal.      Nose: Nose normal.   Eyes:      Pupils: Pupils are equal, round, and reactive to light.   Cardiovascular:      Rate and Rhythm: Normal rate and regular rhythm.      Heart sounds: Normal heart sounds.   Pulmonary:      Effort: Pulmonary effort is normal.      Breath sounds: Normal breath sounds.   Abdominal:      General: Bowel sounds are normal.      Palpations: Abdomen is soft.   Musculoskeletal:         General: Normal range of motion.      Cervical back: Normal range of motion and neck supple.      Right lower le+ Pitting Edema present.      Left lower le+ Pitting Edema present.   Skin:     General: Skin is warm and dry.   Neurological:      Mental Status: She is " alert and oriented to person, place, and time.   Psychiatric:         Behavior: Behavior normal.         Thought Content: Thought content normal.         Judgment: Judgment normal.       Derm Physical Exam  Ortho Exam   Neurologic Exam     Mental Status   Oriented to person, place, and time.     Cranial Nerves     CN III, IV, VI   Pupils are equal, round, and reactive to light.       ECG 12 Lead    Date/Time: 4/4/2024 9:03 AM  Performed by: Jesse Renteria MD    Authorized by: Jesse Renteria MD  Comparison: compared with previous ECG from 8/28/2024  Similar to previous ECG  Rhythm: sinus rhythm  Rate: normal  Conduction: non-specific intraventricular conduction delay  ST Segments: ST segments normal  T Waves: T waves normal  QRS axis: normal  Other: no other findings    Clinical impression: normal ECG           Diagnoses and all orders for this visit:    1. Localized edema (Primary)  -     ECG 12 Lead    2. Other fatigue  -     Sedimentation Rate  -     C-reactive Protein  -     Ehrlichia Profile DNA PCR  -     Spotted Fever Group AB, IgG/IgM  -     Lyme Disease Total Antibody With Reflex to Immunoassay    3. Other specified hypothyroidism  -     TSH    4. Mixed hyperlipidemia  -     Lipid Panel With / Chol / HDL Ratio    5. Shortness of breath  -     ECG 12 Lead    6. Class 1 obesity due to excess calories with serious comorbidity and body mass index (BMI) of 31.0 to 31.9 in adult    7. Benign hypertension  -     CBC & Differential  -     Comprehensive Metabolic Panel    Recheck labs, screen for tick illness.  If labs unrevealing, try adding aldactone 25 mg qd to bid  Findings discussed. All questions answered.  Differential diagnosis discussed.   Recent cardiac workup neg   Follow-up for routine health maintenance as indicated.           Expected course, medications, and adverse effects discussed as appropriate.  Call or return if worsening or persistent symptoms.     This document is intended for  medical professional use only.   Electronically signed by Jesse Renteria MD on 04/04/2024. This content may not have been proofread.

## 2024-04-05 ENCOUNTER — PATIENT MESSAGE (OUTPATIENT)
Dept: FAMILY MEDICINE CLINIC | Facility: CLINIC | Age: 64
End: 2024-04-05
Payer: COMMERCIAL

## 2024-04-05 DIAGNOSIS — E03.9 HYPOTHYROIDISM, UNSPECIFIED TYPE: ICD-10-CM

## 2024-04-05 LAB
ALBUMIN SERPL-MCNC: 3.8 G/DL (ref 3.9–4.9)
ALBUMIN/GLOB SERPL: 1.6 {RATIO} (ref 1.2–2.2)
ALP SERPL-CCNC: 50 IU/L (ref 44–121)
ALT SERPL-CCNC: 18 IU/L (ref 0–32)
AST SERPL-CCNC: 16 IU/L (ref 0–40)
B BURGDOR IGG+IGM SER QL IA: NEGATIVE
BASOPHILS # BLD AUTO: 0.1 X10E3/UL (ref 0–0.2)
BASOPHILS NFR BLD AUTO: 1 %
BILIRUB SERPL-MCNC: 1.1 MG/DL (ref 0–1.2)
BUN SERPL-MCNC: 10 MG/DL (ref 8–27)
BUN/CREAT SERPL: 9 (ref 12–28)
CALCIUM SERPL-MCNC: 9.6 MG/DL (ref 8.7–10.3)
CHLORIDE SERPL-SCNC: 103 MMOL/L (ref 96–106)
CHOLEST SERPL-MCNC: 184 MG/DL (ref 100–199)
CHOLEST/HDLC SERPL: 3.5 RATIO (ref 0–4.4)
CO2 SERPL-SCNC: 25 MMOL/L (ref 20–29)
CREAT SERPL-MCNC: 1.11 MG/DL (ref 0.57–1)
CRP SERPL-MCNC: <1 MG/L (ref 0–10)
EGFRCR SERPLBLD CKD-EPI 2021: 55 ML/MIN/1.73
EOSINOPHIL # BLD AUTO: 0.3 X10E3/UL (ref 0–0.4)
EOSINOPHIL NFR BLD AUTO: 4 %
ERYTHROCYTE [DISTWIDTH] IN BLOOD BY AUTOMATED COUNT: 13 % (ref 11.7–15.4)
ERYTHROCYTE [SEDIMENTATION RATE] IN BLOOD BY WESTERGREN METHOD: 3 MM/HR (ref 0–40)
GLOBULIN SER CALC-MCNC: 2.4 G/DL (ref 1.5–4.5)
GLUCOSE SERPL-MCNC: 88 MG/DL (ref 70–99)
HCT VFR BLD AUTO: 39.2 % (ref 34–46.6)
HDLC SERPL-MCNC: 52 MG/DL
HGB BLD-MCNC: 13.3 G/DL (ref 11.1–15.9)
IMM GRANULOCYTES # BLD AUTO: 0 X10E3/UL (ref 0–0.1)
IMM GRANULOCYTES NFR BLD AUTO: 0 %
LDLC SERPL CALC-MCNC: 116 MG/DL (ref 0–99)
LYMPHOCYTES # BLD AUTO: 1.6 X10E3/UL (ref 0.7–3.1)
LYMPHOCYTES NFR BLD AUTO: 27 %
MCH RBC QN AUTO: 31.6 PG (ref 26.6–33)
MCHC RBC AUTO-ENTMCNC: 33.9 G/DL (ref 31.5–35.7)
MCV RBC AUTO: 93 FL (ref 79–97)
MONOCYTES # BLD AUTO: 0.5 X10E3/UL (ref 0.1–0.9)
MONOCYTES NFR BLD AUTO: 9 %
NEUTROPHILS # BLD AUTO: 3.5 X10E3/UL (ref 1.4–7)
NEUTROPHILS NFR BLD AUTO: 59 %
PLATELET # BLD AUTO: 240 X10E3/UL (ref 150–450)
POTASSIUM SERPL-SCNC: 3.8 MMOL/L (ref 3.5–5.2)
PROT SERPL-MCNC: 6.2 G/DL (ref 6–8.5)
RBC # BLD AUTO: 4.21 X10E6/UL (ref 3.77–5.28)
SODIUM SERPL-SCNC: 143 MMOL/L (ref 134–144)
TRIGL SERPL-MCNC: 86 MG/DL (ref 0–149)
TSH SERPL DL<=0.005 MIU/L-ACNC: 4.86 UIU/ML (ref 0.45–4.5)
VLDLC SERPL CALC-MCNC: 16 MG/DL (ref 5–40)
WBC # BLD AUTO: 6 X10E3/UL (ref 3.4–10.8)

## 2024-04-05 RX ORDER — LEVOTHYROXINE SODIUM 112 UG/1
112 TABLET ORAL DAILY
Qty: 90 TABLET | Refills: 1 | Status: SHIPPED | OUTPATIENT
Start: 2024-04-05

## 2024-04-06 LAB
A PHAGOCYTOPH DNA BLD QL NAA+PROBE: NEGATIVE
E CHAFFEENSIS DNA BLD QL NAA+PROBE: NEGATIVE

## 2024-04-09 ENCOUNTER — PATIENT MESSAGE (OUTPATIENT)
Dept: FAMILY MEDICINE CLINIC | Facility: CLINIC | Age: 64
End: 2024-04-09
Payer: COMMERCIAL

## 2024-04-09 LAB
RESULT COMMENT:: NORMAL
RICK SF IGG TITR SER: NORMAL {TITER}
RICK SF IGM TITR SER: NORMAL {TITER}

## 2024-04-11 NOTE — PROGRESS NOTES
Subjective   Irma Dias is a 63 y.o. female.   Chief Complaint   Patient presents with    Edema    Shortness of Breath       History of Present Illness  BP still up at home.  Still with edema.   Started increased dose of synthroid as TSH was out of range  Las labs reviewed. Esr and crp nl    Answers submitted by the patient for this visit:  Other (Submitted on 4/11/2024)  Please describe your symptoms.: Swelling of legs  Have you had these symptoms before?: Yes  How long have you been having these symptoms?: Greater than 2 weeks  Primary Reason for Visit (Submitted on 4/11/2024)  What is the primary reason for your visit?: Other    Hypertension  This is a chronic problem. The problem is unchanged. The problem is uncontrolled. Pertinent negatives include no chest pain, neck pain or shortness of breath. Current antihypertension treatment includes angiotensin blockers and diuretics. The current treatment provides moderate improvement.        The following portions of the patient's history were reviewed and updated as appropriate: allergies, current medications, past family history, past medical history, past social history, past surgical history, and problem list.    Patient Active Problem List   Diagnosis    Benign hypertension    Ram's esophagus    Allergic rhinitis    Hypothyroidism    Mild intermittent asthma without complication    Menopausal syndrome    Acute reaction to stress    Mixed hyperlipidemia    Gilbert's syndrome    Genital herpes simplex    Acute non-recurrent maxillary sinusitis    Astigmatism    Chronic maxillary sinusitis    Class 1 obesity due to excess calories with serious comorbidity and body mass index (BMI) of 31.0 to 31.9 in adult    Personal history of endometriosis    Menopausal symptoms    History of total hysterectomy with bilateral salpingo-oophorectomy (BSO)    Palpitations       Current Outpatient Medications on File Prior to Visit   Medication Sig Dispense Refill    Auvi-Q 0.3  MG/0.3ML solution auto-injector injection       erythromycin base (E-MYCIN) 250 MG tablet Take 1 tablet by mouth 4 (Four) Times a Day.      levothyroxine (SYNTHROID, LEVOTHROID) 112 MCG tablet Take 1 tablet by mouth Daily. 90 tablet 1    losartan-hydrochlorothiazide (HYZAAR) 50-12.5 MG per tablet Take 1 tablet by mouth Daily. 90 tablet 3    meloxicam (MOBIC) 15 MG tablet Take 1 tablet by mouth Daily.      montelukast (SINGULAIR) 10 MG tablet Take 1 tablet by mouth Daily With Breakfast. 90 tablet 3    pantoprazole (PROTONIX) 40 MG EC tablet Take 1 tablet by mouth 2 (Two) Times a Day. 180 tablet 3    [DISCONTINUED] meloxicam (MOBIC) 7.5 MG tablet Take 1 tablet by mouth Daily.       No current facility-administered medications on file prior to visit.     Current outpatient and discharge medications have been reconciled for the patient.  Reviewed by: Jesse Renteria MD      Allergies   Allergen Reactions    Codeine Nausea Only and Hallucinations    Gabapentin Hallucinations    Metoprolol Shortness Of Breath    Mobic [Meloxicam] Shortness Of Breath    Penicillin V Potassium Rash    Sulfamethoxazole-Trimethoprim Rash    Amlodipine Besylate Unknown - Low Severity    Bystolic [Nebivolol Hcl] Headache    Cefprozil Itching    Hydrocodone Nausea And Vomiting    Hydrocodone-Acetaminophen Nausea And Vomiting    Lisinopril Cough    Sulfa Antibiotics Hallucinations    Tizanidine Hcl Hallucinations    Valsartan Itching and Nausea And Vomiting    Penicillins Rash       Review of Systems   Constitutional:  Negative for activity change, appetite change, fatigue and fever.   HENT:  Negative for ear pain, swollen glands and voice change.    Eyes:  Negative for visual disturbance.   Respiratory:  Negative for shortness of breath and wheezing.    Cardiovascular:  Positive for leg swelling. Negative for chest pain.   Gastrointestinal:  Negative for abdominal pain, blood in stool, constipation, diarrhea, nausea and vomiting.  "  Endocrine: Negative for polydipsia and polyuria.   Genitourinary:  Negative for dysuria, frequency and hematuria.   Musculoskeletal:  Negative for joint swelling, neck pain and neck stiffness.   Skin:  Negative for rash and wound.   Neurological:  Negative for weakness, numbness and headache.   Psychiatric/Behavioral:  Negative for suicidal ideas and depressed mood.      I have reviewed and confirmed the accuracy of the ROS as documented by the MA/LPN/RN Jesse Renteria MD    Objective   Visit Vitals  /92 (BP Location: Right arm, Patient Position: Sitting, Cuff Size: Large Adult)   Pulse 69   Resp 20   Ht 165.1 cm (65\")   Wt 86.2 kg (190 lb)   SpO2 99%   BMI 31.62 kg/m²      **  Physical Exam  Constitutional:       Appearance: She is well-developed.   HENT:      Head: Normocephalic and atraumatic.      Right Ear: External ear normal.      Left Ear: External ear normal.      Nose: Nose normal.   Eyes:      Pupils: Pupils are equal, round, and reactive to light.   Cardiovascular:      Rate and Rhythm: Normal rate and regular rhythm.      Heart sounds: Normal heart sounds.   Pulmonary:      Effort: Pulmonary effort is normal.      Breath sounds: Normal breath sounds.   Abdominal:      General: Bowel sounds are normal.      Palpations: Abdomen is soft.   Musculoskeletal:         General: Normal range of motion.      Cervical back: Normal range of motion and neck supple.   Skin:     General: Skin is warm and dry.   Neurological:      Mental Status: She is alert and oriented to person, place, and time.   Psychiatric:         Behavior: Behavior normal.         Thought Content: Thought content normal.         Judgment: Judgment normal.       Derm Physical Exam  Ortho Exam   Neurologic Exam     Mental Status   Oriented to person, place, and time.     Cranial Nerves     CN III, IV, VI   Pupils are equal, round, and reactive to light.         Diagnoses and all orders for this visit:    1. Localized edema " (Primary)  Comments:  will see how she does with aldactone  Orders:  -     spironolactone (Aldactone) 25 MG tablet; Take 1 tablet by mouth 2 (Two) Times a Day.  Dispense: 60 tablet; Refill: 0    2. Benign hypertension  Comments:  add aldactone cautiously  Orders:  -     spironolactone (Aldactone) 25 MG tablet; Take 1 tablet by mouth 2 (Two) Times a Day.  Dispense: 60 tablet; Refill: 0    3. Acute conjunctivitis of both eyes, unspecified acute conjunctivitis type  -     erythromycin (ROMYCIN) 5 MG/GM ophthalmic ointment; Apply  to eye(s) as directed by provider 3 (Three) Times a Day.  Dispense: 1 each; Refill: 0    4. Class 1 obesity due to excess calories with serious comorbidity and body mass index (BMI) of 31.0 to 31.9 in adult    5. Acquired hypothyroidism  Assessment & Plan:  Continue new dose of synthroid, recheck in 2 months       Try aldactone up to 25 mg bid   Follow up in 1 month for reevaluation, sooner for concerns.   Continue synthroid, recheck TSH in 2 months            Expected course, medications, and adverse effects discussed as appropriate.  Call or return if worsening or persistent symptoms.     This document is intended for medical professional use only.   Electronically signed by Jesse Renteria MD on 04/12/2024. This content may not have been proofread.

## 2024-04-12 ENCOUNTER — OFFICE VISIT (OUTPATIENT)
Dept: FAMILY MEDICINE CLINIC | Facility: CLINIC | Age: 64
End: 2024-04-12
Payer: COMMERCIAL

## 2024-04-12 VITALS
SYSTOLIC BLOOD PRESSURE: 144 MMHG | OXYGEN SATURATION: 99 % | HEART RATE: 69 BPM | DIASTOLIC BLOOD PRESSURE: 92 MMHG | WEIGHT: 190 LBS | RESPIRATION RATE: 20 BRPM | BODY MASS INDEX: 31.65 KG/M2 | HEIGHT: 65 IN

## 2024-04-12 DIAGNOSIS — R60.0 LOCALIZED EDEMA: ICD-10-CM

## 2024-04-12 DIAGNOSIS — I10 BENIGN HYPERTENSION: ICD-10-CM

## 2024-04-12 DIAGNOSIS — H10.33 ACUTE CONJUNCTIVITIS OF BOTH EYES, UNSPECIFIED ACUTE CONJUNCTIVITIS TYPE: ICD-10-CM

## 2024-04-12 DIAGNOSIS — E03.9 ACQUIRED HYPOTHYROIDISM: ICD-10-CM

## 2024-04-12 DIAGNOSIS — E66.09 CLASS 1 OBESITY DUE TO EXCESS CALORIES WITH SERIOUS COMORBIDITY AND BODY MASS INDEX (BMI) OF 31.0 TO 31.9 IN ADULT: ICD-10-CM

## 2024-04-12 DIAGNOSIS — R60.0 LOCALIZED EDEMA: Primary | ICD-10-CM

## 2024-04-12 PROCEDURE — 99214 OFFICE O/P EST MOD 30 MIN: CPT | Performed by: FAMILY MEDICINE

## 2024-04-12 RX ORDER — SPIRONOLACTONE 25 MG/1
25 TABLET ORAL 2 TIMES DAILY
Qty: 60 TABLET | Refills: 0 | Status: SHIPPED | OUTPATIENT
Start: 2024-04-12

## 2024-04-12 RX ORDER — SPIRONOLACTONE 25 MG/1
25 TABLET ORAL 2 TIMES DAILY
Qty: 180 TABLET | OUTPATIENT
Start: 2024-04-12

## 2024-04-12 RX ORDER — ERYTHROMYCIN 5 MG/G
OINTMENT OPHTHALMIC 3 TIMES DAILY
Qty: 1 EACH | Refills: 0 | Status: SHIPPED | OUTPATIENT
Start: 2024-04-12

## 2024-05-13 ENCOUNTER — OFFICE (AMBULATORY)
Dept: URBAN - METROPOLITAN AREA CLINIC 64 | Facility: CLINIC | Age: 64
End: 2024-05-13
Payer: MEDICARE

## 2024-05-13 VITALS
DIASTOLIC BLOOD PRESSURE: 86 MMHG | WEIGHT: 181 LBS | HEIGHT: 65 IN | SYSTOLIC BLOOD PRESSURE: 132 MMHG | HEART RATE: 69 BPM

## 2024-05-13 DIAGNOSIS — R11.0 NAUSEA: ICD-10-CM

## 2024-05-13 DIAGNOSIS — R14.0 ABDOMINAL DISTENSION (GASEOUS): ICD-10-CM

## 2024-05-13 DIAGNOSIS — K31.84 GASTROPARESIS: ICD-10-CM

## 2024-05-13 PROCEDURE — 99213 OFFICE O/P EST LOW 20 MIN: CPT | Performed by: NURSE PRACTITIONER

## 2024-05-13 RX ORDER — PANTOPRAZOLE 40 MG/1
80 TABLET, DELAYED RELEASE ORAL
Qty: 180 | Refills: 3 | Status: ACTIVE

## 2024-05-13 RX ORDER — ERYTHROMYCIN 250 MG/1
TABLET, FILM COATED ORAL
Qty: 120 | Refills: 3 | Status: ACTIVE

## 2024-05-20 DIAGNOSIS — R60.0 LOCALIZED EDEMA: ICD-10-CM

## 2024-05-20 DIAGNOSIS — I10 BENIGN HYPERTENSION: ICD-10-CM

## 2024-05-21 DIAGNOSIS — I10 BENIGN HYPERTENSION: ICD-10-CM

## 2024-05-21 RX ORDER — LOSARTAN POTASSIUM AND HYDROCHLOROTHIAZIDE 12.5; 5 MG/1; MG/1
1 TABLET ORAL DAILY
Qty: 90 TABLET | Refills: 3 | Status: SHIPPED | OUTPATIENT
Start: 2024-05-21

## 2024-05-21 RX ORDER — SPIRONOLACTONE 25 MG/1
25 TABLET ORAL 2 TIMES DAILY
Qty: 180 TABLET | Refills: 3 | Status: SHIPPED | OUTPATIENT
Start: 2024-05-21 | End: 2024-05-24 | Stop reason: SDUPTHER

## 2024-05-24 ENCOUNTER — OFFICE VISIT (OUTPATIENT)
Dept: FAMILY MEDICINE CLINIC | Facility: CLINIC | Age: 64
End: 2024-05-24
Payer: MEDICARE

## 2024-05-24 VITALS
TEMPERATURE: 97.3 F | OXYGEN SATURATION: 98 % | HEART RATE: 81 BPM | DIASTOLIC BLOOD PRESSURE: 86 MMHG | WEIGHT: 184 LBS | HEIGHT: 65 IN | SYSTOLIC BLOOD PRESSURE: 130 MMHG | RESPIRATION RATE: 18 BRPM | BODY MASS INDEX: 30.66 KG/M2

## 2024-05-24 DIAGNOSIS — E03.9 ACQUIRED HYPOTHYROIDISM: ICD-10-CM

## 2024-05-24 DIAGNOSIS — M19.90 ARTHRITIS: Primary | ICD-10-CM

## 2024-05-24 DIAGNOSIS — R60.0 LOCALIZED EDEMA: ICD-10-CM

## 2024-05-24 DIAGNOSIS — I10 BENIGN HYPERTENSION: ICD-10-CM

## 2024-05-24 DIAGNOSIS — E03.9 HYPOTHYROIDISM, UNSPECIFIED TYPE: ICD-10-CM

## 2024-05-24 PROCEDURE — 99214 OFFICE O/P EST MOD 30 MIN: CPT | Performed by: FAMILY MEDICINE

## 2024-05-24 PROCEDURE — 1111F DSCHRG MED/CURRENT MED MERGE: CPT | Performed by: FAMILY MEDICINE

## 2024-05-24 PROCEDURE — 3079F DIAST BP 80-89 MM HG: CPT | Performed by: FAMILY MEDICINE

## 2024-05-24 PROCEDURE — 1126F AMNT PAIN NOTED NONE PRSNT: CPT | Performed by: FAMILY MEDICINE

## 2024-05-24 PROCEDURE — 3075F SYST BP GE 130 - 139MM HG: CPT | Performed by: FAMILY MEDICINE

## 2024-05-24 RX ORDER — LEVOTHYROXINE SODIUM 112 UG/1
112 TABLET ORAL DAILY
Qty: 90 TABLET | Refills: 3 | Status: SHIPPED | OUTPATIENT
Start: 2024-05-24

## 2024-05-24 RX ORDER — PIROXICAM 10 MG/1
10 CAPSULE ORAL DAILY
Qty: 30 CAPSULE | Refills: 1 | Status: SHIPPED | OUTPATIENT
Start: 2024-05-24

## 2024-05-24 RX ORDER — SPIRONOLACTONE 25 MG/1
25 TABLET ORAL 2 TIMES DAILY
Qty: 180 TABLET | Refills: 1 | Status: SHIPPED | OUTPATIENT
Start: 2024-05-24

## 2024-05-24 NOTE — PROGRESS NOTES
Subjective   Irma Dias is a 63 y.o. female.   Chief Complaint   Patient presents with    Edema       History of Present Illness  BP better  Edema better, takes aldactone intermittently  Mobic causes nightmares, would like to try something different     Started increased dose of synthroid as TSH was out of range  Las labs reviewed.       Hypertension  This is a chronic problem. The problem is unchanged. The problem is uncontrolled. Pertinent negatives include no chest pain, neck pain or shortness of breath. Current antihypertension treatment includes angiotensin blockers and diuretics. The current treatment provides moderate improvement.        The following portions of the patient's history were reviewed and updated as appropriate: allergies, current medications, past family history, past medical history, past social history, past surgical history, and problem list.    Patient Active Problem List   Diagnosis    Benign hypertension    Ram's esophagus    Allergic rhinitis    Hypothyroidism    Mild intermittent asthma without complication    Menopausal syndrome    Acute reaction to stress    Mixed hyperlipidemia    Gilbert's syndrome    Genital herpes simplex    Acute non-recurrent maxillary sinusitis    Astigmatism    Chronic maxillary sinusitis    Class 1 obesity due to excess calories with serious comorbidity and body mass index (BMI) of 31.0 to 31.9 in adult    Personal history of endometriosis    Menopausal symptoms    History of total hysterectomy with bilateral salpingo-oophorectomy (BSO)    Palpitations       Current Outpatient Medications on File Prior to Visit   Medication Sig Dispense Refill    Auvi-Q 0.3 MG/0.3ML solution auto-injector injection       erythromycin (ROMYCIN) 5 MG/GM ophthalmic ointment Apply  to eye(s) as directed by provider 3 (Three) Times a Day. 1 each 0    erythromycin base (E-MYCIN) 250 MG tablet Take 1 tablet by mouth 4 (Four) Times a Day.      losartan-hydrochlorothiazide  (HYZAAR) 50-12.5 MG per tablet TAKE 1 TABLET BY MOUTH DAILY 90 tablet 3    montelukast (SINGULAIR) 10 MG tablet Take 1 tablet by mouth Daily With Breakfast. 90 tablet 3    pantoprazole (PROTONIX) 40 MG EC tablet Take 1 tablet by mouth 2 (Two) Times a Day. 180 tablet 3    [DISCONTINUED] levothyroxine (SYNTHROID, LEVOTHROID) 112 MCG tablet Take 1 tablet by mouth Daily. 90 tablet 1    [DISCONTINUED] spironolactone (ALDACTONE) 25 MG tablet TAKE 1 TABLET BY MOUTH TWICE DAILY 180 tablet 3    [DISCONTINUED] meloxicam (MOBIC) 15 MG tablet Take 1 tablet by mouth Daily. (Patient not taking: Reported on 5/24/2024)       No current facility-administered medications on file prior to visit.     Current outpatient and discharge medications have been reconciled for the patient.  Reviewed by: Jesse Renteria MD      Allergies   Allergen Reactions    Codeine Nausea Only and Hallucinations    Gabapentin Hallucinations    Metoprolol Shortness Of Breath    Mobic [Meloxicam] Other (See Comments)     nightmares    Penicillin V Potassium Rash    Sulfamethoxazole-Trimethoprim Rash    Amlodipine Besylate Unknown - Low Severity    Bystolic [Nebivolol Hcl] Headache    Cefprozil Itching    Hydrocodone Nausea And Vomiting    Hydrocodone-Acetaminophen Nausea And Vomiting    Lisinopril Cough    Sulfa Antibiotics Hallucinations    Tizanidine Hcl Hallucinations    Valsartan Itching and Nausea And Vomiting    Penicillins Rash       Review of Systems   Constitutional:  Negative for activity change, appetite change, fatigue and fever.   HENT:  Negative for ear pain, swollen glands and voice change.    Eyes:  Negative for visual disturbance.   Respiratory:  Negative for shortness of breath and wheezing.    Cardiovascular:  Negative for chest pain and leg swelling.   Gastrointestinal:  Negative for abdominal pain, blood in stool, constipation, diarrhea, nausea and vomiting.   Endocrine: Negative for polydipsia and polyuria.   Genitourinary:   "Negative for dysuria, frequency and hematuria.   Musculoskeletal:  Positive for arthralgias. Negative for joint swelling, neck pain and neck stiffness.   Skin:  Negative for rash and wound.   Neurological:  Negative for weakness, numbness and headache.   Psychiatric/Behavioral:  Negative for suicidal ideas and depressed mood.      I have reviewed and confirmed the accuracy of the ROS as documented by the MA/LPN/RN Jesse Renteria MD    Objective   Visit Vitals  /86 (BP Location: Right arm, Patient Position: Sitting, Cuff Size: Adult)   Pulse 81   Temp 97.3 °F (36.3 °C) (Temporal)   Resp 18   Ht 165.1 cm (65\")   Wt 83.5 kg (184 lb)   SpO2 98%   BMI 30.62 kg/m²      **  Physical Exam  Constitutional:       Appearance: She is well-developed.   HENT:      Head: Normocephalic and atraumatic.      Right Ear: External ear normal.      Left Ear: External ear normal.      Nose: Nose normal.   Eyes:      Pupils: Pupils are equal, round, and reactive to light.   Cardiovascular:      Rate and Rhythm: Normal rate and regular rhythm.      Heart sounds: Normal heart sounds.   Pulmonary:      Effort: Pulmonary effort is normal.      Breath sounds: Normal breath sounds.   Abdominal:      General: Bowel sounds are normal.      Palpations: Abdomen is soft.   Musculoskeletal:         General: Normal range of motion.      Cervical back: Normal range of motion and neck supple.   Skin:     General: Skin is warm and dry.   Neurological:      Mental Status: She is alert and oriented to person, place, and time.   Psychiatric:         Behavior: Behavior normal.         Thought Content: Thought content normal.         Judgment: Judgment normal.       Derm Physical Exam  Ortho Exam   Neurologic Exam     Mental Status   Oriented to person, place, and time.     Cranial Nerves     CN III, IV, VI   Pupils are equal, round, and reactive to light.         Diagnoses and all orders for this visit:    1. Arthritis (Primary)  -     piroxicam " (Feldene) 10 MG capsule; Take 1 capsule by mouth Daily.  Dispense: 30 capsule; Refill: 1    2. Localized edema  Comments:  will see how she does with aldactone  Orders:  -     spironolactone (ALDACTONE) 25 MG tablet; Take 1 tablet by mouth 2 (Two) Times a Day.  Dispense: 180 tablet; Refill: 1  -     Basic Metabolic Panel    3. Benign hypertension  Comments:  add aldactone cautiously  Orders:  -     spironolactone (ALDACTONE) 25 MG tablet; Take 1 tablet by mouth 2 (Two) Times a Day.  Dispense: 180 tablet; Refill: 1  -     Basic Metabolic Panel    4. Acquired hypothyroidism  -     TSH    5. Hypothyroidism, unspecified type  -     levothyroxine (SYNTHROID, LEVOTHROID) 112 MCG tablet; Take 1 tablet by mouth Daily.  Dispense: 90 tablet; Refill: 3               Expected course, medications, and adverse effects discussed as appropriate.  Call or return if worsening or persistent symptoms.     This document is intended for medical professional use only.   Electronically signed by Jesse Renteria MD on 05/24/2024. This content may not have been proofread.

## 2024-05-25 DIAGNOSIS — E03.9 HYPOTHYROIDISM, UNSPECIFIED TYPE: ICD-10-CM

## 2024-05-25 LAB
BUN SERPL-MCNC: 12 MG/DL (ref 8–27)
BUN/CREAT SERPL: 11 (ref 12–28)
CALCIUM SERPL-MCNC: 9.6 MG/DL (ref 8.7–10.3)
CHLORIDE SERPL-SCNC: 102 MMOL/L (ref 96–106)
CO2 SERPL-SCNC: 26 MMOL/L (ref 20–29)
CREAT SERPL-MCNC: 1.14 MG/DL (ref 0.57–1)
EGFRCR SERPLBLD CKD-EPI 2021: 53 ML/MIN/1.73
GLUCOSE SERPL-MCNC: 89 MG/DL (ref 70–99)
POTASSIUM SERPL-SCNC: 4.2 MMOL/L (ref 3.5–5.2)
SODIUM SERPL-SCNC: 140 MMOL/L (ref 134–144)
TSH SERPL DL<=0.005 MIU/L-ACNC: 1.05 UIU/ML (ref 0.45–4.5)

## 2024-05-28 ENCOUNTER — PATIENT MESSAGE (OUTPATIENT)
Dept: FAMILY MEDICINE CLINIC | Facility: CLINIC | Age: 64
End: 2024-05-28
Payer: MEDICARE

## 2024-05-28 ENCOUNTER — TELEPHONE (OUTPATIENT)
Dept: FAMILY MEDICINE CLINIC | Facility: CLINIC | Age: 64
End: 2024-05-28
Payer: MEDICARE

## 2024-05-28 RX ORDER — LEVOTHYROXINE SODIUM 0.1 MG/1
100 TABLET ORAL DAILY
Qty: 90 TABLET | Refills: 3 | OUTPATIENT
Start: 2024-05-28

## 2024-06-19 DIAGNOSIS — M19.90 ARTHRITIS: ICD-10-CM

## 2024-06-20 RX ORDER — PIROXICAM 10 MG/1
10 CAPSULE ORAL DAILY
Qty: 30 CAPSULE | Refills: 1 | Status: SHIPPED | OUTPATIENT
Start: 2024-06-20

## 2024-11-13 ENCOUNTER — OFFICE (AMBULATORY)
Age: 64
End: 2024-11-13
Payer: COMMERCIAL

## 2024-11-13 ENCOUNTER — OFFICE (AMBULATORY)
Dept: URBAN - METROPOLITAN AREA CLINIC 64 | Facility: CLINIC | Age: 64
End: 2024-11-13
Payer: COMMERCIAL

## 2024-11-13 VITALS
DIASTOLIC BLOOD PRESSURE: 92 MMHG | WEIGHT: 182.4 LBS | SYSTOLIC BLOOD PRESSURE: 157 MMHG | DIASTOLIC BLOOD PRESSURE: 95 MMHG | WEIGHT: 182.4 LBS | SYSTOLIC BLOOD PRESSURE: 158 MMHG | HEIGHT: 65 IN | WEIGHT: 182.4 LBS | HEART RATE: 66 BPM | WEIGHT: 182.4 LBS | SYSTOLIC BLOOD PRESSURE: 157 MMHG | DIASTOLIC BLOOD PRESSURE: 95 MMHG | SYSTOLIC BLOOD PRESSURE: 158 MMHG | HEART RATE: 66 BPM | HEART RATE: 66 BPM | SYSTOLIC BLOOD PRESSURE: 157 MMHG | WEIGHT: 182.4 LBS | DIASTOLIC BLOOD PRESSURE: 95 MMHG | SYSTOLIC BLOOD PRESSURE: 157 MMHG | DIASTOLIC BLOOD PRESSURE: 92 MMHG | DIASTOLIC BLOOD PRESSURE: 95 MMHG | SYSTOLIC BLOOD PRESSURE: 158 MMHG | HEIGHT: 65 IN | DIASTOLIC BLOOD PRESSURE: 92 MMHG | HEIGHT: 65 IN | SYSTOLIC BLOOD PRESSURE: 157 MMHG | HEIGHT: 65 IN | DIASTOLIC BLOOD PRESSURE: 95 MMHG | DIASTOLIC BLOOD PRESSURE: 92 MMHG | HEART RATE: 66 BPM | HEART RATE: 66 BPM | SYSTOLIC BLOOD PRESSURE: 158 MMHG | DIASTOLIC BLOOD PRESSURE: 95 MMHG | HEIGHT: 65 IN | WEIGHT: 182.4 LBS | DIASTOLIC BLOOD PRESSURE: 95 MMHG | HEIGHT: 65 IN | DIASTOLIC BLOOD PRESSURE: 92 MMHG | SYSTOLIC BLOOD PRESSURE: 158 MMHG | HEART RATE: 66 BPM | HEIGHT: 65 IN | SYSTOLIC BLOOD PRESSURE: 157 MMHG | SYSTOLIC BLOOD PRESSURE: 158 MMHG | SYSTOLIC BLOOD PRESSURE: 158 MMHG | DIASTOLIC BLOOD PRESSURE: 92 MMHG | HEART RATE: 66 BPM | SYSTOLIC BLOOD PRESSURE: 157 MMHG | WEIGHT: 182.4 LBS | DIASTOLIC BLOOD PRESSURE: 92 MMHG

## 2024-11-13 DIAGNOSIS — K21.9 GASTRO-ESOPHAGEAL REFLUX DISEASE WITHOUT ESOPHAGITIS: ICD-10-CM

## 2024-11-13 DIAGNOSIS — K31.84 GASTROPARESIS: ICD-10-CM

## 2024-11-13 PROCEDURE — 99212 OFFICE O/P EST SF 10 MIN: CPT | Performed by: NURSE PRACTITIONER

## 2024-11-25 ENCOUNTER — OFFICE VISIT (OUTPATIENT)
Dept: FAMILY MEDICINE CLINIC | Facility: CLINIC | Age: 64
End: 2024-11-25
Payer: MEDICARE

## 2024-11-25 VITALS
HEIGHT: 65 IN | BODY MASS INDEX: 30.16 KG/M2 | HEART RATE: 86 BPM | DIASTOLIC BLOOD PRESSURE: 82 MMHG | WEIGHT: 181 LBS | RESPIRATION RATE: 20 BRPM | OXYGEN SATURATION: 100 % | SYSTOLIC BLOOD PRESSURE: 138 MMHG

## 2024-11-25 DIAGNOSIS — I10 BENIGN HYPERTENSION: Primary | ICD-10-CM

## 2024-11-25 DIAGNOSIS — E03.9 ACQUIRED HYPOTHYROIDISM: ICD-10-CM

## 2024-11-25 DIAGNOSIS — Z23 NEED FOR VACCINATION: ICD-10-CM

## 2024-11-25 DIAGNOSIS — Z12.31 ENCOUNTER FOR SCREENING MAMMOGRAM FOR MALIGNANT NEOPLASM OF BREAST: ICD-10-CM

## 2024-11-25 DIAGNOSIS — Z23 NEED FOR IMMUNIZATION AGAINST INFLUENZA: ICD-10-CM

## 2024-11-25 DIAGNOSIS — Z78.0 POSTMENOPAUSE: ICD-10-CM

## 2024-11-25 DIAGNOSIS — E78.2 MIXED HYPERLIPIDEMIA: ICD-10-CM

## 2024-11-25 DIAGNOSIS — J32.9 RECURRENT SINUSITIS: ICD-10-CM

## 2024-11-25 DIAGNOSIS — Z00.00 WELCOME TO MEDICARE PREVENTIVE VISIT: ICD-10-CM

## 2024-11-25 PROCEDURE — 1126F AMNT PAIN NOTED NONE PRSNT: CPT | Performed by: FAMILY MEDICINE

## 2024-11-25 PROCEDURE — G0402 INITIAL PREVENTIVE EXAM: HCPCS | Performed by: FAMILY MEDICINE

## 2024-11-25 PROCEDURE — 3075F SYST BP GE 130 - 139MM HG: CPT | Performed by: FAMILY MEDICINE

## 2024-11-25 PROCEDURE — G0009 ADMIN PNEUMOCOCCAL VACCINE: HCPCS | Performed by: FAMILY MEDICINE

## 2024-11-25 PROCEDURE — G0008 ADMIN INFLUENZA VIRUS VAC: HCPCS | Performed by: FAMILY MEDICINE

## 2024-11-25 PROCEDURE — 3079F DIAST BP 80-89 MM HG: CPT | Performed by: FAMILY MEDICINE

## 2024-11-25 PROCEDURE — 1170F FXNL STATUS ASSESSED: CPT | Performed by: FAMILY MEDICINE

## 2024-11-25 PROCEDURE — 90662 IIV NO PRSV INCREASED AG IM: CPT | Performed by: FAMILY MEDICINE

## 2024-11-25 PROCEDURE — 90677 PCV20 VACCINE IM: CPT | Performed by: FAMILY MEDICINE

## 2024-11-25 RX ORDER — AZITHROMYCIN 250 MG/1
TABLET, FILM COATED ORAL
Qty: 6 TABLET | Refills: 2 | Status: SHIPPED | OUTPATIENT
Start: 2024-11-25

## 2024-11-25 RX ORDER — OMEGA-3S/DHA/EPA/FISH OIL/D3 300MG-1000
400 CAPSULE ORAL DAILY
COMMUNITY

## 2024-11-25 RX ORDER — MELOXICAM 15 MG/1
1 TABLET ORAL DAILY
COMMUNITY
Start: 2024-10-03

## 2024-11-25 NOTE — PROGRESS NOTES
Subjective   The ABCs of the Annual Wellness Visit  Medicare Wellness Visit      Irma Dias is a 64 y.o. patient who presents for a Medicare Wellness Visit.    The following portions of the patient's history were reviewed and   updated as appropriate: allergies, current medications, past family history, past medical history, past social history, past surgical history, and problem list.    Compared to one year ago, the patient's physical   health is the same.  Compared to one year ago, the patient's mental   health is the same.    Recent Hospitalizations:  She was not admitted to the hospital during the last year.     Current Medical Providers:  Patient Care Team:  Jesse Renteria MD as PCP - General (Family Medicine)  Xin Snow MD as Consulting Physician (Family Medicine)    Outpatient Medications Prior to Visit   Medication Sig Dispense Refill    Auvi-Q 0.3 MG/0.3ML solution auto-injector injection       Cholecalciferol 10 MCG (400 UNIT) tablet Take 1 tablet by mouth Daily.      erythromycin base (E-MYCIN) 250 MG tablet Take 1 tablet by mouth 4 (Four) Times a Day.      levothyroxine (SYNTHROID, LEVOTHROID) 112 MCG tablet Take 1 tablet by mouth Daily. 90 tablet 3    losartan-hydrochlorothiazide (HYZAAR) 50-12.5 MG per tablet TAKE 1 TABLET BY MOUTH DAILY 90 tablet 3    meloxicam (MOBIC) 15 MG tablet Take 1 tablet by mouth Daily.      montelukast (SINGULAIR) 10 MG tablet Take 1 tablet by mouth Daily With Breakfast. 90 tablet 3    pantoprazole (PROTONIX) 40 MG EC tablet Take 1 tablet by mouth 2 (Two) Times a Day. 180 tablet 3    piroxicam (FELDENE) 10 MG capsule TAKE 1 CAPSULE BY MOUTH DAILY 30 capsule 1    spironolactone (ALDACTONE) 25 MG tablet Take 1 tablet by mouth 2 (Two) Times a Day. 180 tablet 1    erythromycin (ROMYCIN) 5 MG/GM ophthalmic ointment Apply  to eye(s) as directed by provider 3 (Three) Times a Day. 1 each 0     No facility-administered medications prior to visit.     No  "opioid medication identified on active medication list. I have reviewed chart for other potential  high risk medication/s and harmful drug interactions in the elderly.      Aspirin is not on active medication list.  Aspirin use is not indicated based on review of current medical condition/s. Risk of harm outweighs potential benefits.  .    Patient Active Problem List   Diagnosis    Benign hypertension    Ram's esophagus    Allergic rhinitis    Hypothyroidism    Mild intermittent asthma without complication    Menopausal syndrome    Acute reaction to stress    Mixed hyperlipidemia    Gilbert's syndrome    Genital herpes simplex    Acute non-recurrent maxillary sinusitis    Astigmatism    Chronic maxillary sinusitis    Class 1 obesity due to excess calories with serious comorbidity and body mass index (BMI) of 31.0 to 31.9 in adult    Personal history of endometriosis    Menopausal symptoms    History of total hysterectomy with bilateral salpingo-oophorectomy (BSO)    Palpitations     Advance Care Planning Advance Directive is not on file.  ACP discussion was held with the patient during this visit. Patient has an advance directive (not in EMR), copy requested.            Objective   Vitals:    11/25/24 0953 11/25/24 1010   BP: 144/86 138/82   BP Location: Right arm    Patient Position: Sitting    Cuff Size: Large Adult    Pulse: 86    Resp: 20    SpO2: 100%    Weight: 82.1 kg (181 lb)    Height: 165.1 cm (65\")    PainSc: 0-No pain        Estimated body mass index is 30.12 kg/m² as calculated from the following:    Height as of this encounter: 165.1 cm (65\").    Weight as of this encounter: 82.1 kg (181 lb).    BMI is >= 30 and <35. (Class 1 Obesity). The following options were offered after discussion;: exercise counseling/recommendations         Gait and Balance Evaluation:  Normal  Does the patient have evidence of cognitive impairment? No                                                                          "                      Health  Risk Assessment    Smoking Status:  Social History     Tobacco Use   Smoking Status Never    Passive exposure: Never   Smokeless Tobacco Never     Alcohol Consumption:  Social History     Substance and Sexual Activity   Alcohol Use No       Fall Risk Screen  RADHAADI Fall Risk Assessment was completed, and patient is at LOW risk for falls.Assessment completed on:2024    Depression Screening   Little interest or pleasure in doing things? Not at all   Feeling down, depressed, or hopeless? Not at all   PHQ-2 Total Score 0      Health Habits and Functional and Cognitive Screenin/25/2024     9:57 AM   Functional & Cognitive Status   Do you have difficulty preparing food and eating? No   Do you have difficulty bathing yourself, getting dressed or grooming yourself? No   Do you have difficulty using the toilet? No   Do you have difficulty moving around from place to place? No   Do you have trouble with steps or getting out of a bed or a chair? No   Current Diet Well Balanced Diet   Dental Exam Up to date   Eye Exam Up to date   Exercise (times per week) 7 times per week   Current Exercises Include Yard Work;Walking   Do you need help using the phone?  No   Are you deaf or do you have serious difficulty hearing?  No   Do you need help to go to places out of walking distance? No   Do you need help shopping? No   Do you need help preparing meals?  No   Do you need help with housework?  No   Do you need help with laundry? No   Do you need help taking your medications? No   Do you need help managing money? No   Do you ever drive or ride in a car without wearing a seat belt? No   Have you felt unusual stress, anger or loneliness in the last month? No   Who do you live with? Spouse   If you need help, do you have trouble finding someone available to you? No   Have you been bothered in the last four weeks by sexual problems? No   Do you have difficulty concentrating, remembering or making  decisions? No           Visual Acuity:  Vision Screening    Right eye Left eye Both eyes   Without correction      With correction 20/40 20/25 20/25     Age-appropriate Screening Schedule:  Refer to the list below for future screening recommendations based on patient's age, sex and/or medical conditions. Orders for these recommended tests are listed in the plan section. The patient has been provided with a written plan.    Health Maintenance List  Health Maintenance   Topic Date Due    DXA SCAN  Never done    ZOSTER VACCINE (1 of 2) Never done    MAMMOGRAM  09/14/2023    COVID-19 Vaccine (3 - 2024-25 season) 09/01/2024    LIPID PANEL  04/04/2025    ANNUAL WELLNESS VISIT  11/25/2025    BMI FOLLOWUP  11/25/2025    TDAP/TD VACCINES (3 - Td or Tdap) 12/22/2030    COLORECTAL CANCER SCREENING  10/20/2033    HEPATITIS C SCREENING  Completed    INFLUENZA VACCINE  Completed    Pneumococcal Vaccine 65+  Completed    PAP SMEAR  Discontinued                                                                                                                                                CMS Preventative Services Quick Reference  Risk Factors Identified During Encounter  None Identified    The above risks/problems have been discussed with the patient.  Pertinent information has been shared with the patient in the After Visit Summary.  An After Visit Summary and PPPS were made available to the patient.    Follow Up:   Next Medicare Wellness visit to be scheduled in 1 year.     Assessment & Plan  Benign hypertension  Hypertension is stable and controlled  Continue current treatment regimen.  Blood pressure will be reassessed in 6 months.    Orders:    CBC & Differential    Comprehensive Metabolic Panel    Welcome to Medicare preventive visit         Need for immunization against influenza    Orders:    Fluzone High-Dose 65+yrs (2733-8334)    Recurrent sinusitis  Takes antibiotics prn    Orders:    azithromycin (ZITHROMAX) 250 MG  tablet; Take 2 tablets the first day, then 1 tablet daily for 4 days.    Acquired hypothyroidism  Recheck   Orders:    TSH    Mixed hyperlipidemia   Lipid abnormalities are stable    Plan:  Continue same medication/s without change.      Discussed medication dosage, use, side effects, and goals of treatment in detail.    Counseled patient on lifestyle modifications to help control hyperlipidemia.       Postmenopause  Schedule dexa  Orders:    DEXA Bone Density Axial; Future    Encounter for screening mammogram for malignant neoplasm of breast  Mammo ordered   Orders:    Mammo Screening Digital Tomosynthesis Bilateral With CAD; Future    Need for vaccination    Orders:    Pneumococcal Conjugate Vaccine 20-Valent All     BMI is >= 30 and <35. (Class 1 Obesity). The following options were offered after discussion;: exercise counseling/recommendations   Findings discussed. All questions answered.  Follow up in 6 months for recheck, sooner for worsening symptoms or any concerns.    Follow Up:   Return in about 6 months (around 5/25/2025).

## 2024-11-25 NOTE — ASSESSMENT & PLAN NOTE
Lipid abnormalities are stable    Plan:  Continue same medication/s without change.      Discussed medication dosage, use, side effects, and goals of treatment in detail.    Counseled patient on lifestyle modifications to help control hyperlipidemia.

## 2024-11-25 NOTE — ASSESSMENT & PLAN NOTE
Hypertension is stable and controlled  Continue current treatment regimen.  Blood pressure will be reassessed in 6 months.    Orders:    CBC & Differential    Comprehensive Metabolic Panel

## 2024-11-26 ENCOUNTER — TELEPHONE (OUTPATIENT)
Dept: FAMILY MEDICINE CLINIC | Facility: CLINIC | Age: 64
End: 2024-11-26
Payer: MEDICARE

## 2024-11-26 DIAGNOSIS — R79.89 ELEVATED SERUM CREATININE: Primary | ICD-10-CM

## 2024-11-26 LAB
ALBUMIN SERPL-MCNC: 4.1 G/DL (ref 3.9–4.9)
ALP SERPL-CCNC: 67 IU/L (ref 44–121)
ALT SERPL-CCNC: 12 IU/L (ref 0–32)
AST SERPL-CCNC: 16 IU/L (ref 0–40)
BASOPHILS # BLD AUTO: 0 X10E3/UL (ref 0–0.2)
BASOPHILS NFR BLD AUTO: 1 %
BILIRUB SERPL-MCNC: 2.3 MG/DL (ref 0–1.2)
BUN SERPL-MCNC: 13 MG/DL (ref 8–27)
BUN/CREAT SERPL: 11 (ref 12–28)
CALCIUM SERPL-MCNC: 9.7 MG/DL (ref 8.7–10.3)
CHLORIDE SERPL-SCNC: 101 MMOL/L (ref 96–106)
CHOLEST SERPL-MCNC: 233 MG/DL (ref 100–199)
CHOLEST/HDLC SERPL: 3.3 RATIO (ref 0–4.4)
CO2 SERPL-SCNC: 26 MMOL/L (ref 20–29)
CREAT SERPL-MCNC: 1.22 MG/DL (ref 0.57–1)
EGFRCR SERPLBLD CKD-EPI 2021: 49 ML/MIN/1.73
EOSINOPHIL # BLD AUTO: 0.2 X10E3/UL (ref 0–0.4)
EOSINOPHIL NFR BLD AUTO: 4 %
ERYTHROCYTE [DISTWIDTH] IN BLOOD BY AUTOMATED COUNT: 12.3 % (ref 11.7–15.4)
GLOBULIN SER CALC-MCNC: 2.4 G/DL (ref 1.5–4.5)
GLUCOSE SERPL-MCNC: 91 MG/DL (ref 70–99)
HCT VFR BLD AUTO: 40.9 % (ref 34–46.6)
HDLC SERPL-MCNC: 70 MG/DL
HGB BLD-MCNC: 13.8 G/DL (ref 11.1–15.9)
IMM GRANULOCYTES # BLD AUTO: 0 X10E3/UL (ref 0–0.1)
IMM GRANULOCYTES NFR BLD AUTO: 0 %
LDLC SERPL CALC-MCNC: 151 MG/DL (ref 0–99)
LYMPHOCYTES # BLD AUTO: 1.6 X10E3/UL (ref 0.7–3.1)
LYMPHOCYTES NFR BLD AUTO: 27 %
MCH RBC QN AUTO: 31.4 PG (ref 26.6–33)
MCHC RBC AUTO-ENTMCNC: 33.7 G/DL (ref 31.5–35.7)
MCV RBC AUTO: 93 FL (ref 79–97)
MONOCYTES # BLD AUTO: 0.5 X10E3/UL (ref 0.1–0.9)
MONOCYTES NFR BLD AUTO: 9 %
NEUTROPHILS # BLD AUTO: 3.6 X10E3/UL (ref 1.4–7)
NEUTROPHILS NFR BLD AUTO: 59 %
PLATELET # BLD AUTO: 216 X10E3/UL (ref 150–450)
POTASSIUM SERPL-SCNC: 4 MMOL/L (ref 3.5–5.2)
PROT SERPL-MCNC: 6.5 G/DL (ref 6–8.5)
RBC # BLD AUTO: 4.4 X10E6/UL (ref 3.77–5.28)
SODIUM SERPL-SCNC: 139 MMOL/L (ref 134–144)
TRIGL SERPL-MCNC: 68 MG/DL (ref 0–149)
TSH SERPL DL<=0.005 MIU/L-ACNC: 0.7 UIU/ML (ref 0.45–4.5)
VLDLC SERPL CALC-MCNC: 12 MG/DL (ref 5–40)
WBC # BLD AUTO: 6 X10E3/UL (ref 3.4–10.8)

## 2024-11-26 NOTE — TELEPHONE ENCOUNTER
Spoke with pt. She states she has not been taking the Spironolactone for long time. Is there any other adjustment you want to make?

## 2024-11-26 NOTE — TELEPHONE ENCOUNTER
----- Message from Jesse Renteria sent at 11/26/2024 12:37 PM EST -----  Please notify patient that:   Kidney function up slightly, recc decrease spironolactone to once a day and recheck BMP in 1 month

## 2024-11-27 ENCOUNTER — TELEPHONE (OUTPATIENT)
Dept: FAMILY MEDICINE CLINIC | Facility: CLINIC | Age: 64
End: 2024-11-27
Payer: MEDICARE

## 2024-12-18 DIAGNOSIS — Z78.0 POSTMENOPAUSE: ICD-10-CM

## 2024-12-31 ENCOUNTER — OFFICE (AMBULATORY)
Dept: URBAN - METROPOLITAN AREA CLINIC 64 | Facility: CLINIC | Age: 64
End: 2024-12-31
Payer: MEDICARE

## 2024-12-31 ENCOUNTER — OFFICE (AMBULATORY)
Dept: URBAN - METROPOLITAN AREA CLINIC 64 | Facility: CLINIC | Age: 64
End: 2024-12-31

## 2024-12-31 DIAGNOSIS — K21.9 GASTRO-ESOPHAGEAL REFLUX DISEASE WITHOUT ESOPHAGITIS: ICD-10-CM

## 2024-12-31 PROCEDURE — 99426 PRIN CARE MGMT STAFF 1ST 30: CPT | Performed by: NURSE PRACTITIONER

## 2025-01-31 ENCOUNTER — OFFICE (AMBULATORY)
Dept: URBAN - METROPOLITAN AREA CLINIC 64 | Facility: CLINIC | Age: 65
End: 2025-01-31
Payer: MEDICARE

## 2025-01-31 DIAGNOSIS — K21.9 GASTRO-ESOPHAGEAL REFLUX DISEASE WITHOUT ESOPHAGITIS: ICD-10-CM

## 2025-01-31 PROCEDURE — 99426 PRIN CARE MGMT STAFF 1ST 30: CPT | Performed by: NURSE PRACTITIONER

## 2025-02-18 ENCOUNTER — OFFICE VISIT (OUTPATIENT)
Dept: FAMILY MEDICINE CLINIC | Facility: CLINIC | Age: 65
End: 2025-02-18
Payer: MEDICARE

## 2025-02-18 VITALS
HEART RATE: 88 BPM | HEIGHT: 65 IN | DIASTOLIC BLOOD PRESSURE: 84 MMHG | RESPIRATION RATE: 18 BRPM | OXYGEN SATURATION: 96 % | BODY MASS INDEX: 30.99 KG/M2 | TEMPERATURE: 97.7 F | SYSTOLIC BLOOD PRESSURE: 128 MMHG | WEIGHT: 186 LBS

## 2025-02-18 DIAGNOSIS — I10 BENIGN HYPERTENSION: ICD-10-CM

## 2025-02-18 DIAGNOSIS — E03.9 ACQUIRED HYPOTHYROIDISM: ICD-10-CM

## 2025-02-18 DIAGNOSIS — E66.09 CLASS 1 OBESITY DUE TO EXCESS CALORIES WITH SERIOUS COMORBIDITY AND BODY MASS INDEX (BMI) OF 31.0 TO 31.9 IN ADULT: ICD-10-CM

## 2025-02-18 DIAGNOSIS — J30.9 ALLERGIC RHINITIS, UNSPECIFIED SEASONALITY, UNSPECIFIED TRIGGER: ICD-10-CM

## 2025-02-18 DIAGNOSIS — E78.2 MIXED HYPERLIPIDEMIA: ICD-10-CM

## 2025-02-18 DIAGNOSIS — J30.1 SEASONAL ALLERGIC RHINITIS DUE TO POLLEN: ICD-10-CM

## 2025-02-18 DIAGNOSIS — E66.811 CLASS 1 OBESITY DUE TO EXCESS CALORIES WITH SERIOUS COMORBIDITY AND BODY MASS INDEX (BMI) OF 31.0 TO 31.9 IN ADULT: ICD-10-CM

## 2025-02-18 DIAGNOSIS — Z87.448 HISTORY OF RENAL INSUFFICIENCY: ICD-10-CM

## 2025-02-18 DIAGNOSIS — J45.20 MILD INTERMITTENT ASTHMA WITHOUT COMPLICATION: ICD-10-CM

## 2025-02-18 DIAGNOSIS — J32.0 CHRONIC MAXILLARY SINUSITIS: ICD-10-CM

## 2025-02-18 PROBLEM — N18.30 STAGE 3 CHRONIC KIDNEY DISEASE: Status: ACTIVE | Noted: 2025-02-18

## 2025-02-18 PROBLEM — N95.1 MENOPAUSAL SYNDROME: Status: RESOLVED | Noted: 2019-07-22 | Resolved: 2025-02-18

## 2025-02-18 PROBLEM — R00.2 PALPITATIONS: Status: RESOLVED | Noted: 2023-08-28 | Resolved: 2025-02-18

## 2025-02-18 PROCEDURE — 3079F DIAST BP 80-89 MM HG: CPT | Performed by: FAMILY MEDICINE

## 2025-02-18 PROCEDURE — 99214 OFFICE O/P EST MOD 30 MIN: CPT | Performed by: FAMILY MEDICINE

## 2025-02-18 PROCEDURE — 1159F MED LIST DOCD IN RCRD: CPT | Performed by: FAMILY MEDICINE

## 2025-02-18 PROCEDURE — 1160F RVW MEDS BY RX/DR IN RCRD: CPT | Performed by: FAMILY MEDICINE

## 2025-02-18 PROCEDURE — 1126F AMNT PAIN NOTED NONE PRSNT: CPT | Performed by: FAMILY MEDICINE

## 2025-02-18 PROCEDURE — 3074F SYST BP LT 130 MM HG: CPT | Performed by: FAMILY MEDICINE

## 2025-02-18 RX ORDER — ALBUTEROL SULFATE 90 UG/1
2 INHALANT RESPIRATORY (INHALATION) EVERY 4 HOURS PRN
Qty: 18 G | Refills: 12 | Status: SHIPPED | OUTPATIENT
Start: 2025-02-18

## 2025-02-18 RX ORDER — MONTELUKAST SODIUM 10 MG/1
10 TABLET ORAL
Qty: 90 TABLET | Refills: 3 | Status: SHIPPED | OUTPATIENT
Start: 2025-02-18

## 2025-02-18 RX ORDER — BUDESONIDE AND FORMOTEROL FUMARATE DIHYDRATE 160; 4.5 UG/1; UG/1
2 AEROSOL RESPIRATORY (INHALATION)
Qty: 10.2 G | Refills: 12 | Status: SHIPPED | OUTPATIENT
Start: 2025-02-18

## 2025-02-18 RX ORDER — LOSARTAN POTASSIUM 50 MG/1
50 TABLET ORAL DAILY
Qty: 30 TABLET | Refills: 12 | Status: SHIPPED | OUTPATIENT
Start: 2025-02-18

## 2025-02-18 RX ORDER — ALBUTEROL SULFATE 0.83 MG/ML
2.5 SOLUTION RESPIRATORY (INHALATION) EVERY 4 HOURS PRN
Qty: 30 ML | Refills: 12 | Status: SHIPPED | OUTPATIENT
Start: 2025-02-18

## 2025-02-18 RX ORDER — DOXYCYCLINE 100 MG/1
100 CAPSULE ORAL 2 TIMES DAILY
Qty: 60 CAPSULE | Refills: 1 | Status: SHIPPED | OUTPATIENT
Start: 2025-02-18

## 2025-02-18 NOTE — PROGRESS NOTES
Chief Complaint  URI and Hypertension    Subjective     CC  Problem List  Visit Diagnosis   Encounters  Notes  Medications  Labs  Result Review Imaging  Media    Irma Dias presents to [facility] for   URI   This is a new problem. The current episode started 1 to 4 weeks ago. The problem has been waxing and waning. There has been no fever. Associated symptoms include congestion, coughing, headaches, a plugged ear sensation, rhinorrhea, sinus pain, sneezing and wheezing. Pertinent negatives include no abdominal pain, chest pain, diarrhea, ear pain, joint pain, joint swelling, nausea, rash or vomiting. Treatments tried: Finished two rounds of Z-Pack and had prednisone at home.   Hypertension  This is a chronic problem. The current episode started more than 1 year ago. The problem has been stable since onset. The problem is controlled. Associated symptoms include headaches and shortness of breath. Pertinent negatives include no anxiety, blurred vision, chest pain, orthopnea, palpitations, peripheral edema or PND. There are no associated agents to hypertension. Past treatments include nothing. Current antihypertension treatment includes diuretics and angiotensin blockers. The current treatment provides mild improvement. There are no compliance problems.  There is no history of chronic renal disease.   Hyperlipidemia  This is a chronic problem. Recent lipid tests were reviewed and are high. Exacerbating diseases include obesity. She has no history of chronic renal disease or diabetes. Associated symptoms include shortness of breath. Pertinent negatives include no chest pain. The current treatment provides mild improvement of lipids.       Review of Systems   Constitutional:  Negative for appetite change and fever.   HENT:  Positive for congestion, rhinorrhea, sinus pressure (chronic improves with abx only to recurr) and sneezing. Negative for ear pain.    Eyes:  Negative for  "blurred vision.   Respiratory:  Positive for cough, shortness of breath and wheezing.    Cardiovascular:  Negative for chest pain, palpitations, orthopnea and PND.   Gastrointestinal:  Negative for abdominal pain, diarrhea, nausea and vomiting.   Endocrine: Negative for cold intolerance, heat intolerance, polydipsia and polyuria.   Musculoskeletal:  Negative for joint pain.   Skin:  Negative for rash.   Hematological:  Negative for adenopathy. Does not bruise/bleed easily.        Objective   Vital Signs:   /84 (BP Location: Right arm, Patient Position: Sitting, Cuff Size: Adult)   Pulse 88   Temp 97.7 °F (36.5 °C) (Temporal)   Resp 18   Ht 165.1 cm (65\")   Wt 84.4 kg (186 lb)   SpO2 96%   BMI 30.95 kg/m²     Physical Exam  Constitutional:       General: She is not in acute distress.  HENT:      Right Ear: Tympanic membrane normal.      Left Ear: Tympanic membrane normal.      Mouth/Throat:      Pharynx: Oropharynx is clear.      Comments: Moderate post nasal secretions.   Neck:      Thyroid: No thyromegaly.      Vascular: No JVD.   Cardiovascular:      Rate and Rhythm: Normal rate and regular rhythm.      Heart sounds: No murmur heard.  Pulmonary:      Effort: Pulmonary effort is normal.      Breath sounds: Normal breath sounds. No wheezing.   Musculoskeletal:      Cervical back: Neck supple.      Right lower leg: No edema.      Left lower leg: No edema.   Lymphadenopathy:      Cervical: No cervical adenopathy.   Skin:     Findings: No rash.   Neurological:      Mental Status: She is alert.        Result Review :Labs  Result Review  Imaging  Med Tab  Media                 Assessment and Plan CC Problem List  Visit Diagnosis  ROS  Review (Popup)  Health Maintenance  Quality  BestPractice  Medications  SmartSets  SnapShot Encounters  Media  Problem List Items Addressed This Visit          Unprioritized    Benign hypertension    Relevant Medications    losartan (COZAAR) 50 MG tablet    " Other Relevant Orders    CBC & Differential (Completed)    Allergic rhinitis    Current Assessment & Plan     Treat maximally pending spring and hx of chronic sinusitis, consider allergy referral. If sxs don't improve.           Relevant Medications    albuterol sulfate HFA (Ventolin HFA) 108 (90 Base) MCG/ACT inhaler    albuterol (PROVENTIL) (2.5 MG/3ML) 0.083% nebulizer solution    montelukast (SINGULAIR) 10 MG tablet    budesonide-formoterol (Symbicort) 160-4.5 MCG/ACT inhaler    Hypothyroidism    Current Assessment & Plan     No sxs TSH repeated and tyler. 02/18/2025         Relevant Orders    TSH (Completed)    Mild intermittent asthma without complication    Current Assessment & Plan       No evidence of exacerbation. Continue inhalers.                Relevant Medications    albuterol sulfate HFA (Ventolin HFA) 108 (90 Base) MCG/ACT inhaler    albuterol (PROVENTIL) (2.5 MG/3ML) 0.083% nebulizer solution    montelukast (SINGULAIR) 10 MG tablet    budesonide-formoterol (Symbicort) 160-4.5 MCG/ACT inhaler    Mixed hyperlipidemia    Current Assessment & Plan      Lipid abnormalities are improving with lifestyle modifications    Plan:  Continue same medication/s without change.      Discussed medication dosage, use, side effects, and goals of treatment in detail.    Counseled patient on lifestyle modifications to help control hyperlipidemia.   Her LDL is 125 she is a candidate to consider statin Rx    Patient Treatment Goals:   LDL goal is under 100    Followup in 3 months.         Relevant Orders    Lipid Panel With / Chol / HDL Ratio (Completed)    Chronic maxillary sinusitis    Overview     Followed by ENT             Relevant Medications    doxycycline (MONODOX) 100 MG capsule    Class 1 obesity due to excess calories with serious comorbidity and body mass index (BMI) of 31.0 to 31.9 in adult    History of renal insufficiency    Overview     Creat 1.22 GFR 49 11/25/2024  Creat 1.0   GFR 60 02/18/2025 renal  insuffiency resolved.          Relevant Orders    Comprehensive Metabolic Panel (Completed)       Follow Up Instructions Charge Capture  Follow-up Communications  Return in about 3 months (around 5/18/2025), or if symptoms worsen or fail to improve.  Patient was given instructions and counseling regarding her condition or for health maintenance advice. Please see specific information pulled into the AVS if appropriate.

## 2025-02-19 PROBLEM — Z87.448 HISTORY OF RENAL INSUFFICIENCY: Status: ACTIVE | Noted: 2025-02-18

## 2025-02-19 LAB
ALBUMIN SERPL-MCNC: 4.1 G/DL (ref 3.9–4.9)
ALP SERPL-CCNC: 73 IU/L (ref 44–121)
ALT SERPL-CCNC: 19 IU/L (ref 0–32)
AST SERPL-CCNC: 14 IU/L (ref 0–40)
BASOPHILS # BLD AUTO: 0 X10E3/UL (ref 0–0.2)
BASOPHILS NFR BLD AUTO: 0 %
BILIRUB SERPL-MCNC: 2.2 MG/DL (ref 0–1.2)
BUN SERPL-MCNC: 8 MG/DL (ref 8–27)
BUN/CREAT SERPL: 8 (ref 12–28)
CALCIUM SERPL-MCNC: 9.7 MG/DL (ref 8.7–10.3)
CHLORIDE SERPL-SCNC: 96 MMOL/L (ref 96–106)
CHOLEST SERPL-MCNC: 234 MG/DL (ref 100–199)
CHOLEST/HDLC SERPL: 3.4 RATIO (ref 0–4.4)
CO2 SERPL-SCNC: 26 MMOL/L (ref 20–29)
CREAT SERPL-MCNC: 1 MG/DL (ref 0.57–1)
EGFRCR SERPLBLD CKD-EPI 2021: 62 ML/MIN/1.73
EOSINOPHIL # BLD AUTO: 0.2 X10E3/UL (ref 0–0.4)
EOSINOPHIL NFR BLD AUTO: 2 %
ERYTHROCYTE [DISTWIDTH] IN BLOOD BY AUTOMATED COUNT: 12.7 % (ref 11.7–15.4)
GLOBULIN SER CALC-MCNC: 2.9 G/DL (ref 1.5–4.5)
GLUCOSE SERPL-MCNC: 89 MG/DL (ref 70–99)
HCT VFR BLD AUTO: 44.7 % (ref 34–46.6)
HDLC SERPL-MCNC: 68 MG/DL
HGB BLD-MCNC: 15.3 G/DL (ref 11.1–15.9)
IMM GRANULOCYTES # BLD AUTO: 0.1 X10E3/UL (ref 0–0.1)
IMM GRANULOCYTES NFR BLD AUTO: 1 %
LDLC SERPL CALC-MCNC: 123 MG/DL (ref 0–99)
LYMPHOCYTES # BLD AUTO: 2.2 X10E3/UL (ref 0.7–3.1)
LYMPHOCYTES NFR BLD AUTO: 22 %
MCH RBC QN AUTO: 31.2 PG (ref 26.6–33)
MCHC RBC AUTO-ENTMCNC: 34.2 G/DL (ref 31.5–35.7)
MCV RBC AUTO: 91 FL (ref 79–97)
MONOCYTES # BLD AUTO: 0.8 X10E3/UL (ref 0.1–0.9)
MONOCYTES NFR BLD AUTO: 8 %
NEUTROPHILS # BLD AUTO: 6.6 X10E3/UL (ref 1.4–7)
NEUTROPHILS NFR BLD AUTO: 67 %
PLATELET # BLD AUTO: 283 X10E3/UL (ref 150–450)
POTASSIUM SERPL-SCNC: 4.1 MMOL/L (ref 3.5–5.2)
PROT SERPL-MCNC: 7 G/DL (ref 6–8.5)
RBC # BLD AUTO: 4.9 X10E6/UL (ref 3.77–5.28)
SODIUM SERPL-SCNC: 136 MMOL/L (ref 134–144)
TRIGL SERPL-MCNC: 248 MG/DL (ref 0–149)
TSH SERPL DL<=0.005 MIU/L-ACNC: 0.85 UIU/ML (ref 0.45–4.5)
VLDLC SERPL CALC-MCNC: 43 MG/DL (ref 5–40)
WBC # BLD AUTO: 9.8 X10E3/UL (ref 3.4–10.8)

## 2025-02-19 RX ORDER — LOSARTAN POTASSIUM 50 MG/1
50 TABLET ORAL DAILY
Qty: 90 TABLET | OUTPATIENT
Start: 2025-02-19

## 2025-02-19 NOTE — PROGRESS NOTES
[employer] message was sent   Good morning we have your lab back and per Dr. Covarrubias   You have normal white blood cell count, normal platelet count ( this is the blood clotting factor) and no signs of anemia.  You have normal liver, kidney and thyroid function, your electrolyte function is normal as well. With your kidney function normal now the referral for the nephrologist is not needed at this time. This is good. Your glucose was 89.  Your total cholesterol was 234 this has increased from the last time it was checked as it was 233, your triglycerides was at 248 this has increased from the last time it was checked as it was 68, your HDL (healthy cholesterol) was 68 this has decreased from the last time it was checked as it was 70( we want this to be as high as we can get it and we do this by exercise, exercise), your LDL ( lousy cholesterol) was 123 this has decreased from the last time it was checked as it was 151. Your total cholesterol/cardiac ratio was 3.4 this has increased from the last time as it was 3.3. You are a candidate to take a statin medication to help and lower your cholesterol. If this is something that you would be willing to take we would send atorvastatin 20 mg to the pharmacy. If started we need to check your cholesterol, again in 1 month with a hepatic function. If this is not something that you are wanting to take we need to Continue to work on diet along with exercise and taking your medications as prescribed, and we will check labs again in a year.

## 2025-02-24 NOTE — ASSESSMENT & PLAN NOTE
Treat maximally pending spring and hx of chronic sinusitis, consider allergy referral. If sxs don't improve.

## 2025-02-24 NOTE — ASSESSMENT & PLAN NOTE
Lipid abnormalities are improving with lifestyle modifications    Plan:  Continue same medication/s without change.      Discussed medication dosage, use, side effects, and goals of treatment in detail.    Counseled patient on lifestyle modifications to help control hyperlipidemia.   Her LDL is 125 she is a candidate to consider statin Rx    Patient Treatment Goals:   LDL goal is under 100    Followup in 3 months.

## 2025-02-28 ENCOUNTER — OFFICE (AMBULATORY)
Dept: URBAN - METROPOLITAN AREA CLINIC 64 | Facility: CLINIC | Age: 65
End: 2025-02-28
Payer: MEDICARE

## 2025-02-28 DIAGNOSIS — K21.9 GASTRO-ESOPHAGEAL REFLUX DISEASE WITHOUT ESOPHAGITIS: ICD-10-CM

## 2025-02-28 PROCEDURE — 99426 PRIN CARE MGMT STAFF 1ST 30: CPT | Performed by: NURSE PRACTITIONER

## 2025-03-10 ENCOUNTER — OFFICE VISIT (OUTPATIENT)
Dept: FAMILY MEDICINE CLINIC | Facility: CLINIC | Age: 65
End: 2025-03-10
Payer: MEDICARE

## 2025-03-10 VITALS
WEIGHT: 185 LBS | DIASTOLIC BLOOD PRESSURE: 76 MMHG | OXYGEN SATURATION: 98 % | BODY MASS INDEX: 30.82 KG/M2 | SYSTOLIC BLOOD PRESSURE: 116 MMHG | HEART RATE: 76 BPM | RESPIRATION RATE: 20 BRPM | HEIGHT: 65 IN

## 2025-03-10 DIAGNOSIS — E03.9 HYPOTHYROIDISM, UNSPECIFIED TYPE: ICD-10-CM

## 2025-03-10 DIAGNOSIS — I10 BENIGN HYPERTENSION: Primary | ICD-10-CM

## 2025-03-10 DIAGNOSIS — K22.70 BARRETT'S ESOPHAGUS WITHOUT DYSPLASIA: ICD-10-CM

## 2025-03-10 DIAGNOSIS — J30.9 ALLERGIC RHINITIS, UNSPECIFIED SEASONALITY, UNSPECIFIED TRIGGER: ICD-10-CM

## 2025-03-10 PROCEDURE — 1126F AMNT PAIN NOTED NONE PRSNT: CPT | Performed by: FAMILY MEDICINE

## 2025-03-10 PROCEDURE — G2211 COMPLEX E/M VISIT ADD ON: HCPCS | Performed by: FAMILY MEDICINE

## 2025-03-10 PROCEDURE — 3074F SYST BP LT 130 MM HG: CPT | Performed by: FAMILY MEDICINE

## 2025-03-10 PROCEDURE — 1111F DSCHRG MED/CURRENT MED MERGE: CPT | Performed by: FAMILY MEDICINE

## 2025-03-10 PROCEDURE — 3078F DIAST BP <80 MM HG: CPT | Performed by: FAMILY MEDICINE

## 2025-03-10 PROCEDURE — 99214 OFFICE O/P EST MOD 30 MIN: CPT | Performed by: FAMILY MEDICINE

## 2025-03-10 RX ORDER — LOSARTAN POTASSIUM AND HYDROCHLOROTHIAZIDE 12.5; 5 MG/1; MG/1
1 TABLET ORAL DAILY
Qty: 90 TABLET | Refills: 3 | Status: SHIPPED | OUTPATIENT
Start: 2025-03-10

## 2025-03-10 RX ORDER — MONTELUKAST SODIUM 10 MG/1
10 TABLET ORAL
Qty: 90 TABLET | Refills: 3 | Status: SHIPPED | OUTPATIENT
Start: 2025-03-10

## 2025-03-10 RX ORDER — LOSARTAN POTASSIUM AND HYDROCHLOROTHIAZIDE 12.5; 5 MG/1; MG/1
1 TABLET ORAL DAILY
COMMUNITY
Start: 2025-02-19 | End: 2025-03-10 | Stop reason: SDUPTHER

## 2025-03-10 RX ORDER — FAMOTIDINE 40 MG/1
40 TABLET, FILM COATED ORAL NIGHTLY
Qty: 90 TABLET | Refills: 3 | Status: SHIPPED | OUTPATIENT
Start: 2025-03-10

## 2025-03-10 RX ORDER — LEVOTHYROXINE SODIUM 112 UG/1
112 TABLET ORAL DAILY
Qty: 90 TABLET | Refills: 3 | Status: SHIPPED | OUTPATIENT
Start: 2025-03-10

## 2025-03-10 NOTE — PROGRESS NOTES
Subjective   Irma Dias is a 64 y.o. female.   Chief Complaint   Patient presents with    Asthma     History of Present Illness  Needs refill on losartan-hctz as she did not tolerate losartan alone  Protonix causes renal insufficiency - has nephrology appt pending  Gastroparesis problematic - stopped erythromycin and reglan        Follow up  Pertinent negative symptoms include no abdominal pain, no chest pain, no fatigue, no fever, no nausea, no neck pain, no numbness, no rash, no swollen glands, no dysuria, no vomiting and no weakness.        The following portions of the patient's history were reviewed and updated as appropriate: allergies, current medications, past family history, past medical history, past social history, past surgical history, and problem list.    Patient Active Problem List   Diagnosis    Benign hypertension    Ram's esophagus    Allergic rhinitis    Hypothyroidism    Mild intermittent asthma without complication    Acute reaction to stress    Mixed hyperlipidemia    Gilbert's syndrome    Genital herpes simplex    Acute non-recurrent maxillary sinusitis    Astigmatism    Chronic maxillary sinusitis    Class 1 obesity due to excess calories with serious comorbidity and body mass index (BMI) of 31.0 to 31.9 in adult    Personal history of endometriosis    Menopausal symptoms    History of total hysterectomy with bilateral salpingo-oophorectomy (BSO)    History of renal insufficiency       Current Outpatient Medications on File Prior to Visit   Medication Sig Dispense Refill    albuterol (PROVENTIL) (2.5 MG/3ML) 0.083% nebulizer solution Take 2.5 mg by nebulization Every 4 (Four) Hours As Needed for Wheezing. 30 mL 12    albuterol sulfate HFA (Ventolin HFA) 108 (90 Base) MCG/ACT inhaler Inhale 2 puffs Every 4 (Four) Hours As Needed for Wheezing. 18 g 12    Auvi-Q 0.3 MG/0.3ML solution auto-injector injection       budesonide-formoterol (Symbicort) 160-4.5 MCG/ACT inhaler Inhale 2 puffs 2  (Two) Times a Day. 10.2 g 12    [DISCONTINUED] levothyroxine (SYNTHROID, LEVOTHROID) 112 MCG tablet Take 1 tablet by mouth Daily. 90 tablet 3    [DISCONTINUED] losartan-hydrochlorothiazide (HYZAAR) 50-12.5 MG per tablet Take 1 tablet by mouth Daily.      [DISCONTINUED] montelukast (SINGULAIR) 10 MG tablet Take 1 tablet by mouth Daily With Breakfast. 90 tablet 3    Cholecalciferol 10 MCG (400 UNIT) tablet Take 1 tablet by mouth Daily. (Patient not taking: Reported on 3/10/2025)      [DISCONTINUED] doxycycline (MONODOX) 100 MG capsule Take 1 capsule by mouth 2 (Two) Times a Day. 60 capsule 1    [DISCONTINUED] losartan (COZAAR) 50 MG tablet Take 1 tablet by mouth Daily. (Patient not taking: Reported on 3/10/2025) 30 tablet 12     No current facility-administered medications on file prior to visit.     Current outpatient and discharge medications have been reconciled for the patient.  Reviewed by: Jesse Renteria MD      Allergies   Allergen Reactions    Codeine Nausea Only and Hallucinations    Gabapentin Hallucinations    Metoprolol Shortness Of Breath    Mobic [Meloxicam] Other (See Comments)     nightmares    Penicillin V Potassium Rash    Sulfamethoxazole-Trimethoprim Rash    Amlodipine Besylate Unknown - Low Severity    Bystolic [Nebivolol Hcl] Headache    Cefprozil Itching    Hydrocodone Nausea And Vomiting    Hydrocodone-Acetaminophen Nausea And Vomiting    Lisinopril Cough    Sulfa Antibiotics Hallucinations    Tizanidine Hcl Hallucinations    Valsartan Itching and Nausea And Vomiting    Penicillins Rash       Review of Systems   Constitutional:  Negative for activity change, appetite change, fatigue and fever.   HENT:  Negative for ear pain, swollen glands and voice change.    Eyes:  Negative for visual disturbance.   Respiratory:  Negative for shortness of breath and wheezing.    Cardiovascular:  Negative for chest pain and leg swelling.   Gastrointestinal:  Negative for abdominal pain, blood in stool,  "constipation, diarrhea, nausea and vomiting.   Endocrine: Negative for polydipsia and polyuria.   Genitourinary:  Negative for dysuria, frequency and hematuria.   Musculoskeletal:  Negative for joint swelling, neck pain and neck stiffness.   Skin:  Negative for rash and wound.   Neurological:  Negative for weakness, numbness and headache.   Psychiatric/Behavioral:  Negative for suicidal ideas and depressed mood.      I have reviewed and confirmed the accuracy of the ROS as documented by the MA/LPN/RN Jesse Renteria MD    Objective   Visit Vitals  /76 (BP Location: Right arm, Patient Position: Sitting, Cuff Size: Large Adult)   Pulse 76   Resp 20   Ht 165.1 cm (65\")   Wt 83.9 kg (185 lb)   SpO2 98%   BMI 30.79 kg/m²      **  Physical Exam  Constitutional:       Appearance: She is well-developed.   HENT:      Head: Normocephalic and atraumatic.      Right Ear: External ear normal.      Left Ear: External ear normal.      Nose: Nose normal.   Eyes:      Pupils: Pupils are equal, round, and reactive to light.   Cardiovascular:      Rate and Rhythm: Normal rate and regular rhythm.      Heart sounds: Normal heart sounds.   Pulmonary:      Effort: Pulmonary effort is normal.      Breath sounds: Normal breath sounds.   Abdominal:      General: Bowel sounds are normal.      Palpations: Abdomen is soft.   Musculoskeletal:         General: Normal range of motion.      Cervical back: Normal range of motion and neck supple.   Skin:     General: Skin is warm and dry.   Neurological:      Mental Status: She is alert and oriented to person, place, and time.   Psychiatric:         Behavior: Behavior normal.         Thought Content: Thought content normal.         Judgment: Judgment normal.       Derm Physical Exam  Ortho Exam   Neurological Exam  Mental Status  Alert. Oriented to person, place, and time.    Cranial Nerves  CN III, IV, VI: Pupils equal round and reactive to light bilaterally.         Assessment & " Plan  Benign hypertension  Start meds    Orders:    losartan-hydrochlorothiazide (HYZAAR) 50-12.5 MG per tablet; Take 1 tablet by mouth Daily.    Hypothyroidism, unspecified type  Stable, continue current management.   Orders:    levothyroxine (SYNTHROID, LEVOTHROID) 112 MCG tablet; Take 1 tablet by mouth Daily.    Allergic rhinitis, unspecified seasonality, unspecified trigger  Continue singulair    Orders:    montelukast (SINGULAIR) 10 MG tablet; Take 1 tablet by mouth Daily With Breakfast.    Ram's esophagus without dysplasia  Try pepcid   Orders:    famotidine (PEPCID) 40 MG tablet; Take 1 tablet by mouth Every Night.      Findings discussed. All questions answered.  Differential diagnosis discussed.   Medication and medication adverse effects discussed.  Drug education given and explained to patient. Patient verbalized understanding.  Follow-up in 4-6 weeks if not better.  Follow-up sooner for worsening symptoms or for any concerns.          Expected course, medications, and adverse effects discussed as appropriate.  Call or return if worsening or persistent symptoms.     This document is intended for medical professional use only.   Electronically signed by Jesse Renteria MD on 03/10/2025. This content may not have been proofread.

## 2025-03-10 NOTE — ASSESSMENT & PLAN NOTE
Continue singulair    Orders:    montelukast (SINGULAIR) 10 MG tablet; Take 1 tablet by mouth Daily With Breakfast.

## 2025-03-10 NOTE — ASSESSMENT & PLAN NOTE
Start meds    Orders:    losartan-hydrochlorothiazide (HYZAAR) 50-12.5 MG per tablet; Take 1 tablet by mouth Daily.

## 2025-03-10 NOTE — ASSESSMENT & PLAN NOTE
Stable, continue current management.   Orders:    levothyroxine (SYNTHROID, LEVOTHROID) 112 MCG tablet; Take 1 tablet by mouth Daily.

## 2025-03-31 ENCOUNTER — OFFICE (AMBULATORY)
Dept: URBAN - METROPOLITAN AREA CLINIC 64 | Facility: CLINIC | Age: 65
End: 2025-03-31
Payer: COMMERCIAL

## 2025-03-31 DIAGNOSIS — K21.9 GASTRO-ESOPHAGEAL REFLUX DISEASE WITHOUT ESOPHAGITIS: ICD-10-CM

## 2025-03-31 PROCEDURE — 99426 PRIN CARE MGMT STAFF 1ST 30: CPT | Performed by: NURSE PRACTITIONER

## 2025-04-30 ENCOUNTER — OFFICE (AMBULATORY)
Dept: URBAN - METROPOLITAN AREA CLINIC 64 | Facility: CLINIC | Age: 65
End: 2025-04-30
Payer: MEDICARE

## 2025-04-30 DIAGNOSIS — K21.9 GASTRO-ESOPHAGEAL REFLUX DISEASE WITHOUT ESOPHAGITIS: ICD-10-CM

## 2025-04-30 PROCEDURE — 99426 PRIN CARE MGMT STAFF 1ST 30: CPT | Performed by: NURSE PRACTITIONER

## 2025-05-08 NOTE — PROGRESS NOTES
Subjective   Irma Dias is a 64 y.o. female.   Chief Complaint   Patient presents with    Hypertension     History of Present Illness  Nephrologist d/c losartan/hctz; put patient on losartan 100mg    Patient took 100mg yesterday morning and by mid day her bp was 200s/100s    Some headache with elevated BP   No CP or SOA   Hypertension  Chronicity:  Recurrent  Onset:  More than 1 year ago  Condition status:  Controlled  Associated symptoms: peripheral edema    Associated symptoms: no anxiety, no blurred vision, no chest pain, no headaches, no malaise/fatigue, no neck pain, no orthopnea, no palpitations and no shortness of breath    Agents associated with hypertension:  Thyroid hormones  Compliance problems:  No compliance problems       The following portions of the patient's history were reviewed and updated as appropriate: allergies, current medications, past family history, past medical history, past social history, past surgical history, and problem list.    Patient Active Problem List   Diagnosis    Benign hypertension    Ram's esophagus    Allergic rhinitis    Hypothyroidism    Mild intermittent asthma without complication    Acute reaction to stress    Mixed hyperlipidemia    Gilbert's syndrome    Genital herpes simplex    Acute non-recurrent maxillary sinusitis    Astigmatism    Chronic maxillary sinusitis    Class 1 obesity due to excess calories with serious comorbidity and body mass index (BMI) of 31.0 to 31.9 in adult    Personal history of endometriosis    Menopausal symptoms    History of total hysterectomy with bilateral salpingo-oophorectomy (BSO)    History of renal insufficiency    Mild renal insufficiency       Current Outpatient Medications on File Prior to Visit   Medication Sig Dispense Refill    albuterol (PROVENTIL) (2.5 MG/3ML) 0.083% nebulizer solution Take 2.5 mg by nebulization Every 4 (Four) Hours As Needed for Wheezing. 30 mL 12    albuterol sulfate HFA (Ventolin HFA) 108 (90  Base) MCG/ACT inhaler Inhale 2 puffs Every 4 (Four) Hours As Needed for Wheezing. 18 g 12    Auvi-Q 0.3 MG/0.3ML solution auto-injector injection       budesonide-formoterol (Symbicort) 160-4.5 MCG/ACT inhaler Inhale 2 puffs 2 (Two) Times a Day. 10.2 g 12    famotidine (PEPCID) 40 MG tablet Take 1 tablet by mouth Every Night. 90 tablet 3    levothyroxine (SYNTHROID, LEVOTHROID) 112 MCG tablet Take 1 tablet by mouth Daily. 90 tablet 3    losartan (COZAAR) 100 MG tablet Take 1 tablet by mouth Daily.      montelukast (SINGULAIR) 10 MG tablet Take 1 tablet by mouth Daily With Breakfast. 90 tablet 3    [DISCONTINUED] losartan-hydrochlorothiazide (HYZAAR) 50-12.5 MG per tablet Take 1 tablet by mouth Daily. 90 tablet 3    Cholecalciferol 10 MCG (400 UNIT) tablet Take 1 tablet by mouth Daily. (Patient not taking: Reported on 5/9/2025)       No current facility-administered medications on file prior to visit.     Current outpatient and discharge medications have been reconciled for the patient.  Reviewed by: Jesse Renteria MD      Allergies   Allergen Reactions    Codeine Nausea Only and Hallucinations    Gabapentin Hallucinations    Metoprolol Shortness Of Breath    Mobic [Meloxicam] Other (See Comments)     nightmares    Penicillin V Potassium Rash    Sulfamethoxazole-Trimethoprim Rash    Amlodipine Besylate Unknown - Low Severity    Bystolic [Nebivolol Hcl] Headache    Cefprozil Itching    Hydrocodone Nausea And Vomiting    Hydrocodone-Acetaminophen Nausea And Vomiting    Lisinopril Cough    Sulfa Antibiotics Hallucinations    Tizanidine Hcl Hallucinations    Valsartan Itching and Nausea And Vomiting    Penicillins Rash       Review of Systems   Constitutional:  Negative for activity change, appetite change, fatigue, fever and malaise/fatigue.   HENT:  Negative for ear pain, swollen glands and voice change.    Eyes:  Negative for blurred vision and visual disturbance.   Respiratory:  Negative for shortness of  "breath and wheezing.    Cardiovascular:  Negative for chest pain, palpitations, orthopnea and leg swelling.   Gastrointestinal:  Negative for abdominal pain, blood in stool, constipation, diarrhea, nausea and vomiting.   Endocrine: Negative for polydipsia and polyuria.   Genitourinary:  Negative for dysuria, frequency and hematuria.   Musculoskeletal:  Negative for joint swelling, neck pain and neck stiffness.   Skin:  Negative for rash and wound.   Neurological:  Negative for weakness, numbness and headache.   Psychiatric/Behavioral:  Negative for suicidal ideas and depressed mood.      I have reviewed and confirmed the accuracy of the ROS as documented by the MA/LPN/RN Jesse Renteria MD    Objective   Visit Vitals  /88 (BP Location: Right arm, Patient Position: Sitting, Cuff Size: Adult)   Pulse 62   Resp 20   Ht 165.1 cm (65\")   Wt 83.5 kg (184 lb)   SpO2 100%   BMI 30.62 kg/m²      **  Physical Exam  Constitutional:       Appearance: She is well-developed.   HENT:      Head: Normocephalic and atraumatic.      Right Ear: External ear normal.      Left Ear: External ear normal.      Nose: Nose normal.   Eyes:      Pupils: Pupils are equal, round, and reactive to light.   Cardiovascular:      Rate and Rhythm: Normal rate and regular rhythm.      Heart sounds: Normal heart sounds.   Pulmonary:      Effort: Pulmonary effort is normal.      Breath sounds: Normal breath sounds.   Abdominal:      General: Bowel sounds are normal.      Palpations: Abdomen is soft.   Musculoskeletal:         General: Normal range of motion.      Cervical back: Normal range of motion and neck supple.   Skin:     General: Skin is warm and dry.   Neurological:      Mental Status: She is alert and oriented to person, place, and time.   Psychiatric:         Behavior: Behavior normal.         Thought Content: Thought content normal.         Judgment: Judgment normal.       Derm Physical Exam  Ortho Exam   Neurological Exam  Mental " Status  Alert. Oriented to person, place, and time.    Cranial Nerves  CN III, IV, VI: Pupils equal round and reactive to light bilaterally.         Assessment & Plan  Benign hypertension  Continue losartan 100 mg qd. BP reasonable today. Hydralazine prn >160 systolic     Orders:    hydrALAZINE (APRESOLINE) 25 MG tablet; Take 1 tablet by mouth 3 (Three) Times a Day As Needed (SBP over 160).    Mild renal insufficiency  Keep follow up with Dr High       Follow-up for routine health maintenance as indicated.   Findings discussed. All questions answered.  Medication and medication adverse effects discussed.  Drug education given and explained to patient. Patient verbalized understanding.  Expected course, medications, and adverse effects discussed as appropriate.  Call or return if worsening or persistent symptoms.     This document is intended for medical professional use only.   Electronically signed by Jesse Renteria MD on 05/09/2025. This content may not have been proofread.

## 2025-05-09 ENCOUNTER — OFFICE VISIT (OUTPATIENT)
Dept: FAMILY MEDICINE CLINIC | Facility: CLINIC | Age: 65
End: 2025-05-09
Payer: MEDICARE

## 2025-05-09 VITALS
BODY MASS INDEX: 30.66 KG/M2 | WEIGHT: 184 LBS | HEART RATE: 62 BPM | RESPIRATION RATE: 20 BRPM | SYSTOLIC BLOOD PRESSURE: 136 MMHG | HEIGHT: 65 IN | OXYGEN SATURATION: 100 % | DIASTOLIC BLOOD PRESSURE: 88 MMHG

## 2025-05-09 DIAGNOSIS — I10 BENIGN HYPERTENSION: Primary | ICD-10-CM

## 2025-05-09 DIAGNOSIS — N28.9 MILD RENAL INSUFFICIENCY: ICD-10-CM

## 2025-05-09 RX ORDER — LOSARTAN POTASSIUM 100 MG/1
100 TABLET ORAL DAILY
COMMUNITY
Start: 2025-05-07

## 2025-05-09 RX ORDER — HYDRALAZINE HYDROCHLORIDE 25 MG/1
25 TABLET, FILM COATED ORAL 3 TIMES DAILY PRN
Qty: 60 TABLET | Refills: 2 | Status: SHIPPED | OUTPATIENT
Start: 2025-05-09

## 2025-05-09 NOTE — ASSESSMENT & PLAN NOTE
Continue losartan 100 mg qd. BP reasonable today. Hydralazine prn >160 systolic     Orders:    hydrALAZINE (APRESOLINE) 25 MG tablet; Take 1 tablet by mouth 3 (Three) Times a Day As Needed (SBP over 160).

## 2025-05-16 ENCOUNTER — RESULTS FOLLOW-UP (OUTPATIENT)
Dept: FAMILY MEDICINE CLINIC | Facility: CLINIC | Age: 65
End: 2025-05-16
Payer: MEDICARE

## 2025-05-23 ENCOUNTER — CLINICAL SUPPORT (OUTPATIENT)
Dept: FAMILY MEDICINE CLINIC | Facility: CLINIC | Age: 65
End: 2025-05-23

## 2025-05-23 VITALS
RESPIRATION RATE: 20 BRPM | OXYGEN SATURATION: 99 % | BODY MASS INDEX: 30.66 KG/M2 | WEIGHT: 184 LBS | HEIGHT: 65 IN | HEART RATE: 63 BPM | DIASTOLIC BLOOD PRESSURE: 88 MMHG | SYSTOLIC BLOOD PRESSURE: 138 MMHG

## 2025-05-23 DIAGNOSIS — I10 BENIGN HYPERTENSION: ICD-10-CM

## 2025-05-23 DIAGNOSIS — Z02.4 ENCOUNTER FOR CDL (COMMERCIAL DRIVING LICENSE) EXAM: Primary | ICD-10-CM

## 2025-05-23 DIAGNOSIS — J30.9 ALLERGIC RHINITIS, UNSPECIFIED SEASONALITY, UNSPECIFIED TRIGGER: ICD-10-CM

## 2025-05-23 LAB
BILIRUB BLD-MCNC: NEGATIVE MG/DL
CLARITY, POC: CLEAR
COLOR UR: YELLOW
GLUCOSE UR STRIP-MCNC: NEGATIVE MG/DL
KETONES UR QL: NEGATIVE
LEUKOCYTE EST, POC: NEGATIVE
NITRITE UR-MCNC: NEGATIVE MG/ML
PH UR: 6 [PH] (ref 5–8)
PROT UR STRIP-MCNC: NEGATIVE MG/DL
RBC # UR STRIP: NEGATIVE /UL
SP GR UR: 1.01 (ref 1–1.03)
UROBILINOGEN UR QL: NORMAL

## 2025-05-23 PROCEDURE — DOTPHY: Performed by: FAMILY MEDICINE

## 2025-05-23 PROCEDURE — 81002 URINALYSIS NONAUTO W/O SCOPE: CPT | Performed by: FAMILY MEDICINE

## 2025-05-23 RX ORDER — LOSARTAN POTASSIUM AND HYDROCHLOROTHIAZIDE 12.5; 5 MG/1; MG/1
1 TABLET ORAL DAILY
Qty: 90 TABLET | Refills: 3 | Status: SHIPPED | OUTPATIENT
Start: 2025-05-23

## 2025-05-23 RX ORDER — MONTELUKAST SODIUM 10 MG/1
10 TABLET ORAL
Qty: 90 TABLET | Refills: 3 | Status: SHIPPED | OUTPATIENT
Start: 2025-05-23

## 2025-05-23 NOTE — PROGRESS NOTES
Medical Examination    Subjective   Irma Dias is a 64 y.o. female who presents today for a  fitness determination physical exam. The patient reports no problems.  The following portions of the patient's history were reviewed and updated as appropriate: allergies, current medications, past family history, past medical history, past social history, past surgical history, and problem list.  Review of Systems  Pertinent items are noted in HPI.    Objective    Vision:  Vision Screening    Right eye Left eye Both eyes   Without correction      With correction 20/20 20/20 20/20       Applicant can recognize and distinguish among traffic control signals and devices showing standard red, green, and jodee colors.  Applicant has peripheral vision to 90 degrees in each eye.    Applicant meets visual acuity requirement only when wearing corrective lenses.    Monocular Vision?: No      Hearing:  Applicant can distinguish forced whisper at a distance of 5 feet with both ears.         Physical Exam  Constitutional:       Appearance: She is well-developed.   HENT:      Head: Normocephalic and atraumatic.      Right Ear: External ear normal.      Left Ear: External ear normal.      Nose: Nose normal.   Eyes:      Pupils: Pupils are equal, round, and reactive to light.   Cardiovascular:      Rate and Rhythm: Normal rate and regular rhythm.      Heart sounds: Normal heart sounds.   Pulmonary:      Effort: Pulmonary effort is normal.      Breath sounds: Normal breath sounds.   Abdominal:      General: Bowel sounds are normal.      Palpations: Abdomen is soft.   Musculoskeletal:         General: Normal range of motion.      Cervical back: Normal range of motion and neck supple.   Skin:     General: Skin is warm and dry.   Neurological:      Mental Status: She is alert and oriented to person, place, and time.   Psychiatric:         Behavior: Behavior normal.         Thought Content: Thought content  normal.         Judgment: Judgment normal.        Labs:  Lab Results   Component Value Date    SPECGRAV 1.010 05/23/2025    BILIRUBINUR Negative 05/23/2025    GLUCOSEU Negative 01/11/2019       Assessment & Plan   Healthy female exam.   Meets standards in 47 .41;  qualifies for 2 year certificate.     Medical examiners certificate completed and printed.  Return as needed.

## 2025-08-18 ENCOUNTER — OFFICE VISIT (OUTPATIENT)
Dept: FAMILY MEDICINE CLINIC | Facility: CLINIC | Age: 65
End: 2025-08-18
Payer: MEDICARE

## 2025-08-18 VITALS
BODY MASS INDEX: 30.72 KG/M2 | DIASTOLIC BLOOD PRESSURE: 74 MMHG | HEART RATE: 77 BPM | HEIGHT: 65 IN | SYSTOLIC BLOOD PRESSURE: 120 MMHG | RESPIRATION RATE: 20 BRPM | TEMPERATURE: 97.4 F | OXYGEN SATURATION: 98 % | WEIGHT: 184.4 LBS

## 2025-08-18 DIAGNOSIS — L03.115 CELLULITIS OF RIGHT LOWER LEG: Primary | ICD-10-CM

## 2025-08-18 PROCEDURE — 1111F DSCHRG MED/CURRENT MED MERGE: CPT | Performed by: FAMILY MEDICINE

## 2025-08-18 PROCEDURE — 99213 OFFICE O/P EST LOW 20 MIN: CPT | Performed by: FAMILY MEDICINE

## 2025-08-18 PROCEDURE — 3078F DIAST BP <80 MM HG: CPT | Performed by: FAMILY MEDICINE

## 2025-08-18 PROCEDURE — G2211 COMPLEX E/M VISIT ADD ON: HCPCS | Performed by: FAMILY MEDICINE

## 2025-08-18 PROCEDURE — 3074F SYST BP LT 130 MM HG: CPT | Performed by: FAMILY MEDICINE

## 2025-08-18 PROCEDURE — 1126F AMNT PAIN NOTED NONE PRSNT: CPT | Performed by: FAMILY MEDICINE

## 2025-08-18 RX ORDER — DOXYCYCLINE 100 MG/1
100 CAPSULE ORAL 2 TIMES DAILY
Qty: 28 CAPSULE | Refills: 0 | Status: SHIPPED | OUTPATIENT
Start: 2025-08-18 | End: 2025-09-01

## (undated) DEVICE — CATH DIAG IMPULSE PIG 5F 100CM

## (undated) DEVICE — HI-TORQUE BALANCE MIDDLEWEIGHT GUIDE WIRE .014 STRAIGHT TIP 3.0 CM X 190 CM: Brand: HI-TORQUE BALANCE MIDDLEWEIGHT

## (undated) DEVICE — CATH DIAG IMPULSE FR5 5F 100CM

## (undated) DEVICE — GW EMR FIX EXCHG J STD .035 3MM 260CM

## (undated) DEVICE — PK CATH CARD 40

## (undated) DEVICE — KT MANIFLD CARDIAC

## (undated) DEVICE — CATH DIAG EXPO .045 FL3  5F 100CM

## (undated) DEVICE — CATH DIAG IMPULSE FL3.5 5F 100CM

## (undated) DEVICE — GLIDESHEATH BASIC HYDROPHILIC COATED INTRODUCER SHEATH: Brand: GLIDESHEATH

## (undated) DEVICE — BALN PRESS WEDGE 5F 110CM

## (undated) DEVICE — GLIDESHEATH SLENDER STAINLESS STEEL KIT: Brand: GLIDESHEATH SLENDER